# Patient Record
Sex: MALE | Race: WHITE | Employment: FULL TIME | URBAN - METROPOLITAN AREA
[De-identification: names, ages, dates, MRNs, and addresses within clinical notes are randomized per-mention and may not be internally consistent; named-entity substitution may affect disease eponyms.]

---

## 2022-05-31 ENCOUNTER — HOSPITAL ENCOUNTER (EMERGENCY)
Age: 47
Discharge: HOME | End: 2022-05-31
Attending: EMERGENCY MEDICINE
Payer: COMMERCIAL

## 2022-05-31 ENCOUNTER — APPOINTMENT (OUTPATIENT)
Dept: CT IMAGING | Age: 47
End: 2022-05-31
Payer: COMMERCIAL

## 2022-05-31 VITALS
HEART RATE: 95 BPM | RESPIRATION RATE: 18 BRPM | TEMPERATURE: 99.1 F | HEIGHT: 69 IN | DIASTOLIC BLOOD PRESSURE: 85 MMHG | WEIGHT: 185 LBS | BODY MASS INDEX: 27.4 KG/M2 | OXYGEN SATURATION: 100 % | SYSTOLIC BLOOD PRESSURE: 122 MMHG

## 2022-05-31 DIAGNOSIS — K57.32 DIVERTICULITIS OF COLON: Primary | ICD-10-CM

## 2022-05-31 LAB
ALBUMIN SERPL-MCNC: 4.1 G/DL (ref 3.5–5)
ALBUMIN/GLOB SERPL: 1.3 {RATIO}
ALP SERPL-CCNC: 72 U/L (ref 45–117)
ALT SERPL-CCNC: 20 U/L (ref 13–61)
ANION GAP SERPL CALC-SCNC: 13 MMOL/L (ref 7–16)
APPEARANCE UR: CLEAR
AST SERPL-CCNC: 21 U/L (ref 15–37)
BASOPHILS # BLD: 0 K/UL (ref 0–0.2)
BASOPHILS NFR BLD: 0 % (ref 0–2)
BILIRUB SERPL-MCNC: 0.4 MG/DL (ref 0.2–1.1)
BILIRUB UR QL: NEGATIVE
BUN SERPL-MCNC: 10 MG/DL (ref 7–18)
CALCIUM SERPL-MCNC: 9.6 MG/DL (ref 8.3–10.4)
CHLORIDE SERPL-SCNC: 99 MMOL/L (ref 98–107)
CO2 SERPL-SCNC: 27 MMOL/L (ref 21–32)
COLOR UR: YELLOW
CREAT SERPL-MCNC: 0.91 MG/DL (ref 0.8–1.5)
DIFFERENTIAL METHOD BLD: ABNORMAL
EOSINOPHIL # BLD: 0.2 K/UL (ref 0–0.8)
EOSINOPHIL NFR BLD: 3 % (ref 0.5–7.8)
ERYTHROCYTE [DISTWIDTH] IN BLOOD BY AUTOMATED COUNT: 12.2 % (ref 11.9–14.6)
GLOBULIN SER CALC-MCNC: 3.1 G/DL (ref 2.3–3.5)
GLUCOSE SERPL-MCNC: 133 MG/DL (ref 65–100)
GLUCOSE UR STRIP.AUTO-MCNC: NEGATIVE MG/DL
HCT VFR BLD AUTO: 46.2 % (ref 41.1–50.3)
HGB BLD-MCNC: 16.4 G/DL (ref 13.6–17.2)
HGB UR QL STRIP: NEGATIVE
IMM GRANULOCYTES # BLD AUTO: 0 K/UL (ref 0–0.5)
IMM GRANULOCYTES NFR BLD AUTO: 0 % (ref 0–5)
KETONES UR QL STRIP.AUTO: NEGATIVE MG/DL
LEUKOCYTE ESTERASE UR QL STRIP.AUTO: NEGATIVE
LIPASE SERPL-CCNC: 230 U/L (ref 13–60)
LYMPHOCYTES # BLD: 1.5 K/UL (ref 0.5–4.6)
LYMPHOCYTES NFR BLD: 23 % (ref 13–44)
MCH RBC QN AUTO: 32.4 PG (ref 26.1–32.9)
MCHC RBC AUTO-ENTMCNC: 35.5 G/DL (ref 31.4–35)
MCV RBC AUTO: 91.3 FL (ref 79.6–97.8)
MONOCYTES # BLD: 0.6 K/UL (ref 0.1–1.3)
MONOCYTES NFR BLD: 9 % (ref 4–12)
NEUTS SEG # BLD: 4.3 K/UL (ref 1.7–8.2)
NEUTS SEG NFR BLD: 65 % (ref 43–78)
NITRITE UR QL STRIP.AUTO: NEGATIVE
NRBC # BLD: 0 K/UL (ref 0–0.2)
PH UR STRIP: 6 [PH] (ref 5–9)
PLATELET # BLD AUTO: 307 K/UL (ref 150–450)
PMV BLD AUTO: 9.1 FL (ref 9.4–12.3)
POTASSIUM SERPL-SCNC: 4.4 MMOL/L (ref 3.5–5.1)
PROT SERPL-MCNC: 7.2 G/DL (ref 6.4–8.2)
PROT UR STRIP-MCNC: NEGATIVE MG/DL
RBC # BLD AUTO: 5.06 M/UL (ref 4.23–5.6)
SODIUM SERPL-SCNC: 139 MMOL/L (ref 136–145)
SP GR UR REFRACTOMETRY: 1.02 (ref 1–1.02)
UROBILINOGEN UR QL STRIP.AUTO: 0.2 EU/DL (ref 0.2–1)
WBC # BLD AUTO: 6.7 K/UL (ref 4.3–11.1)

## 2022-05-31 PROCEDURE — 6360000004 HC RX CONTRAST MEDICATION: Performed by: PHYSICIAN ASSISTANT

## 2022-05-31 PROCEDURE — 99285 EMERGENCY DEPT VISIT HI MDM: CPT

## 2022-05-31 PROCEDURE — 74177 CT ABD & PELVIS W/CONTRAST: CPT

## 2022-05-31 PROCEDURE — 85025 COMPLETE CBC W/AUTO DIFF WBC: CPT

## 2022-05-31 PROCEDURE — 81003 URINALYSIS AUTO W/O SCOPE: CPT

## 2022-05-31 PROCEDURE — 80053 COMPREHEN METABOLIC PANEL: CPT

## 2022-05-31 PROCEDURE — 83690 ASSAY OF LIPASE: CPT

## 2022-05-31 PROCEDURE — 2580000003 HC RX 258: Performed by: PHYSICIAN ASSISTANT

## 2022-05-31 RX ORDER — SODIUM CHLORIDE 0.9 % (FLUSH) 0.9 %
5-40 SYRINGE (ML) INJECTION 2 TIMES DAILY
Status: DISCONTINUED | OUTPATIENT
Start: 2022-05-31 | End: 2022-05-31 | Stop reason: HOSPADM

## 2022-05-31 RX ORDER — BUPROPION HYDROCHLORIDE 300 MG/1
300 TABLET ORAL EVERY MORNING
COMMUNITY

## 2022-05-31 RX ORDER — 0.9 % SODIUM CHLORIDE 0.9 %
100 INTRAVENOUS SOLUTION INTRAVENOUS ONCE
Status: DISCONTINUED | OUTPATIENT
Start: 2022-05-31 | End: 2022-05-31 | Stop reason: HOSPADM

## 2022-05-31 RX ORDER — OLMESARTAN MEDOXOMIL 5 MG/1
5 TABLET ORAL DAILY
Status: ON HOLD | COMMUNITY
End: 2022-09-19

## 2022-05-31 RX ORDER — SODIUM CHLORIDE 0.9 % (FLUSH) 0.9 %
3 SYRINGE (ML) INJECTION EVERY 8 HOURS
Status: DISCONTINUED | OUTPATIENT
Start: 2022-05-31 | End: 2022-05-31 | Stop reason: HOSPADM

## 2022-05-31 RX ORDER — METRONIDAZOLE 500 MG/1
500 TABLET ORAL 3 TIMES DAILY
Qty: 21 TABLET | Refills: 0 | Status: SHIPPED | OUTPATIENT
Start: 2022-05-31 | End: 2022-06-07

## 2022-05-31 RX ORDER — CIPROFLOXACIN 500 MG/1
500 TABLET, FILM COATED ORAL 2 TIMES DAILY
Qty: 14 TABLET | Refills: 0 | Status: SHIPPED | OUTPATIENT
Start: 2022-05-31 | End: 2022-06-07

## 2022-05-31 RX ORDER — ONDANSETRON 4 MG/1
4 TABLET, FILM COATED ORAL EVERY 8 HOURS PRN
Qty: 10 TABLET | Refills: 0 | Status: SHIPPED | OUTPATIENT
Start: 2022-05-31 | End: 2022-06-03

## 2022-05-31 RX ORDER — 0.9 % SODIUM CHLORIDE 0.9 %
1000 INTRAVENOUS SOLUTION INTRAVENOUS
Status: COMPLETED | OUTPATIENT
Start: 2022-05-31 | End: 2022-05-31

## 2022-05-31 RX ADMIN — SODIUM CHLORIDE 1000 ML: 9 INJECTION, SOLUTION INTRAVENOUS at 13:04

## 2022-05-31 RX ADMIN — IOPAMIDOL 100 ML: 755 INJECTION, SOLUTION INTRAVENOUS at 13:51

## 2022-05-31 ASSESSMENT — ENCOUNTER SYMPTOMS
RHINORRHEA: 0
ABDOMINAL DISTENTION: 0
CONSTIPATION: 0
BACK PAIN: 1
HEMATEMESIS: 0
VOMITING: 1
SHORTNESS OF BREATH: 0
ABDOMINAL PAIN: 1
DIARRHEA: 1
SORE THROAT: 0
NAUSEA: 1
BLOOD IN STOOL: 0

## 2022-05-31 ASSESSMENT — PAIN SCALES - GENERAL: PAINLEVEL_OUTOF10: 7

## 2022-05-31 ASSESSMENT — PAIN - FUNCTIONAL ASSESSMENT: PAIN_FUNCTIONAL_ASSESSMENT: 0-10

## 2022-05-31 ASSESSMENT — PAIN DESCRIPTION - ORIENTATION: ORIENTATION: LEFT

## 2022-05-31 ASSESSMENT — PAIN DESCRIPTION - LOCATION: LOCATION: ABDOMEN

## 2022-05-31 ASSESSMENT — PAIN DESCRIPTION - PAIN TYPE: TYPE: ACUTE PAIN

## 2022-05-31 ASSESSMENT — PAIN DESCRIPTION - DESCRIPTORS: DESCRIPTORS: BURNING

## 2022-05-31 NOTE — ED TRIAGE NOTES
Pt ambulatory from lobby to triage. Pt presents to the ED with c/o left sided abd pain, nausea, vomiting and diarrhea. Pt states he has had the diarrhea for about two weeks, but the abd pain started about three days ago. Denies any fever.

## 2022-05-31 NOTE — ED NOTES
I have reviewed discharge instructions with the patient. The patient verbalized understanding. Patient left ED via Discharge Method: ambulatory to Home with self. Opportunity for questions and clarification provided. Patient given 3 scripts. To continue your aftercare when you leave the hospital, you may receive an automated call from our care team to check in on how you are doing. This is a free service and part of our promise to provide the best care and service to meet your aftercare needs.  If you have questions, or wish to unsubscribe from this service please call [phone]. Thank you for Choosing our [facility] Emergency Department.           Tommye Homans, RN  05/31/22 5860

## 2022-05-31 NOTE — ED PROVIDER NOTES
[facility] Department Provider Note                     PCP:                None Provider               Age: 48 y.o. Sex: male           ICD-10-CM    1. Diverticulitis of colon  K57.32        DISCHARGED     MDM  Number of Diagnoses or Management Options  Diverticulitis of colon  Diagnosis management comments: Mr. Dwain Givens is a 20-year-old male who presented to the facility today with left-sided abdominal pain for the past 3 days. On exam patient is well-appearing in no acute distress. Afebrile with normal vital signs. Labs grossly unremarkable. CT scan of the abdomen obtained due to the left lower quadrant tenderness to palpation on exam.  Findings demonstrate uncomplicated sigmoid diverticulitis. Plan will be to discharge the patient with antibiotics and Zofran for nausea as needed. Continue eating and drinking as tolerated, but focus on fluids. Monitor for new or worsening symptoms such as fevers, worsening pain, uncontrolled nausea vomiting, bloody stools, or other such symptoms and return if they occur. Patient is understanding of the findings here today as well as the plan of care that was reviewed with him and is agreeable to proceed. Patient ambulatory upon discharge in stable condition. Voice dictation software was used during the making of this note. This software is not perfect and grammatical and other typographical errors may be present. This note has been proofread, but may still contain errors.   Cullen Ruiz PA-C; 5/31/2022 @2:43 PM   ===================================================================         Amount and/or Complexity of Data Reviewed  Clinical lab tests: reviewed and ordered  Tests in the radiology section of CPT®: ordered and reviewed  Tests in the medicine section of CPT®: reviewed and ordered  Review and summarize past medical records: yes  Independent visualization of images, tracings, or specimens: yes    Risk of Complications, Morbidity, and/or Mortality  Presenting problems: moderate  Diagnostic procedures: moderate  Management options: moderate  General comments: CT ABDOMEN PELVIS W IV CONTRAST Additional Contrast? None   Final Result    Uncomplicated diverticulitis of the sigmoid colon. The patient was observed in the ED.     Results Reviewed:      Recent Results (from the past 24 hour(s))  -CBC with Diff:   Collection Time: 05/31/22 12:39 PM       Result                      Value             Ref Range           WBC                         6.7               4.3 - 11.1 K*       RBC                         5.06              4.23 - 5.60 *       Hemoglobin                  16.4              13.6 - 17.2 *       Hematocrit                  46.2              41.1 - 50.3 %       MCV                         91.3              79.6 - 97.8 *       MCH                         32.4              26.1 - 32.9 *       MCHC                        35.5 (H)          31.4 - 35.0 *       RDW                         12.2              11.9 - 14.6 %       Platelets                   307               150 - 450 K/*       MPV                         9.1 (L)           9.4 - 12.3 FL       nRBC                        0.00              0.0 - 0.2 K/*       Differential Type           AUTOMATED                             Seg Neutrophils             65                43 - 78 %           Lymphocytes                 23                13 - 44 %           Monocytes                   9                 4.0 - 12.0 %        Eosinophils %               3                 0.5 - 7.8 %         Basophils                   0                 0.0 - 2.0 %         Immature Granulocytes       0                 0.0 - 5.0 %         Segs Absolute               4.3               1.7 - 8.2 K/*       Absolute Lymph #            1.5               0.5 - 4.6 K/*       Absolute Mono #             0.6               0.1 - 1.3 K/*       Absolute Eos #              0.2               0.0 - 0.8 K/*       Basophils Absolute          0.0               0.0 - 0.2 K/*       Absolute Immature Gran*     0.0               0.0 - 0.5 K/*  -CMP:   Collection Time: 05/31/22 12:39 PM       Result                      Value             Ref Range           Sodium                      139               136 - 145 mm*       Potassium                   4.4               3.5 - 5.1 mm*       Chloride                    99                98 - 107 mmo*       CO2                         27                21 - 32 mmol*       Anion Gap                   13                7.0 - 16.0 m*       Glucose                     133 (H)           65 - 100 mg/*       BUN                         10                7.0 - 18.0 M*       CREATININE                  0.91              0.8 - 1.5 MG*       GFR         >114              >60 ml/min/1*       GFR Non- Americ*     >60               >60 ml/min/1*       Calcium                     9.6               8.3 - 10.4 M*       Total Bilirubin             0.4               0.2 - 1.1 MG*       ALT                         20                13.0 - 61.0 *       AST                         21                15 - 37 U/L         Alk Phosphatase             72                45.0 - 117.0*       Total Protein               7.2               6.4 - 8.2 g/*       Albumin                     4.1               3.5 - 5.0 g/*       Globulin                    3.1               2.3 - 3.5 g/*       Albumin/Globulin Ratio      1.3                              -Lipase:   Collection Time: 05/31/22 12:39 PM       Result                      Value             Ref Range           Lipase                      230 (H)           13 - 60 U/L    -Urinalysis w rflx microscopic:   Collection Time: 05/31/22 12:40 PM       Result                      Value             Ref Range           Color, UA                   YELLOW                                Appearance                  CLEAR                                 Specific [place], UA        1.020             1.001 - 1.02*       pH, Urine                   6.0               5.0 - 9.0           Protein, UA                 Negative          NEG mg/dL           Glucose, UA                 Negative          mg/dL               Ketones, Urine              Negative          NEG mg/dL           Bilirubin Urine             Negative          NEG                 Blood, Urine                Negative          NEG                 Urobilinogen, Urine         0.2               0.2 - 1.0 EU*       Nitrite, Urine              Negative          NEG                 Leukocyte Esterase, Ur*     Negative          NEG              I discussed the results of all labs, procedures, radiographs, and treatments with the patient and available family. Treatment plan is agreed upon and the patient is ready for discharge. All voiced understanding of the discharge plan and medication instructions or changes as appropriate. Questions about treatment in the ED were answered. All were encouraged to return should symptoms worsen or new problems develop. h    Patient Progress  Patient progress: stable      Orders Placed This Encounter   Procedures    CT ABDOMEN PELVIS W IV CONTRAST Additional Contrast? None    CBC with Diff    CMP    Lipase    Urinalysis w rflx microscopic    Diet NPO    Saline lock IV        Gayle Spicer is a 48 y.o. male who presents to the Emergency Department with chief complaint of    Chief Complaint   Patient presents with    Abdominal Pain      Patient is a 64-year-old male who presents to facility today with complaint of left lower quadrant abdominal pain, nausea, vomiting, and diarrhea. Patient reports he has been dealing with diarrhea and nausea for the past 2 weeks and states the pain is new the last 3 days. He reports the pain radiates from his left lower quadrant up the left side of his abdomen and does wrap around to the left side of his middle to low back.   He reports he does have a medical history of hypertension and prediabetes for which he takes a single antihypertensive medication as well as metformin. No recent changes in dosing. He states he has had a colonoscopy about 20 years ago but has not had one recently. He denies any fevers, chills, chest pain, shortness of breath, flank pain, dysuria, urgency, frequency, scrotal swelling, penile discharge, or any other symptoms. The history is provided by the patient. Abdominal Pain  Pain location:  LLQ  Pain quality: pressure and sharp    Pain radiates to:  Back  Pain severity:  Moderate  Onset quality:  Gradual  Duration:  3 days  Timing:  Constant  Progression:  Worsening  Chronicity:  New  Relieved by:  Nothing  Worsened by:  Nothing  Ineffective treatments:  None tried  Associated symptoms: diarrhea, nausea and vomiting    Associated symptoms: no anorexia, no chest pain, no chills, no constipation, no dysuria, no fever, no hematemesis, no hematuria, no melena, no shortness of breath and no sore throat    Diarrhea:     Quality:  Watery    Severity:  Moderate    Duration:  2 weeks    Timing:  Constant    Progression:  Unchanged  Nausea:     Severity:  Mild    Onset quality:  Gradual    Duration:  3 days    Timing:  Intermittent    Progression:  Unchanged      Review of Systems   Constitutional: Negative for chills and fever. HENT: Negative for congestion, rhinorrhea and sore throat. Respiratory: Negative for shortness of breath. Cardiovascular: Negative for chest pain. Gastrointestinal: Positive for abdominal pain, diarrhea, nausea and vomiting. Negative for abdominal distention, anorexia, blood in stool, constipation, hematemesis and melena. Genitourinary: Negative for dysuria, flank pain, hematuria and urgency. Musculoskeletal: Positive for back pain. Negative for gait problem. Skin: Negative for rash. Neurological: Negative for dizziness, syncope and headaches.    Psychiatric/Behavioral: Negative for is no hernia in the umbilical area or ventral area. Skin:     General: Skin is warm and dry. Capillary Refill: Capillary refill takes less than 2 seconds. Findings: No rash. Neurological:      General: No focal deficit present. Mental Status: He is alert and oriented to person, place, and time. Psychiatric:         Mood and Affect: Mood normal.         Behavior: Behavior normal.              Procedures    [unfilled]     CT ABDOMEN PELVIS W IV CONTRAST Additional Contrast? None   Final Result   Uncomplicated diverticulitis of the sigmoid colon. Voice dictation software was used during the making of this note. This software is not perfect and grammatical and other typographical errors may be present. This note has not been completely proofread for errors.        Taryn Pink PA-C  05/31/22 0553

## 2022-09-19 ENCOUNTER — APPOINTMENT (OUTPATIENT)
Dept: GENERAL RADIOLOGY | Age: 47
DRG: 062 | End: 2022-09-19
Payer: COMMERCIAL

## 2022-09-19 ENCOUNTER — APPOINTMENT (OUTPATIENT)
Dept: CT IMAGING | Age: 47
DRG: 062 | End: 2022-09-19
Payer: COMMERCIAL

## 2022-09-19 ENCOUNTER — HOSPITAL ENCOUNTER (EMERGENCY)
Age: 47
Discharge: ANOTHER HOSPITAL | DRG: 062 | End: 2022-09-19
Attending: EMERGENCY MEDICINE
Payer: COMMERCIAL

## 2022-09-19 ENCOUNTER — HOSPITAL ENCOUNTER (INPATIENT)
Age: 47
LOS: 3 days | Discharge: HOME | DRG: 062 | End: 2022-09-22
Attending: INTERNAL MEDICINE | Admitting: INTERNAL MEDICINE
Payer: COMMERCIAL

## 2022-09-19 VITALS
RESPIRATION RATE: 13 BRPM | DIASTOLIC BLOOD PRESSURE: 78 MMHG | WEIGHT: 182 LBS | TEMPERATURE: 98.1 F | HEIGHT: 70 IN | BODY MASS INDEX: 26.05 KG/M2 | OXYGEN SATURATION: 95 % | SYSTOLIC BLOOD PRESSURE: 101 MMHG | HEART RATE: 75 BPM

## 2022-09-19 DIAGNOSIS — I63.9 CEREBROVASCULAR ACCIDENT (CVA), UNSPECIFIED MECHANISM (HCC): Primary | ICD-10-CM

## 2022-09-19 DIAGNOSIS — R29.90 STROKE-LIKE SYMPTOMS: ICD-10-CM

## 2022-09-19 DIAGNOSIS — R55 SYNCOPE, UNSPECIFIED SYNCOPE TYPE: ICD-10-CM

## 2022-09-19 DIAGNOSIS — F10.930 ALCOHOL WITHDRAWAL SYNDROME WITHOUT COMPLICATION (HCC): ICD-10-CM

## 2022-09-19 LAB
ALBUMIN SERPL-MCNC: 4.3 G/DL (ref 3.5–5)
ALBUMIN/GLOB SERPL: 1.7
ALP SERPL-CCNC: 73 U/L (ref 45–117)
ALT SERPL-CCNC: 28 U/L (ref 13–61)
AMPHET UR QL SCN: POSITIVE
ANION GAP SERPL CALC-SCNC: 12 MMOL/L (ref 7–16)
AST SERPL-CCNC: 27 U/L (ref 15–37)
BENZODIAZ UR QL: NEGATIVE
BILIRUB SERPL-MCNC: 0.5 MG/DL (ref 0.2–1.1)
BUN SERPL-MCNC: 19 MG/DL (ref 7–18)
CALCIUM SERPL-MCNC: 9.8 MG/DL (ref 8.3–10.4)
CANNABINOIDS UR QL SCN: NEGATIVE
CHLORIDE SERPL-SCNC: 101 MMOL/L (ref 98–107)
CHOLEST SERPL-MCNC: 172 MG/DL
CO2 SERPL-SCNC: 28 MMOL/L (ref 21–32)
COCAINE UR QL SCN: NEGATIVE
CREAT SERPL-MCNC: 1.15 MG/DL (ref 0.8–1.5)
ERYTHROCYTE [DISTWIDTH] IN BLOOD BY AUTOMATED COUNT: 12 % (ref 11.9–14.6)
EST. AVERAGE GLUCOSE BLD GHB EST-MCNC: 117 MG/DL
GLOBULIN SER CALC-MCNC: 2.6 G/DL (ref 2.3–3.5)
GLUCOSE BLD STRIP.AUTO-MCNC: 120 MG/DL (ref 65–100)
GLUCOSE BLD STRIP.AUTO-MCNC: 139 MG/DL (ref 65–100)
GLUCOSE BLD-MCNC: 120 MG/DL
GLUCOSE SERPL-MCNC: 144 MG/DL (ref 65–100)
HBA1C MFR BLD: 5.7 % (ref 4.8–5.6)
HCT VFR BLD AUTO: 44.4 % (ref 41.1–50.3)
HDLC SERPL-MCNC: 36 MG/DL (ref 40–60)
HDLC SERPL: 4.8
HGB BLD-MCNC: 16.1 G/DL (ref 13.6–17.2)
INR PPP: 1
LDLC SERPL CALC-MCNC: 89.6 MG/DL
LIPASE SERPL-CCNC: 56 U/L (ref 13–60)
MAGNESIUM SERPL-MCNC: 1.7 MG/DL (ref 1.2–2.6)
MCH RBC QN AUTO: 33.1 PG (ref 26.1–32.9)
MCHC RBC AUTO-ENTMCNC: 36.3 G/DL (ref 31.4–35)
MCV RBC AUTO: 91.2 FL (ref 79.6–97.8)
NRBC # BLD: 0 K/UL (ref 0–0.2)
OPIATES UR QL: NEGATIVE
PLATELET # BLD AUTO: 275 K/UL (ref 150–450)
PMV BLD AUTO: 9.1 FL (ref 9.4–12.3)
POTASSIUM SERPL-SCNC: 4 MMOL/L (ref 3.5–5.1)
PROT SERPL-MCNC: 6.9 G/DL (ref 6.4–8.2)
PROTHROMBIN TIME: 13.4 SEC (ref 12.6–14.5)
RBC # BLD AUTO: 4.87 M/UL (ref 4.23–5.6)
SERVICE CMNT-IMP: ABNORMAL
SERVICE CMNT-IMP: ABNORMAL
SODIUM SERPL-SCNC: 141 MMOL/L (ref 138–145)
TRIGL SERPL-MCNC: 232 MG/DL (ref 35–150)
TROPONIN I SERPL HS-MCNC: 4.5 PG/ML (ref 0–14)
TROPONIN T SERPL HS-MCNC: 7.2 NG/L (ref 0–22)
TROPONIN T SERPL HS-MCNC: 8.2 NG/L (ref 0–22)
TSH W FREE THYROID IF ABNORMAL: 1.64 UIU/ML (ref 0.36–3.74)
VLDLC SERPL CALC-MCNC: 46.4 MG/DL (ref 6–23)
WBC # BLD AUTO: 6.9 K/UL (ref 4.3–11.1)

## 2022-09-19 PROCEDURE — 80053 COMPREHEN METABOLIC PANEL: CPT

## 2022-09-19 PROCEDURE — 93005 ELECTROCARDIOGRAM TRACING: CPT

## 2022-09-19 PROCEDURE — 84443 ASSAY THYROID STIM HORMONE: CPT

## 2022-09-19 PROCEDURE — 70498 CT ANGIOGRAPHY NECK: CPT

## 2022-09-19 PROCEDURE — 71045 X-RAY EXAM CHEST 1 VIEW: CPT

## 2022-09-19 PROCEDURE — 6370000000 HC RX 637 (ALT 250 FOR IP): Performed by: INTERNAL MEDICINE

## 2022-09-19 PROCEDURE — 3E03317 INTRODUCTION OF OTHER THROMBOLYTIC INTO PERIPHERAL VEIN, PERCUTANEOUS APPROACH: ICD-10-PCS | Performed by: HOSPITALIST

## 2022-09-19 PROCEDURE — 6360000002 HC RX W HCPCS: Performed by: EMERGENCY MEDICINE

## 2022-09-19 PROCEDURE — 85610 PROTHROMBIN TIME: CPT

## 2022-09-19 PROCEDURE — 83735 ASSAY OF MAGNESIUM: CPT

## 2022-09-19 PROCEDURE — 83036 HEMOGLOBIN GLYCOSYLATED A1C: CPT

## 2022-09-19 PROCEDURE — 85027 COMPLETE CBC AUTOMATED: CPT

## 2022-09-19 PROCEDURE — 80061 LIPID PANEL: CPT

## 2022-09-19 PROCEDURE — 2580000003 HC RX 258: Performed by: EMERGENCY MEDICINE

## 2022-09-19 PROCEDURE — 4A03X5D MEASUREMENT OF ARTERIAL FLOW, INTRACRANIAL, EXTERNAL APPROACH: ICD-10-PCS | Performed by: INTERNAL MEDICINE

## 2022-09-19 PROCEDURE — 84484 ASSAY OF TROPONIN QUANT: CPT

## 2022-09-19 PROCEDURE — 80307 DRUG TEST PRSMV CHEM ANLYZR: CPT

## 2022-09-19 PROCEDURE — 6360000004 HC RX CONTRAST MEDICATION: Performed by: EMERGENCY MEDICINE

## 2022-09-19 PROCEDURE — 83690 ASSAY OF LIPASE: CPT

## 2022-09-19 PROCEDURE — 99222 1ST HOSP IP/OBS MODERATE 55: CPT | Performed by: NURSE PRACTITIONER

## 2022-09-19 PROCEDURE — 70450 CT HEAD/BRAIN W/O DYE: CPT

## 2022-09-19 PROCEDURE — 99285 EMERGENCY DEPT VISIT HI MDM: CPT

## 2022-09-19 PROCEDURE — 82962 GLUCOSE BLOOD TEST: CPT

## 2022-09-19 PROCEDURE — 2100000000 HC CCU R&B

## 2022-09-19 PROCEDURE — 96374 THER/PROPH/DIAG INJ IV PUSH: CPT

## 2022-09-19 RX ORDER — 0.9 % SODIUM CHLORIDE 0.9 %
1000 INTRAVENOUS SOLUTION INTRAVENOUS ONCE
Status: COMPLETED | OUTPATIENT
Start: 2022-09-19 | End: 2022-09-19

## 2022-09-19 RX ORDER — ACETAMINOPHEN 650 MG/1
650 SUPPOSITORY RECTAL EVERY 4 HOURS PRN
Status: DISCONTINUED | OUTPATIENT
Start: 2022-09-19 | End: 2022-09-22 | Stop reason: HOSPADM

## 2022-09-19 RX ORDER — LABETALOL HYDROCHLORIDE 5 MG/ML
10 INJECTION, SOLUTION INTRAVENOUS EVERY 10 MIN PRN
Status: DISCONTINUED | OUTPATIENT
Start: 2022-09-19 | End: 2022-09-22 | Stop reason: HOSPADM

## 2022-09-19 RX ORDER — ACETAMINOPHEN 325 MG/1
650 TABLET ORAL EVERY 4 HOURS PRN
Status: DISCONTINUED | OUTPATIENT
Start: 2022-09-19 | End: 2022-09-22 | Stop reason: HOSPADM

## 2022-09-19 RX ORDER — 0.9 % SODIUM CHLORIDE 0.9 %
100 INTRAVENOUS SOLUTION INTRAVENOUS ONCE
Status: DISCONTINUED | OUTPATIENT
Start: 2022-09-19 | End: 2022-09-19 | Stop reason: HOSPADM

## 2022-09-19 RX ORDER — TRAZODONE HYDROCHLORIDE 50 MG/1
100 TABLET ORAL NIGHTLY
COMMUNITY
Start: 2022-09-18

## 2022-09-19 RX ORDER — TRAZODONE HYDROCHLORIDE 50 MG/1
100 TABLET ORAL NIGHTLY
Status: DISCONTINUED | OUTPATIENT
Start: 2022-09-19 | End: 2022-09-22 | Stop reason: HOSPADM

## 2022-09-19 RX ORDER — ONDANSETRON 2 MG/ML
4 INJECTION INTRAMUSCULAR; INTRAVENOUS EVERY 6 HOURS PRN
Status: DISCONTINUED | OUTPATIENT
Start: 2022-09-19 | End: 2022-09-22 | Stop reason: HOSPADM

## 2022-09-19 RX ORDER — LOPERAMIDE HYDROCHLORIDE 2 MG/1
4 CAPSULE ORAL
Status: COMPLETED | OUTPATIENT
Start: 2022-09-19 | End: 2022-09-19

## 2022-09-19 RX ORDER — ONDANSETRON 4 MG/1
4 TABLET, ORALLY DISINTEGRATING ORAL EVERY 8 HOURS PRN
Status: DISCONTINUED | OUTPATIENT
Start: 2022-09-19 | End: 2022-09-22 | Stop reason: HOSPADM

## 2022-09-19 RX ORDER — BUPROPION HYDROCHLORIDE 150 MG/1
150 TABLET, EXTENDED RELEASE ORAL 2 TIMES DAILY
Status: DISCONTINUED | OUTPATIENT
Start: 2022-09-19 | End: 2022-09-22 | Stop reason: HOSPADM

## 2022-09-19 RX ORDER — SODIUM CHLORIDE 0.9 % (FLUSH) 0.9 %
5-40 SYRINGE (ML) INJECTION 2 TIMES DAILY
Status: DISCONTINUED | OUTPATIENT
Start: 2022-09-19 | End: 2022-09-19 | Stop reason: HOSPADM

## 2022-09-19 RX ORDER — SODIUM CHLORIDE 0.9 % (FLUSH) 0.9 %
10 SYRINGE (ML) INJECTION ONCE
Status: COMPLETED | OUTPATIENT
Start: 2022-09-19 | End: 2022-09-19

## 2022-09-19 RX ORDER — BISACODYL 10 MG
10 SUPPOSITORY, RECTAL RECTAL DAILY PRN
Status: DISCONTINUED | OUTPATIENT
Start: 2022-09-19 | End: 2022-09-22 | Stop reason: HOSPADM

## 2022-09-19 RX ORDER — TRAMADOL HYDROCHLORIDE 50 MG/1
50 TABLET ORAL EVERY 6 HOURS PRN
Status: DISCONTINUED | OUTPATIENT
Start: 2022-09-19 | End: 2022-09-22 | Stop reason: HOSPADM

## 2022-09-19 RX ORDER — LOPERAMIDE HYDROCHLORIDE 2 MG/1
2 CAPSULE ORAL 4 TIMES DAILY PRN
Status: DISCONTINUED | OUTPATIENT
Start: 2022-09-19 | End: 2022-09-22 | Stop reason: HOSPADM

## 2022-09-19 RX ORDER — ASPIRIN 300 MG/1
300 SUPPOSITORY RECTAL DAILY
Status: DISCONTINUED | OUTPATIENT
Start: 2022-09-20 | End: 2022-09-22 | Stop reason: HOSPADM

## 2022-09-19 RX ORDER — POLYETHYLENE GLYCOL 3350 17 G/17G
17 POWDER, FOR SOLUTION ORAL DAILY PRN
Status: DISCONTINUED | OUTPATIENT
Start: 2022-09-19 | End: 2022-09-22 | Stop reason: HOSPADM

## 2022-09-19 RX ORDER — ASPIRIN 81 MG/1
81 TABLET ORAL DAILY
Status: DISCONTINUED | OUTPATIENT
Start: 2022-09-20 | End: 2022-09-22 | Stop reason: HOSPADM

## 2022-09-19 RX ORDER — NICOTINE 21 MG/24HR
1 PATCH, TRANSDERMAL 24 HOURS TRANSDERMAL DAILY PRN
Status: DISCONTINUED | OUTPATIENT
Start: 2022-09-19 | End: 2022-09-22 | Stop reason: HOSPADM

## 2022-09-19 RX ORDER — OLMESARTAN MEDOXOMIL AND HYDROCHLOROTHIAZIDE 20/12.5 20; 12.5 MG/1; MG/1
1 TABLET ORAL DAILY
COMMUNITY
Start: 2021-06-30 | End: 2022-10-24 | Stop reason: ALTCHOICE

## 2022-09-19 RX ORDER — BUPROPION HYDROCHLORIDE 300 MG/1
300 TABLET ORAL EVERY MORNING
Status: DISCONTINUED | OUTPATIENT
Start: 2022-09-20 | End: 2022-09-19 | Stop reason: CLARIF

## 2022-09-19 RX ORDER — ATORVASTATIN CALCIUM 80 MG/1
80 TABLET, FILM COATED ORAL NIGHTLY
Status: DISCONTINUED | OUTPATIENT
Start: 2022-09-19 | End: 2022-09-22 | Stop reason: HOSPADM

## 2022-09-19 RX ADMIN — LOPERAMIDE HYDROCHLORIDE 4 MG: 2 CAPSULE ORAL at 17:48

## 2022-09-19 RX ADMIN — SODIUM CHLORIDE, PRESERVATIVE FREE 10 ML: 5 INJECTION INTRAVENOUS at 10:59

## 2022-09-19 RX ADMIN — Medication 21 MG: at 10:58

## 2022-09-19 RX ADMIN — ATORVASTATIN CALCIUM 80 MG: 80 TABLET, FILM COATED ORAL at 21:50

## 2022-09-19 RX ADMIN — ACETAMINOPHEN 650 MG: 325 TABLET, FILM COATED ORAL at 15:28

## 2022-09-19 RX ADMIN — IOPAMIDOL 60 ML: 755 INJECTION, SOLUTION INTRAVENOUS at 11:11

## 2022-09-19 RX ADMIN — SODIUM CHLORIDE 1000 ML: 9 INJECTION, SOLUTION INTRAVENOUS at 10:29

## 2022-09-19 RX ADMIN — SODIUM CHLORIDE, PRESERVATIVE FREE 10 ML: 5 INJECTION INTRAVENOUS at 10:57

## 2022-09-19 RX ADMIN — ACETAMINOPHEN 650 MG: 325 TABLET, FILM COATED ORAL at 21:42

## 2022-09-19 ASSESSMENT — ENCOUNTER SYMPTOMS
ABDOMINAL PAIN: 0
VOMITING: 0
SHORTNESS OF BREATH: 0
BACK PAIN: 0
RHINORRHEA: 0
NAUSEA: 0
DIARRHEA: 0
PHOTOPHOBIA: 0
HEMATOCHEZIA: 0
COUGH: 0

## 2022-09-19 ASSESSMENT — PAIN DESCRIPTION - LOCATION
LOCATION: CHEST
LOCATION: HEAD
LOCATION: HEAD
LOCATION: CHEST

## 2022-09-19 ASSESSMENT — PAIN SCALES - GENERAL
PAINLEVEL_OUTOF10: 0
PAINLEVEL_OUTOF10: 0
PAINLEVEL_OUTOF10: 6
PAINLEVEL_OUTOF10: 0
PAINLEVEL_OUTOF10: 3
PAINLEVEL_OUTOF10: 6
PAINLEVEL_OUTOF10: 0
PAINLEVEL_OUTOF10: 9

## 2022-09-19 ASSESSMENT — PAIN DESCRIPTION - DESCRIPTORS
DESCRIPTORS: ACHING
DESCRIPTORS: ACHING;SHARP
DESCRIPTORS: TIGHTNESS;HEAVINESS;PRESSURE
DESCRIPTORS: HEAVINESS;PRESSURE

## 2022-09-19 ASSESSMENT — PAIN DESCRIPTION - PAIN TYPE
TYPE: ACUTE PAIN
TYPE: ACUTE PAIN

## 2022-09-19 ASSESSMENT — PAIN DESCRIPTION - ORIENTATION
ORIENTATION: ANTERIOR
ORIENTATION: POSTERIOR;ANTERIOR
ORIENTATION: LEFT

## 2022-09-19 ASSESSMENT — PAIN DESCRIPTION - FREQUENCY
FREQUENCY: CONTINUOUS
FREQUENCY: CONTINUOUS

## 2022-09-19 ASSESSMENT — PAIN - FUNCTIONAL ASSESSMENT: PAIN_FUNCTIONAL_ASSESSMENT: 0-10

## 2022-09-19 ASSESSMENT — PAIN DESCRIPTION - ONSET: ONSET: GRADUAL

## 2022-09-19 NOTE — ED NOTES
Patient is speaking clearly but states that even though the words sound correct, he is seeing them as jumbled or reversed (as in dyslexia). He is \"reading aloud\" the words correctly but has to focus to make sure he speaks what he knows what are the correct words vs what he is seeing. I had him read the phrases backwards (read the words in reverse order) and he states that he is still seeing the words as jumbled but is speaking them correctly .       Leesa Zhu RN  09/19/22 [id number]

## 2022-09-19 NOTE — ED NOTES
For the dysarthria test patient is able read back the phrases correctly but reports the the words seemed jumbled in his head. New onset.      Junaid Moise RN  09/19/22 1122

## 2022-09-19 NOTE — ED TRIAGE NOTES
Leodan arrives ambulatory to the ED. Reports a syncopal episode on Saturday. Also has been having  since then that radiates to left jaw, neck, arm, with numbness and tingling. C/o SOB.  Denies N/V.

## 2022-09-19 NOTE — FLOWSHEET NOTE
Bedside and verbal shift change report received from  Vicki Roy [place] (offgoing nurse). Report included the following information SBAR, Kardex, Intake/Output, MAR, Recent Results, Med Rec Status, Cardiac Rhythm NSR, Alarm Parameters , and Dual Neuro Assessment.      Dual skin assessment completed at bedside: n/a (list pertinent skin assessment findings)    Dual verification of gtts completed (name of gtts verified): n/a         09/19/22 1900   NIHSS Stroke Scale   Interval Hand-off/Transfer   Level of Consciousness (1a) 0   LOC Questions (1b) 0   LOC Commands (1c) 0   Best Gaze (2) 0   Visual (3) 0   Facial Palsy (4) (!) 1   Motor Arm, Left (5a) 1   Motor Arm, Right (5b) 0   Motor Leg, Left (6a) 1   Motor Leg, Right (6b) 0   Limb Ataxia (7) 0   Sensory (8) (!) 1   Best Language (9) 0   Dysarthria (10) 0   Extinction and Inattention (11) 0   Total 4   NIHSS Stroke Scale Assessed Yes

## 2022-09-19 NOTE — ED PROVIDER NOTES
Emergency Department Provider Note                   PCP:                None Provider               Age: 48 y.o. Sex: male       ICD-10-CM    1. Cerebrovascular accident (CVA), unspecified mechanism ([facility] [address]  I63.9       2. Stroke-like symptoms  R29.90       3. Syncope, unspecified syncope type  R55           DISPOSITION Decision To Transfer 09/19/2022 11:48:00 AM        MDM  Number of Diagnoses or Management Options  Cerebrovascular accident (CVA), unspecified mechanism ([facility] [address]): new, needed workup  Stroke-like symptoms: new, needed workup  Syncope, unspecified syncope type: new, needed workup  Diagnosis management comments: Code S called. There was initial delay in calling Code S as his chief complaint was centered on chest pain. Patient later discussed left-sided weakness. NIHSS 2.  CT head unremarkable. [facility] Neuro states patient is candidate for tenecteplase. Neurologist thoroughly discussed with patient risks and benefits of treatment. I also discussed with patient risks/benefits associated with treatment. Patient in agreement to receiving. Patient was given tenecteplase. Nursing closely monitoring. Blood pressure stable. CTA pending at this time. Will discuss with hospitalist.  Have contacted radiologist and IR as CTA head neck still pending at this time. Have contacted hospitalist service via [employer] to arrange for transport and admission to [facility] facility  Later upon reviewing chart, CTA was read by Dr. Adrian Berry. 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 The posterior cerebral arteries are patent. Discussed with Dr. Adrian Berry who recommends that we discussed with neuro interventionalists at [facility] to see if this is something that they would actually intervene on. Images have been pushed over to [facility] so that they can be viewed. Discussed case with Dr. Madonna Weaver at [facility].   She as well as neuro interventionalists looked over CTA and noted no occlusion or thrombus. States that there is no intervention that they would perform at this time. States the patient can remain at [facility] and does not need to be transferred to [facility]. Updated Dr. Ollie Gilmore on plan. Updated patient on plan. Patient states that there has been some improvement of symptoms. Amount and/or Complexity of Data Reviewed  Clinical lab tests: ordered and reviewed  Tests in the radiology section of CPT®: ordered and reviewed  Tests in the medicine section of CPT®: ordered and reviewed  Review and summarize past medical records: yes  Discuss the patient with other providers: yes  Independent visualization of images, tracings, or specimens: yes    Risk of Complications, Morbidity, and/or Mortality  Presenting problems: high  Diagnostic procedures: high  Management options: high    Critical Care  Total time providing critical care: 30-74 minutes    Patient Progress  Patient progress: improved       ED Course as of 09/19/22 1356   Mon Sep 19, 2022   1031 Portable Chest X-ray FINDINGS:      Support Lines and Tubes: None     Cardiac Silhouette: Within normal limits in size. Lungs: Low lung volumes with bibasilar atelectasis. Pleura: No pleural effusion. No pneumothorax. Osseous Structures: Unremarkable. Upper Abdomen: Unremarkable. IMPRESSION:  Low lung volumes with bibasilar atelectasis. [DF]   1031 CT head FINDINGS:     There is no evidence of acute intracranial hemorrhage or extra-axial  collections. There is no CT evidence of acute infarction. The ventricles are within normal limits. There is no midline shift. The basilar  cisterns are patent. There is no cerebellar tonsillar ectopia or herniation. The imaged portions of the paranasal sinuses and mastoid air cells are well  aerated and clear. IMPRESSION:  No evidence of acute intracranial abnormality.     [DF]   1211 Amphetamine, Urine(!): Positive [DF]   9995 Was informed by nurse that patient with headache after receiving tenecteplase. Will update neurology to see if they would like patient to have a repeat CT head. Normal neuro exam.  Patient actually reports improvement of left-sided symptoms. [DF]   6601 CTA head and neck CT ANGIOGRAM OF THE NECK:     The arch and proximal great vessels are patent. The right and left carotid bulbs  are patent. Mild atherosclerotic changes are noted on the right however no  significant stenosis. Proximal vertebral arteries are patent. The lung apices are clear. Thyroid gland is unremarkable. CT angiogram of [place]:     The petrous, cavernous, and supraclinoid internal carotid arteries are patent. The anterior and middle cerebral arteries are patent. The distal vertebral arteries are patent however the origin of the basilar  artery is occluded however the vessel reconstitutes flow. The posterior cerebral  arteries are patent. The dural venous sinuses are patent. IMPRESSION:  1. 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 The posterior cerebral arteries are patent. [DF]   1243 Was never informed by interventional radiologist on read of CTA head neck. Was never contacted via telephone.  Attempted to  call Dr. Patti Mcclain at 12:43 pm.  [DF]      ED Course User Index  [DF] Mark Larkin MD        Orders Placed This Encounter   Procedures    Critical Care    XR CHEST PORTABLE    CT HEAD WO CONTRAST    CTA HEAD NECK W CONTRAST    CBC    Comprehensive Metabolic Panel    Troponin T    Magnesium    Lipase    Drug Screen Psych, Urine    Protime-INR    Diet NPO    Cardiac Monitor    Pulse Oximetry    Activate Code Stroke    Continuous Pulse Oximetry    Neuro checks    NIH STROKE SCALE ASSESSMENT    Nursing swallow assessment    Weigh patient    Notify physician prior to thrombolytic agent    NIH Stroke Scale (NIHSS) during and post thrombolytic agent    Implement thrombolytic agent hemorrhage/bleeding precautions    Avoid antiplatelet and antithrombotic medications for 24 hours post administration of thrombolytic agent    No Anticoagulants for 24 hours after thrombolytic agent    Implement suspected intracerebral hemorrhage (ICH) guidelines    Notify physician during and post thrombolytic agent    Notify physician during and post thrombolytic agent    POCT Glucose    POCT Glucose    EKG 12 Lead    Saline lock IV    Initiate minimum 2 IV lines for thrombolytic agent infusion    Fall precautions        Medications   0.9 % sodium chloride bolus (has no administration in time range)   sodium chloride flush 0.9 % injection 5-40 mL (has no administration in time range)   0.9 % sodium chloride bolus (0 mLs IntraVENous Stopped 9/19/22 1144)   sodium chloride flush 0.9 % injection 10 mL (10 mLs IntraVENous Given 9/19/22 1057)     Followed by   tenecteplase (TNKASE) injection 21 mg (21 mg IntraVENous Given 9/19/22 1058)     Followed by   sodium chloride flush 0.9 % injection 10 mL (10 mLs IntraVENous Given 9/19/22 1059)   iopamidol (ISOVUE-370) 76 % injection 60 mL (60 mLs IntraVENous Given 9/19/22 1111)       New Prescriptions    No medications on file        Anup Wilson is a 48 y.o. male who presents to the Emergency Department with chief complaint of  chest pain, left sided weakness, syncope. Chief Complaint   Patient presents with    Chest Pain     since Saturday     Loss of Consciousness     On Saturday      48year-old male with history of depression, anxiety, hypertension, diabetes mellitus type 2 presents with complaint of left-sided chest pain and a single episode while attending [employer] this past Saturday. States that he had 2 beers during the game. States he began to feel left-sided chest pain during half time.   States that at the end of the game he had a witnessed syncopal episode by significant other and had to be carried out the same.  Patient was instructed to go to a local hospital but never did. Patient states he was concerned he was dehydrated and drink plenty of fluids on yesterday. Patient states that he woke up this morning feeling completely normal and that around 7:45 AM when he was walking into work he noted left-sided weakness/numbness symptoms. Patient states that he informed triage nurse of left-sided weakness. The history is provided by the patient and the spouse. No  was used. Extremity Weakness  Severity:  Moderate  Onset quality:  Sudden  Duration:  2 hours  Timing:  Constant  Progression:  Unchanged  Chronicity:  New  Relieved by:  Nothing  Worsened by:  Nothing  Ineffective treatments:  None tried  Associated symptoms: chest pain, extremity numbness and stroke symptoms    Associated symptoms: no abdominal pain, no anorexia, no aphasia, no arthralgias, no cough, no diarrhea, no dizziness, no drooling, no dysphagia, no dysuria, no falls, no fever, no frequency, no hematochezia, no lethargy, no loss of consciousness, no melena, no myalgias, no nausea, no seizures, no sensory-motor deficit, no shortness of breath, no urgency and no vomiting        Review of Systems   Constitutional:  Positive for fatigue. Negative for chills, diaphoresis and fever. HENT:  Negative for congestion, drooling and rhinorrhea. Eyes:  Negative for photophobia and visual disturbance. Respiratory:  Negative for cough and shortness of breath. Cardiovascular:  Positive for chest pain. Negative for palpitations and leg swelling. Gastrointestinal:  Negative for abdominal pain, anorexia, diarrhea, dysphagia, hematochezia, melena, nausea and vomiting. Genitourinary:  Negative for dysuria, flank pain, frequency and urgency. Musculoskeletal:  Negative for arthralgias, back pain, falls and myalgias. Skin:  Negative for rash and wound. Neurological:  Positive for syncope, weakness and numbness.  Negative for dizziness, seizures, loss of consciousness, facial asymmetry and speech difficulty. Hematological:  Does not bruise/bleed easily. Psychiatric/Behavioral:  Negative for confusion. Past Medical History:   Diagnosis Date    Borderline diabetes     Depression     Hypertension         History reviewed. No pertinent surgical history. Reports history of colonoscopy around 20 years ago. History reviewed. No pertinent family history. Patient reports father with history of stroke. Social History     Socioeconomic History    Marital status:      Spouse name: None    Number of children: None    Years of education: None    Highest education level: None   Tobacco Use    Smoking status: Never    Smokeless tobacco: Never   Substance and Sexual Activity    Alcohol use: Yes     Alcohol/week: 12.0 standard drinks     Types: 12 Cans of beer per week    Drug use: Never         Penicillins     Previous Medications    BUPROPION (WELLBUTRIN XL) 300 MG EXTENDED RELEASE TABLET    Take 300 mg by mouth every morning    METFORMIN (GLUCOPHAGE) 500 MG TABLET    Take 500 mg by mouth daily (with breakfast)    OLMESARTAN (BENICAR) 5 MG TABLET    Take 5 mg by mouth daily    TRAZODONE (DESYREL) 25 MG TABS    Take 25 mg by mouth nightly        Vitals signs and nursing note reviewed. Patient Vitals for the past 4 hrs:   Temp Pulse Resp BP SpO2   09/19/22 1145 -- 73 12 123/86 --   09/19/22 1130 -- 75 13 118/75 --   09/19/22 1115 -- 75 10 109/79 91 %   09/19/22 1100 -- 79 15 129/76 --   09/19/22 1058 -- -- -- 115/75 --   09/19/22 1045 -- 80 15 124/79 --   09/19/22 1030 -- 74 14 117/77 --   09/19/22 0935 -- -- -- 111/71 --   09/19/22 0934 98.1 °F (36.7 °C) 77 22 -- 97 %          Physical Exam  Vitals and nursing note reviewed. Constitutional:       Appearance: Normal appearance. HENT:      Head: Normocephalic. Comments: Atraumatic.      Right Ear: Tympanic membrane and ear canal normal.      Left Ear: Tympanic membrane and ear canal normal.      Nose: Nose normal.      Mouth/Throat:      Mouth: Mucous membranes are moist.      Pharynx: No oropharyngeal exudate or posterior oropharyngeal erythema. Eyes:      Extraocular Movements: Extraocular movements intact. Pupils: Pupils are equal, round, and reactive to light. Comments: No nystagmus. Cardiovascular:      Rate and Rhythm: Normal rate. Pulses: Normal pulses. Heart sounds: Normal heart sounds. Comments: Pulses 2+ and equal throughout. Pulmonary:      Effort: Pulmonary effort is normal.      Breath sounds: Normal breath sounds. Abdominal:      General: Bowel sounds are normal. There is no distension. Palpations: Abdomen is soft. Tenderness: There is no abdominal tenderness. There is no guarding or rebound. Comments: Soft, nontender, nondistended. No rebound or guarding. Musculoskeletal:         General: No deformity. Normal range of motion. Cervical back: Normal range of motion. No rigidity. Skin:     General: Skin is warm. Findings: No rash. Neurological:      General: No focal deficit present. Mental Status: He is alert and oriented to person, place, and time. Cranial Nerves: No cranial nerve deficit. Sensory: Sensory deficit present. Motor: Weakness present. Comments: Drift noted to left upper extremity. LUE strength 4 out of 5. Subjective decreased sensation to left arm and left leg. No facial droop. No dysarthria. No aphasia.    Psychiatric:         Mood and Affect: Mood normal.         Behavior: Behavior normal.        EKG 12 Lead    Date/Time: 9/19/2022 10:02 AM  Performed by: Hanane Garcia MD  Authorized by: Hanane Garcia MD     ECG reviewed by ED Physician in the absence of a cardiologist: yes    Rate:     ECG rate:  77    ECG rate assessment: normal    Rhythm:     Rhythm: sinus rhythm    Ectopy:     Ectopy: none    QRS:     QRS axis:  Normal    QRS intervals:  Normal    QRS conduction: normal    ST segments:     ST segments:  Normal  T waves:     T waves: normal    Critical Care    Date/Time: 9/19/2022 11:49 AM  Performed by: Mariann Maya MD  Authorized by: Mariann Maya MD     Critical care provider statement:     Critical care time (minutes):  40    Critical care time was exclusive of:  Separately billable procedures and treating other patients    Critical care was necessary to treat or prevent imminent or life-threatening deterioration of the following conditions:  CNS failure or compromise and dehydration    Critical care was time spent personally by me on the following activities:  Blood draw for specimens, development of treatment plan with patient or surrogate, discussions with consultants, discussions with primary provider, examination of patient, evaluation of patient's response to treatment, obtaining history from patient or surrogate, ordering and performing treatments and interventions, ordering and review of laboratory studies, ordering and review of radiographic studies, pulse oximetry and re-evaluation of patient's condition    I assumed direction of critical care for this patient from another provider in my specialty: yes      Care discussed with: admitting provider    Comments:      Patient with strokelike symptoms. Patient candidate for receiving tenecteplase. [facility] neurology recommends administering tenecteplase. Risk/benefits associated with administration discussed thoroughly. Patient alert and oriented x4. Patient states that he would like to receive tenecteplase at this time. Results for orders placed or performed during the hospital encounter of 09/19/22   XR CHEST PORTABLE    Narrative    EXAMINATION: CHEST RADIOGRAPH 9/19/2022 9:40 AM    ACCESSION NUMBER: [id number]    INDICATION: Chest Pain    COMPARISON: None available    TECHNIQUE: A single view of the chest was obtained.      FINDINGS:     Support Lines and Tubes: None    Cardiac Silhouette: Within normal limits in size. Lungs: Low lung volumes with bibasilar atelectasis. Pleura: No pleural effusion. No pneumothorax. Osseous Structures: Unremarkable. Upper Abdomen: Unremarkable. Impression    Low lung volumes with bibasilar atelectasis. CT HEAD WO CONTRAST    Narrative    EXAMINATION: CT HEAD WO CONTRAST 9/19/2022 10:05 AM    ACCESSION NUMBER: [id number]    COMPARISON: None available    INDICATION: Stroke Symptoms left-sided weakness and numbness     TECHNIQUE: Multiple-row detector helical CT examination of the head without  intravenous contrast.     Radiation dose reduction techniques were used for this study. Our CT scanners  use one or all of the following: Automated exposure control, adjustment of the  mA and/or kV according to patient size, iterative reconstruction. FINDINGS:    There is no evidence of acute intracranial hemorrhage or extra-axial  collections. There is no CT evidence of acute infarction. The ventricles are within normal limits. There is no midline shift. The basilar  cisterns are patent. There is no cerebellar tonsillar ectopia or herniation. The imaged portions of the paranasal sinuses and mastoid air cells are well  aerated and clear. Impression    No evidence of acute intracranial abnormality. CTA HEAD NECK W CONTRAST    Narrative    History: Syncopal episode on Saturday chest pain with radiation arm numbness and  tingling    Comments: CT ANGIOGRAM OF THE NECK AND CT ANGIOGRAM OF THE [place] was  obtained following the administration of IV contrast. IV contrast was  administered to evaluate the arterial vasculature. Reformatted images in the  coronal and sagittal planes as well as 3-D imaging was obtained and reviewed on  a dedicated PACS and [address]. Radiation reduction dose techniques  were used for the study.  Our CT scanner use one or all of the following-  Automated exposure control, adjustment of the mA and/or KV according the patient  size, iterative reconstruction. All measurements are based upon NASCET criteria  if appropriate. This study was analyzed by the [address] [place].Algorithm    Contrast: 60 cc of Isovue 370    Findings:    CT ANGIOGRAM OF THE NECK:    The arch and proximal great vessels are patent. The right and left carotid bulbs  are patent. Mild atherosclerotic changes are noted on the right however no  significant stenosis. Proximal vertebral arteries are patent. The lung apices are clear. Thyroid gland is unremarkable. CT angiogram of [place]:    The petrous, cavernous, and supraclinoid internal carotid arteries are patent. The anterior and middle cerebral arteries are patent. The distal vertebral arteries are patent however the origin of the basilar  artery is occluded however the vessel reconstitutes flow. The posterior cerebral  arteries are patent. The dural venous sinuses are patent. Impression    1. 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 The posterior cerebral arteries are patent.        CBC   Result Value Ref Range    WBC 6.9 4.3 - 11.1 K/uL    RBC 4.87 4.23 - 5.60 M/uL    Hemoglobin 16.1 13.6 - 17.2 g/dL    Hematocrit 44.4 41.1 - 50.3 %    MCV 91.2 79.6 - 97.8 FL    MCH 33.1 (H) 26.1 - 32.9 PG    MCHC 36.3 (H) 31.4 - 35.0 g/dL    RDW 12.0 11.9 - 14.6 %    Platelets 193 181 - 209 K/uL    MPV 9.1 (L) 9.4 - 12.3 FL    nRBC 0.00 0.0 - 0.2 K/uL   Comprehensive Metabolic Panel   Result Value Ref Range    Sodium 141 138 - 145 mmol/L    Potassium 4.0 3.5 - 5.1 mmol/L    Chloride 101 98 - 107 mmol/L    CO2 28 21 - 32 mmol/L    Anion Gap 12 7.0 - 16.0 mmol/L    Glucose 144 (H) 65 - 100 mg/dL    BUN 19 (H) 7.0 - 18.0 MG/DL    Creatinine 1.15 0.8 - 1.5 MG/DL    GFR African American >87 >60 ml/min/1.73m2    GFR Non- >60 >60 ml/min/1.73m2 Calcium 9.8 8.3 - 10.4 MG/DL    Total Bilirubin 0.5 0.2 - 1.1 MG/DL    ALT 28 13.0 - 61.0 U/L    AST 27 15 - 37 U/L    Alk Phosphatase 73 45.0 - 117.0 U/L    Total Protein 6.9 6.4 - 8.2 g/dL    Albumin 4.3 3.5 - 5.0 g/dL    Globulin 2.6 2.3 - 3.5 g/dL    Albumin/Globulin Ratio 1.7     Troponin T   Result Value Ref Range    Troponin T 8.2 0 - 22 ng/L   Magnesium   Result Value Ref Range    Magnesium 1.7 1.2 - 2.6 mg/dL   Lipase   Result Value Ref Range    Lipase 56 13 - 60 U/L   Drug Screen Psych, Urine   Result Value Ref Range    Benzodiazepines, Urine Negative NEG      Opiates, Urine Negative NEG      Amphetamine, Urine Positive (A) NEG      Cocaine, Urine Negative NEG      THC, TH-Cannabinol, Urine Negative NEG     Protime-INR   Result Value Ref Range    Protime 13.4 12.6 - 14.5 sec    INR 1.0     POCT Glucose   Result Value Ref Range    Glucose 120 mg/dL   POCT Glucose   Result Value Ref Range    POC Glucose 120 (H) 65 - 100 mg/dL    Performed by: Robert         CTA HEAD NECK W CONTRAST   Final Result   1. 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 The posterior cerebral arteries are patent. CT HEAD WO CONTRAST   Final Result      No evidence of acute intracranial abnormality. XR CHEST PORTABLE   Final Result      Low lung volumes with bibasilar atelectasis. NIH Stroke Scale  Interval: Reassessment  Level of Consciousness (1a): Alert  LOC Questions (1b): Answers both correctly  LOC Commands (1c): Performs both tasks correctly  Best Gaze (2): Normal  Visual (3): No visual loss  Facial Palsy (4): (!) Minor paralysis  Motor Arm, Left (5a): Drift, but does not hit bed  Motor Arm, Right (5b): No drift  Motor Leg, Left (6a): Drift, but does not hit bed  Motor Leg, Right (6b):  No drift  Limb Ataxia (7): Absent  Sensory (8): (!) Mild to Moderate  Best Language (9): No aphasia  Dysarthria (10): Normal  Extinction and Inattention (11): No abnormality  Total: 4              Voice dictation software was used during the making of this note. This software is not perfect and grammatical and other typographical errors may be present. This note has not been completely proofread for errors.      Danielle Decker., MD  09/19/22 1220       Mark Shaikh., MD  09/19/22 1250       Mark Shaikh., MD  09/19/22 3382

## 2022-09-19 NOTE — H&P
Hospitalist History and Physical   Admit Date:  2022  2:24 PM   Name:  Nelda Fontana   Age:  48 y.o. Sex:  male  :  1975   MRN:  [id number]   Room:  [address] [place] [address]    Presenting Complaint: No chief complaint on file. Reason(s) for Admission: Acute cerebrovascular accident (CVA) due to ischemia [facility]) [I63.9]     History of Present Illness:   Nelda Fontana is a 48 y.o. male with medical history of prediabetes, hypertension, depression who presented with chest pain and left-sided weakness. Patient reports having his first episode of left-sided, pressure-like chest pain on Saturday while attending [facility] versus [employer] football game. Reports getting very hot and subsequently passed out for 15 minutes. He experienced another episode of chest pain while at work. Reports shortness of breath with walking short distance this morning as well. Reports left-sided numbness, tingling. Symptoms started around 7:45AM.   Saw his in house PCP at work who sent him to ED. CODE S was called in the ED with NIHSS of 2. Telenuro recommended tPA and he received it at 1058. 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  discussed Dr. Faye Neil at [facility]. She as well as neuro interventionalists looked over CTA and noted no occlusion or thrombus. States that there is no intervention that they would perform at this time. States the patient can remain at [facility] and does not need to be transferred to [facility]. Laboratory work-up is unremarkable. Urine toxicology is positive for amphetamines. Chest x-ray with low lung volumes with bibasilar atelectasis. EKG with normal sinus rhythm without ST or T wave abnormalities. Review of Systems:  10 systems reviewed and negative except as noted in HPI. Assessment & Plan:   Acute Ischemic CVA s/p tPA  Presented with left sided weakness with NIHSS of 2 on admission.    Teleneuro evaluated and recommended tPA @ 1058. CT Head without acute abnormalities. 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  discussed Dr. Kishor Elena at [facility]. She as well as neuro interventionalists looked over CTA and noted no occlusion or thrombus. States that there is no intervention that they would perform at this time. States the patient can remain at [facility] and does not need to be transferred to [facility]. - Monitor for bleeding: headache/dizziness, hematuria, bloody stools, bruising, bleeding gums, epistaxis, bleeding from his knee. - BP checks q15 minutes x 2 hours, then q30 minutes x 6 hours, then q1h x 16 hours  - permissive HTN w/ goal BP < 180/105 [ if SBP > 180-230 or -120, give labetalol 10 mg IV bolus or nicardipine 5 mg/h gtt, titrate up to max of 15 mg/h. If still refractory, consider IV nitroprusside ]  - Glycemic control blood glucose <180  - NO anticoagulants or antiplatelets for 24 hours  - Avoid new insertion of central lines, A-lines, NG tubes,unless medically necessary  - Repeat CT brain 24 hours after first CT brain  - Repeat CT head w/o contrast if any neurological deterioration  - f/u MRI brain  - f/u ECHO w/ bubble study  - f/u TSH, A1c, lipid panel  - ASA 81mg tomorrow if CT brain tomorrow is negative  - Atorvastatin 80 mg PO daily  - NPO, speech and swallow evaluation  - Admission to ICU    Syncope and collapse   Had 1 episodes of syncope at [facility] vs [facility] this sat where he passed out for 15mins  - check EEG  - working up for CVA as above    Chest pain  EKG normal with no troponin. Utox positive for amphetamines. Had pressure like chest pain that grows across his chest with SOB today with minimal exertion. - consider Cardiology consult if continues to have cp.  - check troponin. Pre-diabetes : on metformin at home    Depression: continue wellbutrin when able to take PO    Diet: ADULT DIET;  Regular; 4 carb choices (60 gm/meal)  VTE ppx: SCDs  Code status: Full Code    Critical care time: 35 minutes    Patient is critically ill. Without intervention, there is a high probability of acute organ impairment or life-threatening deterioration in the patient's condition from: CVA    Critical care interventions: tPA, need close monitoring in ICU as pt is at risk for major bleeding event which can be life threatening. More than 50% of the time documented was spent in face-to-face contact with the patient and in the care of the patient on the floor/unit where the patient is located. Due to a high probability of clinically significant, life-threatening deterioration that could result in multiorgan failure,  this critically ill or injured patient required my highest level preparedness to intervene emergent. This critical care time included time spent at bedside performing patient interview and physical exam, time spent researching patient prior to interaction with patient, time spent discussing findings and treatment plan with patient and/or family, time spent discussing patient with consultants and colleagues, time spent reviewing pertinent laboratory and radiographic evaluations, time spent re-evaluating patient, or time spent discussing patient with nursing staff. The time listed is exclusive of any procedures which may have been performed. Hospital Problems:  Principal Problem:    Acute cerebrovascular accident (CVA) due to ischemia [facility])  Active Problems:    Hypertension    Borderline diabetes    Depression  Resolved Problems:    * No resolved hospital problems. *       Past History:     Past Medical History:   Diagnosis Date    Borderline diabetes     Depression     Hypertension        No past surgical history on file. Social History     Tobacco Use    Smoking status: Never    Smokeless tobacco: Never   Substance Use Topics    Alcohol use:  Yes     Alcohol/week: 12.0 standard drinks     Types: 12 Cans of beer per week Social History     Substance and Sexual Activity   Drug Use Never       Family History   Problem Relation Age of Onset    Diabetes Mother     Seizures Father           There is no immunization history on file for this patient. Allergies   Allergen Reactions    Penicillins Hives     Prior to Admit Medications:  Current Outpatient Medications   Medication Instructions    buPROPion (WELLBUTRIN XL) 300 mg, Oral, EVERY MORNING    metFORMIN (GLUCOPHAGE) 500 mg, Oral, DAILY WITH BREAKFAST    olmesartan-hydroCHLOROthiazide (BENICAR HCT) 20-12.5 MG per tablet 1 tablet, Oral, DAILY    traZODone (DESYREL) 100 mg, Oral, Nightly         Objective:   Patient Vitals for the past 24 hrs:    Temp Pulse Resp BP SpO2   09/19/22 1345 -- 75 13 101/78 95 %   09/19/22 1330 -- 76 16 107/86 99 %   09/19/22 1315 -- 74 15 106/78 --   09/19/22 1300 -- 77 14 113/79 97 %   09/19/22 1245 -- 74 14 108/81 100 %   09/19/22 1230 -- 78 14 108/72 94 %   09/19/22 1215 -- 82 11 116/83 --   09/19/22 1200 -- 73 15 105/67 --   09/19/22 1145 -- 73 12 123/86 --   09/19/22 1130 -- 75 13 118/75 --   09/19/22 1115 -- 75 10 109/79 91 %   09/19/22 1100 -- 79 15 129/76 --   09/19/22 1058 -- -- -- 115/75 --   09/19/22 1045 -- 80 15 124/79 --   09/19/22 1030 -- 74 14 117/77 --   09/19/22 0935 -- -- -- 111/71 --   09/19/22 0934 98.1 °F (36.7 °C) 77 22 -- 97 %         Oxygen Therapy  SpO2: 95 %  Pulse via Oximetry: 77 beats per minute  O2 Device: None (Room air)     Estimated body mass index is 26.11 kg/m² as calculated from the following:    Height as of this encounter: 5' 10\" (1.778 m). Weight as of this encounter: 182 lb (82.6 kg). Intake/Output Summary (Last 24 hours) at 9/19/2022 1356  Last data filed at 9/19/2022 1157      Gross per 24 hour   Intake 10 ml   Output 720 ml   Net -710 ml       Physical Exam:    Blood pressure 112/74, pulse 69, resp. rate 13, SpO2 99 %. General:    Well nourished.     Head:  Normocephalic, atraumatic  Eyes:  Sclerae appear normal.  Pupils equally round. ENT:  Nares appear normal, no drainage. Moist oral mucosa  Neck:  No restricted ROM. Trachea midline   CV:   RRR. No m/r/g. No jugular venous distension. Lungs:   CTAB. No wheezing, rhonchi, or rales. Symmetric expansion. Abdomen: Bowel sounds present. Soft, nontender, nondistended. Extremities: No cyanosis or clubbing. No edema  Skin:     No rashes and normal coloration. Warm and dry. Neuro:  A&Ox3. Left sided weakness (LUE/LLE 4/5)  Psych:  Normal mood and affect.       I have personally reviewed labs and tests showing:  Recent Labs:  Recent Results (from the past 24 hour(s))   CBC    Collection Time: 09/19/22  9:44 AM   Result Value Ref Range    WBC 6.9 4.3 - 11.1 K/uL    RBC 4.87 4.23 - 5.60 M/uL    Hemoglobin 16.1 13.6 - 17.2 g/dL    Hematocrit 44.4 41.1 - 50.3 %    MCV 91.2 79.6 - 97.8 FL    MCH 33.1 (H) 26.1 - 32.9 PG    MCHC 36.3 (H) 31.4 - 35.0 g/dL    RDW 12.0 11.9 - 14.6 %    Platelets 963 420 - 709 K/uL    MPV 9.1 (L) 9.4 - 12.3 FL    nRBC 0.00 0.0 - 0.2 K/uL   Comprehensive Metabolic Panel    Collection Time: 09/19/22  9:44 AM   Result Value Ref Range    Sodium 141 138 - 145 mmol/L    Potassium 4.0 3.5 - 5.1 mmol/L    Chloride 101 98 - 107 mmol/L    CO2 28 21 - 32 mmol/L    Anion Gap 12 7.0 - 16.0 mmol/L    Glucose 144 (H) 65 - 100 mg/dL    BUN 19 (H) 7.0 - 18.0 MG/DL    Creatinine 1.15 0.8 - 1.5 MG/DL    GFR African American >87 >60 ml/min/1.73m2    GFR Non- >60 >60 ml/min/1.73m2    Calcium 9.8 8.3 - 10.4 MG/DL    Total Bilirubin 0.5 0.2 - 1.1 MG/DL    ALT 28 13.0 - 61.0 U/L    AST 27 15 - 37 U/L    Alk Phosphatase 73 45.0 - 117.0 U/L    Total Protein 6.9 6.4 - 8.2 g/dL    Albumin 4.3 3.5 - 5.0 g/dL    Globulin 2.6 2.3 - 3.5 g/dL    Albumin/Globulin Ratio 1.7     Troponin T    Collection Time: 09/19/22  9:44 AM   Result Value Ref Range    Troponin T 8.2 0 - 22 ng/L   Magnesium    Collection Time: 09/19/22  9:44 AM   Result Value Ref Range    Magnesium 1.7 1.2 - 2.6 mg/dL   Lipase    Collection Time: 09/19/22  9:44 AM   Result Value Ref Range    Lipase 56 13 - 60 U/L   Protime-INR    Collection Time: 09/19/22  9:44 AM   Result Value Ref Range    Protime 13.4 12.6 - 14.5 sec    INR 1.0     POCT Glucose    Collection Time: 09/19/22 10:12 AM   Result Value Ref Range    Glucose 120 mg/dL   POCT Glucose    Collection Time: 09/19/22 10:12 AM   Result Value Ref Range    POC Glucose 120 (H) 65 - 100 mg/dL    Performed by: Robert    Drug Screen Psych, Urine    Collection Time: 09/19/22 10:13 AM   Result Value Ref Range    Benzodiazepines, Urine Negative NEG      Opiates, Urine Negative NEG      Amphetamine, Urine Positive (A) NEG      Cocaine, Urine Negative NEG      THC, TH-Cannabinol, Urine Negative NEG     Troponin T    Collection Time: 09/19/22 11:54 AM   Result Value Ref Range    Troponin T 7.2 0 - 22 ng/L       I have personally reviewed imaging studies showing:  CT HEAD WO CONTRAST    Result Date: 9/19/2022  EXAMINATION: CT HEAD WO CONTRAST 9/19/2022 10:05 AM ACCESSION NUMBER: [id number] COMPARISON: None available INDICATION: Stroke Symptoms left-sided weakness and numbness TECHNIQUE: Multiple-row detector helical CT examination of the head without intravenous contrast. Radiation dose reduction techniques were used for this study. Our CT scanners use one or all of the following: Automated exposure control, adjustment of the mA and/or kV according to patient size, iterative reconstruction. FINDINGS: There is no evidence of acute intracranial hemorrhage or extra-axial collections. There is no CT evidence of acute infarction. The ventricles are within normal limits. There is no midline shift. The basilar cisterns are patent. There is no cerebellar tonsillar ectopia or herniation. The imaged portions of the paranasal sinuses and mastoid air cells are well aerated and clear. No evidence of acute intracranial abnormality.      XR CHEST PORTABLE    Result Date: 9/19/2022  EXAMINATION: CHEST RADIOGRAPH 9/19/2022 9:40 AM ACCESSION NUMBER: [id number] INDICATION: Chest Pain COMPARISON: None available TECHNIQUE: A single view of the chest was obtained. FINDINGS: Support Lines and Tubes: None Cardiac Silhouette: Within normal limits in size. Lungs: Low lung volumes with bibasilar atelectasis. Pleura: No pleural effusion. No pneumothorax. Osseous Structures: Unremarkable. Upper Abdomen: Unremarkable. Low lung volumes with bibasilar atelectasis. CTA HEAD NECK W CONTRAST    Result Date: 9/19/2022  History: Syncopal episode on Saturday chest pain with radiation arm numbness and tingling Comments: CT ANGIOGRAM OF THE NECK AND CT ANGIOGRAM OF THE [place] was obtained following the administration of IV contrast. IV contrast was administered to evaluate the arterial vasculature. Reformatted images in the coronal and sagittal planes as well as 3-D imaging was obtained and reviewed on a dedicated PACS and [address]. Radiation reduction dose techniques were used for the study. Our CT scanner use one or all of the following- Automated exposure control, adjustment of the mA and/or KV according the patient size, iterative reconstruction. All measurements are based upon NASCET criteria if appropriate. This study was analyzed by the [facility] [address].Algorithm Contrast: 60 cc of Isovue 370 Findings: CT ANGIOGRAM OF THE NECK: The arch and proximal great vessels are patent. The right and left carotid bulbs are patent. Mild atherosclerotic changes are noted on the right however no significant stenosis. Proximal vertebral arteries are patent. The lung apices are clear. Thyroid gland is unremarkable. CT angiogram of [place]: The petrous, cavernous, and supraclinoid internal carotid arteries are patent. The anterior and middle cerebral arteries are patent.  The distal vertebral arteries are patent however the origin of the basilar artery is occluded however the vessel reconstitutes flow. The posterior cerebral arteries are patent. The dural venous sinuses are patent. 1. 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 The posterior cerebral arteries are patent. Echocardiogram:  No results found for this or any previous visit. Orders Placed This Encounter   Medications    nicotine (NICODERM CQ) 14 MG/24HR 1 patch    OR Linked Order Group     acetaminophen (TYLENOL) tablet 650 mg     acetaminophen (TYLENOL) suppository 650 mg    OR Linked Order Group     ondansetron (ZOFRAN-ODT) disintegrating tablet 4 mg     ondansetron (ZOFRAN) injection 4 mg    polyethylene glycol (GLYCOLAX) packet 17 g    bisacodyl (DULCOLAX) suppository 10 mg    OR Linked Order Group     aspirin EC tablet 81 mg     aspirin suppository 300 mg    atorvastatin (LIPITOR) tablet 80 mg    labetalol (NORMODYNE;TRANDATE) injection 10 mg    traMADol (ULTRAM) tablet 50 mg    DISCONTD: buPROPion (WELLBUTRIN XL) extended release tablet 300 mg    traZODone (DESYREL) tablet 100 mg    buPROPion (WELLBUTRIN SR) extended release tablet 150 mg       Signed:  Bharath Michelle MD    Part of this note may have been written by using a voice dictation software. The note has been proof read but may still contain some grammatical/other typographical errors.

## 2022-09-19 NOTE — PROGRESS NOTES
Pt arrived from [facility] ER. Alert and oriented x4; able to move all extremities. NIH 4 at this time; right-sided facial droop, left-sided weakness/decreased sensation. Dual skin assessment completed with Daniel Torres RN. Integumentary WNL; intact tattoos and piercings. Will continue to monitor.

## 2022-09-19 NOTE — CONSULTS
Consult    Patient: Denise Clayton MRN: [id number]     YOB: 1975  Age: 48 y.o. Sex: male      Subjective:      Denise Clayton is a 48 y.o. male who is being seen for left sided numbness. The patient has history of depression, HTN, borderline diabetes. Endorses alcohol use of 3-4 drinks per day. He reports an episode yesterday of syncope due to becoming overheated. This morning the patient developed chest pain, dyspnea, and left sided numbness. He presented to [facility] and was evaluated by teleneurology. NIHSS was 4 and tenecteplase was given. Patient reports that left arm and leg numbness are improved, but face is unchanged. Past Medical History:   Diagnosis Date    Borderline diabetes     Depression     Hypertension      No past surgical history on file. Family History   Problem Relation Age of Onset    Diabetes Mother     Seizures Father      Social History     Tobacco Use    Smoking status: Never    Smokeless tobacco: Never   Substance Use Topics    Alcohol use:  Yes     Alcohol/week: 12.0 standard drinks     Types: 12 Cans of beer per week      Current Facility-Administered Medications   Medication Dose Route Frequency Provider Last Rate Last Admin    nicotine (NICODERM CQ) 14 MG/24HR 1 patch  1 patch TransDERmal Daily PRN Zuhair Cleary MD        acetaminophen (TYLENOL) tablet 650 mg  650 mg Oral Q4H PRN Zuhair Cleary MD   650 mg at 09/19/22 1528    Or    acetaminophen (TYLENOL) suppository 650 mg  650 mg Rectal Q4H PRN Zuhair Cleary MD        ondansetron (ZOFRAN-ODT) disintegrating tablet 4 mg  4 mg Oral Q8H PRN Zuhair Cleary MD        Or    ondansetron (ZOFRAN) injection 4 mg  4 mg IntraVENous Q6H PRN Zuhair Cleary MD        polyethylene glycol (GLYCOLAX) packet 17 g  17 g Oral Daily PRN Zuhair Cleary MD        bisacodyl (DULCOLAX) suppository 10 mg  10 mg Rectal Daily PRN Zuhair Cleary MD        [START ON 9/20/2022] aspirin EC tablet 81 mg  81 mg Oral Daily Zuhair Cleary MD        Or    Jasmin Abel ON 9/20/2022] aspirin suppository 300 mg  300 mg Rectal Daily Elisabet Wright MD        atorvastatin (LIPITOR) tablet 80 mg  80 mg Oral Nightly Elisabet Wright MD        labetalol (NORMODYNE;TRANDATE) injection 10 mg  10 mg IntraVENous Q10 Min PRN Elisabet Wright MD        traMADol (ULTRAM) tablet 50 mg  50 mg Oral Q6H PRN Elisabet Wright MD        traZODone (DESYREL) tablet 100 mg  100 mg Oral Nightly Elisabet Wright MD        buPROPion [facility]) extended release tablet 150 mg  150 mg Oral BID Elisabet Wright MD            Allergies   Allergen Reactions    Penicillins Hives       Review of Systems:  CONSTITUTIONAL:  Denies weight loss, fever, chills, weakness or fatigue. HEENT:  Eyes:  Denies visual loss, blurred vision, double vision or yellow sclerae. Ears, Nose, Throat:  Denies hearing loss, sneezing, congestion, runny nose or sore throat. SKIN:  Denies rash or itching. CARDIOVASCULAR:  Endorses chest pain, denies chest pressure or chest discomfort. No palpitations or edema. RESPIRATORY:  Endorses shortness of breath, Denies cough or sputum. GASTROINTESTINAL:  Denies anorexia, nausea, vomiting or diarrhea. No abdominal pain or blood. GENITOURINARY:  Denies burning with urination. NEUROLOGICAL:  Endorses syncopal episode yesterday. Denies headache, dizziness, paralysis, ataxia, numbness or tingling in the extremities. Denies change in bowel or bladder control. MUSCULOSKELETAL:  Denies muscle, back pain, joint pain or stiffness. LYMPHATICS:  Denies enlarged nodes. Objective:     Vitals:    09/19/22 1430 09/19/22 1445 09/19/22 1500   BP: 116/72 114/76 112/74   Pulse: 72 76 69   Resp: 21 17 13   TempSrc: Axillary     SpO2: (!) 82% 100% 99%        Physical Exam:  General - Well developed, well nourished, in no apparent distress. Pleasant and conversant. HEENT - Normocephalic, atraumatic. Conjunctiva, tympanic membranes, and oropharynx are clear.    Neck - Supple without masses, no bruits   Cardiovascular - Regular rate and rhythm. Normal S1, S2 without murmurs, rubs, or gallops. Lungs - Clear to auscultation. Abdomen - Soft, nontender with normal bowel sounds. Extremities - Peripheral pulses intact. No edema and no rashes. Neurological examination - Comprehension, attention, memory and reasoning are intact. Language and speech are normal.   On cranial nerve examination, (II, III, IV, VI) pupils are equal, round, and reactive to light. Visual acuity is adequate. Visual fields are full to finger confrontation. Extraocular motility is normal. (V, VII) Face is symmetric and sensation is impaired to light touch on the left. (VIII) Hearing is intact. (IX, X) Palate and uvula elevate symmetrically. Voice is normal. (XI) Shoulder shrug is strong and equal bilaterally. (XII)Tongue is midline. Motor examination - There is normal muscle tone and bulk. Power is full throughout, with exception of very mild pronation on the left. Muscle stretch reflexes are 1+ throughout. Plantar response is flexor bilaterally. Sensation is intact to light touch in LUE, LLE. Cerebellar examination is normal.   NIHSS 2  NIHSS Score:   1a-Level of Consciousness 0  1b-What is Month/Age 0  1c-Open/Close Eyes&Hand 0  2 -Best Gaze 0  3 -Visual Fields 0  4 -Facial Palsy 0  5a-Motor-Left Arm 1  5b-Motor-Right Arm 0  6a-Motor-Left Leg 0  6b-Motor-Right Leg 0  7 -Limb Ataxia 0  8 -Sensory 1  9 -Best Language 0  10-Dysarthria 0  11-Extinction/Inattention 0    No results found for: CHOL, CHOLPOCT, CHOLX, CHLST, CHOLV, HDL, HDLPOC, HDLC, LDL, LDLC, VLDLC, VLDL, TGLX, TRIGL     No results found for: HBA1C, [id number]     CT Results (most recent):  CT reviewed. Results do not populate into note    Most recent MRI   No results found for this or any previous visit. Most recent MRA   No results found for this or any previous visit. Most recent CTA  CTA reviewed. Results do not populate into note.         Most recent Echo  No results found for this or any previous visit. Most recent lipid panels  No results found for: CHOL, CHOLPOCT, CHOLX, CHLST, CHOLV, HDL, HDLPOC, HDLC, LDL, LDLC, VLDLC, VLDL, TGLX, TRIGL    Most recent Hgb A1C  No results found for: HBA1C, [id number]      Assessment:     48year old male with stroke symptoms (left sided numbness/weakness) s/p tenecteplase. CTA showed a basilar artery occlusion. Neurointerventionalists at [facility] were contacted by ED physician. 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 Plan:       Admit to the ICU with neurological assessments and vitals  Every 15 minutes for 2 hours  Every 30 minutes for 6 hours  Every 60 minutes for 16 hours    Maintain blood pressure <180/105 mmHg and give IV labetalol 10 mg for blood pressure over said number or nicardipine 5 - 15 mg/h for persistent elevation. Avoid hydralazine    Hold all antiplatelet medications and anticoagulants (including chemical DVT prophylaxis) for 24 hours post IV tPA. Routine CT scan or MRI 24 hours post IV tPA administration or STAT CT for worsening symptoms     Nursing or physician to perform a bedside dysphagia screen with subsequent speech therapy consult to prevent aspiration pneumonia. Do not insert a Naik catheter or nasogastric tube within 24 hours of IV tPA infusion to prevent bleeding. Avoid prolonged use of Naik catheters to prevent infection. No intramuscular injections or routine blood draws for 24 hours. Continuous pulse oximetry monitoring. Order oxygen by nasal cannula or mask to maintain 02 saturation > 94%. Tylenol 650 mg PO/SD every 4 hours PRN for temperature > 38 and consider other forms of cooling for temperature > 39. Fever is associated with poor outcome and infection should be ruled out.       Tight glycemic control as both hyperglycemia and hypoglycemia contribute to worse outcome     Initiate a high potency statin prior to discharge     Physical therapy and occupational therapy evaluation. Depression screen and smoking cessation counseling if applicable     Diabetes education if applicable       Physiatry consult and case management for rehab placement if indicated      I spent 50 minutes today with the patient, which included chart review, documentation, and greater than 50% of time was spent in direct face-to-face contact, obtaining history, exam, coordinating care, and counseling the patient on medical condition.      Signed By: PEDRO Camargo     September 19, 2022

## 2022-09-20 ENCOUNTER — APPOINTMENT (OUTPATIENT)
Dept: CT IMAGING | Age: 47
DRG: 062 | End: 2022-09-20
Attending: INTERNAL MEDICINE
Payer: COMMERCIAL

## 2022-09-20 ENCOUNTER — APPOINTMENT (OUTPATIENT)
Dept: NON INVASIVE DIAGNOSTICS | Age: 47
DRG: 062 | End: 2022-09-20
Attending: INTERNAL MEDICINE
Payer: COMMERCIAL

## 2022-09-20 ENCOUNTER — APPOINTMENT (OUTPATIENT)
Dept: MRI IMAGING | Age: 47
DRG: 062 | End: 2022-09-20
Attending: INTERNAL MEDICINE
Payer: COMMERCIAL

## 2022-09-20 LAB
ANION GAP SERPL CALC-SCNC: 5 MMOL/L (ref 4–13)
BUN SERPL-MCNC: 14 MG/DL (ref 6–23)
CALCIUM SERPL-MCNC: 9 MG/DL (ref 8.3–10.4)
CHLORIDE SERPL-SCNC: 104 MMOL/L (ref 101–110)
CO2 SERPL-SCNC: 30 MMOL/L (ref 21–32)
CREAT SERPL-MCNC: 1.1 MG/DL (ref 0.8–1.5)
ECHO AO ASC DIAM: 2.4 CM
ECHO AO ASCENDING AORTA INDEX: 1.19 CM/M2
ECHO AO ROOT DIAM: 2.8 CM
ECHO AO ROOT INDEX: 1.39 CM/M2
ECHO AV AREA PEAK VELOCITY: 2.7 CM2
ECHO AV AREA VTI: 2.7 CM2
ECHO AV AREA/BSA PEAK VELOCITY: 1.3 CM2/M2
ECHO AV AREA/BSA VTI: 1.3 CM2/M2
ECHO AV MEAN GRADIENT: 4 MMHG
ECHO AV MEAN VELOCITY: 0.9 M/S
ECHO AV PEAK GRADIENT: 7 MMHG
ECHO AV PEAK VELOCITY: 1.3 M/S
ECHO AV VELOCITY RATIO: 0.85
ECHO AV VTI: 24.6 CM
ECHO BSA: 2.02 M2
ECHO LA AREA 2C: 14.7 CM2
ECHO LA AREA 4C: 16.4 CM2
ECHO LA DIAMETER INDEX: 2.14 CM/M2
ECHO LA DIAMETER: 4.3 CM
ECHO LA MAJOR AXIS: 5.8 CM
ECHO LA MINOR AXIS: 5.4 CM
ECHO LA TO AORTIC ROOT RATIO: 1.54
ECHO LA VOL BP: 36 ML (ref 18–58)
ECHO LA VOL/BSA BIPLANE: 18 ML/M2 (ref 16–34)
ECHO LV E' LATERAL VELOCITY: 12 CM/S
ECHO LV E' SEPTAL VELOCITY: 7 CM/S
ECHO LV EDV A2C: 57 ML
ECHO LV EDV A4C: 76 ML
ECHO LV EDV INDEX A4C: 38 ML/M2
ECHO LV EDV NDEX A2C: 28 ML/M2
ECHO LV EJECTION FRACTION A2C: 51 %
ECHO LV EJECTION FRACTION A4C: 59 %
ECHO LV EJECTION FRACTION BIPLANE: 56 % (ref 55–100)
ECHO LV ESV A2C: 28 ML
ECHO LV ESV A4C: 31 ML
ECHO LV ESV INDEX A2C: 14 ML/M2
ECHO LV ESV INDEX A4C: 15 ML/M2
ECHO LV FRACTIONAL SHORTENING: 37 % (ref 28–44)
ECHO LV INTERNAL DIMENSION DIASTOLE INDEX: 2.29 CM/M2
ECHO LV INTERNAL DIMENSION DIASTOLIC: 4.6 CM (ref 4.2–5.9)
ECHO LV INTERNAL DIMENSION SYSTOLIC INDEX: 1.44 CM/M2
ECHO LV INTERNAL DIMENSION SYSTOLIC: 2.9 CM
ECHO LV IVSD: 1.1 CM (ref 0.6–1)
ECHO LV MASS 2D: 158.8 G (ref 88–224)
ECHO LV MASS INDEX 2D: 79 G/M2 (ref 49–115)
ECHO LV POSTERIOR WALL DIASTOLIC: 0.9 CM (ref 0.6–1)
ECHO LV RELATIVE WALL THICKNESS RATIO: 0.39
ECHO LVOT AREA: 3.1 CM2
ECHO LVOT AV VTI INDEX: 0.87
ECHO LVOT DIAM: 2 CM
ECHO LVOT MEAN GRADIENT: 3 MMHG
ECHO LVOT PEAK GRADIENT: 5 MMHG
ECHO LVOT PEAK VELOCITY: 1.1 M/S
ECHO LVOT STROKE VOLUME INDEX: 33.4 ML/M2
ECHO LVOT SV: 67.2 ML
ECHO LVOT VTI: 21.4 CM
ECHO MV A VELOCITY: 0.8 M/S
ECHO MV E DECELERATION TIME (DT): 230 MS
ECHO MV E VELOCITY: 0.81 M/S
ECHO MV E/A RATIO: 1.01
ECHO MV E/E' LATERAL: 6.75
ECHO MV E/E' RATIO (AVERAGED): 9.16
ECHO MV E/E' SEPTAL: 11.57
ECHO PV ACCELERATION TIME (AT): 145 MS
ECHO PV MAX VELOCITY: 1.2 M/S
ECHO PV PEAK GRADIENT: 6 MMHG
ECHO RV BASAL DIMENSION: 2.7 CM
ECHO RV INTERNAL DIMENSION: 3.4 CM
ECHO RV TAPSE: 1.7 CM (ref 1.7–?)
ECHO TV REGURGITANT MAX VELOCITY: 2.28 M/S
ECHO TV REGURGITANT PEAK GRADIENT: 21 MMHG
ERYTHROCYTE [DISTWIDTH] IN BLOOD BY AUTOMATED COUNT: 12.4 % (ref 11.9–14.6)
GLUCOSE BLD STRIP.AUTO-MCNC: 122 MG/DL (ref 65–100)
GLUCOSE SERPL-MCNC: 113 MG/DL (ref 65–100)
HCT VFR BLD AUTO: 44.9 % (ref 41.1–50.3)
HGB BLD-MCNC: 15.7 G/DL (ref 13.6–17.2)
LV EF: 58 %
LVEF MODALITY: ABNORMAL
MAGNESIUM SERPL-MCNC: 1.9 MG/DL (ref 1.8–2.4)
MCH RBC QN AUTO: 32.3 PG (ref 26.1–32.9)
MCHC RBC AUTO-ENTMCNC: 35 G/DL (ref 31.4–35)
MCV RBC AUTO: 92.4 FL (ref 79.6–97.8)
NRBC # BLD: 0 K/UL (ref 0–0.2)
PLATELET # BLD AUTO: 253 K/UL (ref 150–450)
PMV BLD AUTO: 9.5 FL (ref 9.4–12.3)
POTASSIUM SERPL-SCNC: 3.5 MMOL/L (ref 3.5–5.1)
RBC # BLD AUTO: 4.86 M/UL (ref 4.23–5.6)
SERVICE CMNT-IMP: ABNORMAL
SODIUM SERPL-SCNC: 139 MMOL/L (ref 138–145)
WBC # BLD AUTO: 8.5 K/UL (ref 4.3–11.1)

## 2022-09-20 PROCEDURE — 6370000000 HC RX 637 (ALT 250 FOR IP): Performed by: INTERNAL MEDICINE

## 2022-09-20 PROCEDURE — 83735 ASSAY OF MAGNESIUM: CPT

## 2022-09-20 PROCEDURE — 92610 EVALUATE SWALLOWING FUNCTION: CPT

## 2022-09-20 PROCEDURE — 99232 SBSQ HOSP IP/OBS MODERATE 35: CPT | Performed by: NURSE PRACTITIONER

## 2022-09-20 PROCEDURE — 70551 MRI BRAIN STEM W/O DYE: CPT

## 2022-09-20 PROCEDURE — 95816 EEG AWAKE AND DROWSY: CPT | Performed by: PSYCHIATRY & NEUROLOGY

## 2022-09-20 PROCEDURE — 97165 OT EVAL LOW COMPLEX 30 MIN: CPT

## 2022-09-20 PROCEDURE — 95816 EEG AWAKE AND DROWSY: CPT

## 2022-09-20 PROCEDURE — 80048 BASIC METABOLIC PNL TOTAL CA: CPT

## 2022-09-20 PROCEDURE — 93306 TTE W/DOPPLER COMPLETE: CPT

## 2022-09-20 PROCEDURE — 70450 CT HEAD/BRAIN W/O DYE: CPT

## 2022-09-20 PROCEDURE — 85027 COMPLETE CBC AUTOMATED: CPT

## 2022-09-20 PROCEDURE — 6360000002 HC RX W HCPCS: Performed by: FAMILY MEDICINE

## 2022-09-20 PROCEDURE — 97161 PT EVAL LOW COMPLEX 20 MIN: CPT

## 2022-09-20 PROCEDURE — 97112 NEUROMUSCULAR REEDUCATION: CPT

## 2022-09-20 PROCEDURE — 2100000000 HC CCU R&B

## 2022-09-20 PROCEDURE — 94761 N-INVAS EAR/PLS OXIMETRY MLT: CPT

## 2022-09-20 PROCEDURE — 99221 1ST HOSP IP/OBS SF/LOW 40: CPT | Performed by: PHYSICAL MEDICINE & REHABILITATION

## 2022-09-20 PROCEDURE — 93306 TTE W/DOPPLER COMPLETE: CPT | Performed by: INTERNAL MEDICINE

## 2022-09-20 PROCEDURE — 97530 THERAPEUTIC ACTIVITIES: CPT

## 2022-09-20 PROCEDURE — 82962 GLUCOSE BLOOD TEST: CPT

## 2022-09-20 RX ORDER — LORAZEPAM 1 MG/1
3 TABLET ORAL
Status: DISCONTINUED | OUTPATIENT
Start: 2022-09-20 | End: 2022-09-22 | Stop reason: HOSPADM

## 2022-09-20 RX ORDER — LORAZEPAM 1 MG/1
2 TABLET ORAL
Status: DISCONTINUED | OUTPATIENT
Start: 2022-09-20 | End: 2022-09-22 | Stop reason: HOSPADM

## 2022-09-20 RX ORDER — LORAZEPAM 2 MG/ML
4 INJECTION INTRAMUSCULAR
Status: DISCONTINUED | OUTPATIENT
Start: 2022-09-20 | End: 2022-09-22 | Stop reason: HOSPADM

## 2022-09-20 RX ORDER — LANOLIN ALCOHOL/MO/W.PET/CERES
100 CREAM (GRAM) TOPICAL DAILY
Status: DISCONTINUED | OUTPATIENT
Start: 2022-09-21 | End: 2022-09-22 | Stop reason: HOSPADM

## 2022-09-20 RX ORDER — LORAZEPAM 2 MG/ML
3 INJECTION INTRAMUSCULAR
Status: DISCONTINUED | OUTPATIENT
Start: 2022-09-20 | End: 2022-09-22 | Stop reason: HOSPADM

## 2022-09-20 RX ORDER — LORAZEPAM 2 MG/ML
1 INJECTION INTRAMUSCULAR ONCE
Status: COMPLETED | OUTPATIENT
Start: 2022-09-20 | End: 2022-09-20

## 2022-09-20 RX ORDER — LORAZEPAM 1 MG/1
4 TABLET ORAL
Status: DISCONTINUED | OUTPATIENT
Start: 2022-09-20 | End: 2022-09-22 | Stop reason: HOSPADM

## 2022-09-20 RX ORDER — LORAZEPAM 2 MG/ML
1 INJECTION INTRAMUSCULAR
Status: DISCONTINUED | OUTPATIENT
Start: 2022-09-20 | End: 2022-09-22 | Stop reason: HOSPADM

## 2022-09-20 RX ORDER — SODIUM CHLORIDE 9 MG/ML
INJECTION, SOLUTION INTRAVENOUS PRN
Status: DISCONTINUED | OUTPATIENT
Start: 2022-09-20 | End: 2022-09-22 | Stop reason: HOSPADM

## 2022-09-20 RX ORDER — SODIUM CHLORIDE 0.9 % (FLUSH) 0.9 %
5-40 SYRINGE (ML) INJECTION PRN
Status: DISCONTINUED | OUTPATIENT
Start: 2022-09-20 | End: 2022-09-22 | Stop reason: HOSPADM

## 2022-09-20 RX ORDER — LORAZEPAM 1 MG/1
1 TABLET ORAL
Status: DISCONTINUED | OUTPATIENT
Start: 2022-09-20 | End: 2022-09-22 | Stop reason: HOSPADM

## 2022-09-20 RX ORDER — LORAZEPAM 2 MG/ML
2 INJECTION INTRAMUSCULAR
Status: DISCONTINUED | OUTPATIENT
Start: 2022-09-20 | End: 2022-09-22 | Stop reason: HOSPADM

## 2022-09-20 RX ADMIN — LOPERAMIDE HYDROCHLORIDE 2 MG: 2 CAPSULE ORAL at 10:08

## 2022-09-20 RX ADMIN — BUPROPION HYDROCHLORIDE 150 MG: 150 TABLET, EXTENDED RELEASE ORAL at 08:45

## 2022-09-20 RX ADMIN — ASPIRIN 81 MG: 81 TABLET ORAL at 15:09

## 2022-09-20 RX ADMIN — TRAZODONE HYDROCHLORIDE 100 MG: 50 TABLET ORAL at 21:28

## 2022-09-20 RX ADMIN — BUPROPION HYDROCHLORIDE 150 MG: 150 TABLET, EXTENDED RELEASE ORAL at 19:25

## 2022-09-20 RX ADMIN — LORAZEPAM 1 MG: 2 INJECTION INTRAMUSCULAR; INTRAVENOUS at 19:49

## 2022-09-20 RX ADMIN — ATORVASTATIN CALCIUM 80 MG: 80 TABLET, FILM COATED ORAL at 19:25

## 2022-09-20 ASSESSMENT — LIFESTYLE VARIABLES
HOW MANY STANDARD DRINKS CONTAINING ALCOHOL DO YOU HAVE ON A TYPICAL DAY: 5 OR 6
HOW OFTEN DO YOU HAVE A DRINK CONTAINING ALCOHOL: 4 OR MORE TIMES A WEEK

## 2022-09-20 ASSESSMENT — PAIN SCALES - GENERAL
PAINLEVEL_OUTOF10: 0

## 2022-09-20 NOTE — CONSULTS
Physical Medicine & Rehabilitation Consult    Subjective:     Date of Consultation:  September 20, 2022  Referring Provider: Dr Amparo Goldberg is a 48 y.o. male who is being evaluated at the request of the Hospitalist service for consideration for inpatient rehabilitation following admission for stroke like symptoms. HPI: The patient is a right-hand dominant, previously functionally independent 44yo WM with a PMH of HTN, depression, and borderline diabetes who presented to the ED on 9/19 c/o a syncopal episode on Saturday as he was at the game and became overheated. He states that he was unconscious for 15min. No sz like activity. No hx of migraines. When he went to work on 9/19 he reports getting SOB followed by left sided chest pain. CODE S was called in the ED with NIHSS of 4 Telenuro recommended tPA and he received it at 1058. 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  discussed Dr. Nguyễn Haider at [facility]. She as well as neuro interventionalists looked over CTA and noted no occlusion or thrombus. States that there is no intervention that they would perform at this time. NIHSS currently 2. He says he has numbness on the left side of his face as well. MRI pending . 2D ECHO wnl. No intracardiac shunt. EF 55-60%  URINE + for amphetamines. Pt endorses having 3-4 alcohol beverages a day    Principal Problem:    Cerebrovascular accident (CVA) ([address]  Active Problems:    Hypertension    Borderline diabetes    Depression  Resolved Problems:    * No resolved hospital problems. *    Past Medical History:   Diagnosis Date    Borderline diabetes     Depression     Hypertension       No past surgical history on file.    Family History   Problem Relation Age of Onset    Diabetes Mother     Seizures Father       Social History     Tobacco Use    Smoking status: Never    Smokeless tobacco: Never   Substance Use Topics    Alcohol use: Yes     Alcohol/week: 12.0 standard drinks     Types: 12 Cans of beer per week     Prior to Admission medications    Medication Sig Start Date End Date Taking? Authorizing Provider   olmesartan-hydroCHLOROthiazide (BENICAR HCT) 20-12.5 MG per tablet Take 1 tablet by mouth daily 21  Yes Historical Provider, MD   traZODone (DESYREL) 50 MG tablet Take 100 mg by mouth at bedtime 22  Yes Historical Provider, MD   metFORMIN (GLUCOPHAGE) 500 MG tablet Take 500 mg by mouth daily (with breakfast)    Historical Provider, MD   buPROPion (WELLBUTRIN XL) 300 MG extended release tablet Take 300 mg by mouth every morning    Historical Provider, MD     Allergies   Allergen Reactions    Cashews [Macadamia Nut Oil] Anaphylaxis    Coconut (Cocos Nucifera) Allergy Skin Test Itching    Penicillins Hives        Review of Systems:   CONSTITUTIONAL:  Denies weight loss, fever, chills, weakness or fatigue. HEENT:  Eyes:  Denies visual loss, blurred vision, double vision or yellow sclerae. Ears, Nose, Throat:  Denies hearing loss, sneezing, congestion, runny nose or sore throat. SKIN:  Denies rash or itching. CARDIOVASCULAR:  Endorses chest pain, denies chest pressure or chest discomfort. No palpitations or edema. RESPIRATORY:  Endorses shortness of breath, Denies cough or sputum. GASTROINTESTINAL:  Denies anorexia, nausea, vomiting or diarrhea. No abdominal pain or blood. GENITOURINARY:  Denies burning with urination. NEUROLOGICAL:  Endorses syncopal episode yesterday. Denies headache, dizziness, paralysis, ataxia, numbness or tingling in the extremities. Denies change in bowel or bladder control. MUSCULOSKELETAL:  Denies muscle, back pain, joint pain or stiffness. LYMPHATICS:  Denies enlarged nodes. Objective:     Vitals:  Blood pressure 107/69, pulse 88, temperature 98 °F (36.7 °C), temperature source Oral, resp. rate 16, height 5' 10\" (1.778 m), weight 182 lb (82.6 kg), SpO2 98 %.   Temp (24hrs), Av.1 °F (36.7 °C), Min:97.9 °F (36.6 °C), Max:98.5 °F (36.9 °C)      Physical Exam:  General - Well developed, well nourished, in no apparent distress. Pleasant and conversant. HEENT - Normocephalic, atraumatic. Conjunctiva, tympanic membranes, and oropharynx are clear. Neck - Supple without masses, no bruits   Cardiovascular - Regular rate and rhythm. Normal S1, S2 without murmurs, rubs, or gallops. Lungs - Clear to auscultation. Abdomen - Soft, nontender with normal bowel sounds. Extremities - Peripheral pulses intact. No edema and no rashes. Multiple tattoos   NEURO; speech clear. Face symm. Visual acuity intact, PERRLA  Nl muscle bulk and tone. Trace pronation LUE. No dysmetria or ataxia. Sensation is intact left hemibody  Muscle stretch reflexes 1+ symm  Pt with some give away weakness L shoulder flexion and biceps but nl strength.      Labs/Studies:  Recent Results (from the past 72 hour(s))   CBC    Collection Time: 09/19/22  9:44 AM   Result Value Ref Range    WBC 6.9 4.3 - 11.1 K/uL    RBC 4.87 4.23 - 5.60 M/uL    Hemoglobin 16.1 13.6 - 17.2 g/dL    Hematocrit 44.4 41.1 - 50.3 %    MCV 91.2 79.6 - 97.8 FL    MCH 33.1 (H) 26.1 - 32.9 PG    MCHC 36.3 (H) 31.4 - 35.0 g/dL    RDW 12.0 11.9 - 14.6 %    Platelets 851 519 - 445 K/uL    MPV 9.1 (L) 9.4 - 12.3 FL    nRBC 0.00 0.0 - 0.2 K/uL   Comprehensive Metabolic Panel    Collection Time: 09/19/22  9:44 AM   Result Value Ref Range    Sodium 141 138 - 145 mmol/L    Potassium 4.0 3.5 - 5.1 mmol/L    Chloride 101 98 - 107 mmol/L    CO2 28 21 - 32 mmol/L    Anion Gap 12 7.0 - 16.0 mmol/L    Glucose 144 (H) 65 - 100 mg/dL    BUN 19 (H) 7.0 - 18.0 MG/DL    Creatinine 1.15 0.8 - 1.5 MG/DL    GFR African American >87 >60 ml/min/1.73m2    GFR Non- >60 >60 ml/min/1.73m2    Calcium 9.8 8.3 - 10.4 MG/DL    Total Bilirubin 0.5 0.2 - 1.1 MG/DL    ALT 28 13.0 - 61.0 U/L    AST 27 15 - 37 U/L    Alk Phosphatase 73 45.0 - 117.0 U/L    Total Protein 6.9 6.4 - 8.2 g/dL    Albumin 4.3 3.5 - 5.0 g/dL    Globulin 2.6 2.3 - 3.5 g/dL    Albumin/Globulin Ratio 1.7     Troponin T    Collection Time: 09/19/22  9:44 AM   Result Value Ref Range    Troponin T 8.2 0 - 22 ng/L   Magnesium    Collection Time: 09/19/22  9:44 AM   Result Value Ref Range    Magnesium 1.7 1.2 - 2.6 mg/dL   Lipase    Collection Time: 09/19/22  9:44 AM   Result Value Ref Range    Lipase 56 13 - 60 U/L   Protime-INR    Collection Time: 09/19/22  9:44 AM   Result Value Ref Range    Protime 13.4 12.6 - 14.5 sec    INR 1.0     POCT Glucose    Collection Time: 09/19/22 10:12 AM   Result Value Ref Range    Glucose 120 mg/dL   POCT Glucose    Collection Time: 09/19/22 10:12 AM   Result Value Ref Range    POC Glucose 120 (H) 65 - 100 mg/dL    Performed by: Robert    Drug Screen Psych, Urine    Collection Time: 09/19/22 10:13 AM   Result Value Ref Range    Benzodiazepines, Urine Negative NEG      Opiates, Urine Negative NEG      Amphetamine, Urine Positive (A) NEG      Cocaine, Urine Negative NEG      THC, TH-Cannabinol, Urine Negative NEG     Troponin T    Collection Time: 09/19/22 11:54 AM   Result Value Ref Range    Troponin T 7.2 0 - 22 ng/L   Hemoglobin A1c    Collection Time: 09/19/22  6:25 PM   Result Value Ref Range    Hemoglobin A1C 5.7 (H) 4.8 - 5.6 %    eAG 117 mg/dL   Troponin    Collection Time: 09/19/22  6:25 PM   Result Value Ref Range    Troponin, High Sensitivity 4.5 0 - 14 pg/mL   Lipid Panel    Collection Time: 09/19/22  6:25 PM   Result Value Ref Range    Cholesterol, Total 172 <200 MG/DL    Triglycerides 232 (H) 35 - 150 MG/DL    HDL 36 (L) 40 - 60 MG/DL    LDL Calculated 89.6 <100 MG/DL    VLDL Cholesterol Calculated 46.4 (H) 6.0 - 23.0 MG/DL    Chol/HDL Ratio 4.8     TSH with Reflex    Collection Time: 09/19/22  6:25 PM   Result Value Ref Range    TSH w Free Thyroid if Abnormal 1.64 0.358 - 3.740 UIU/ML   POCT Glucose    Collection Time: 09/19/22  9:40 PM   Result Value Ref Range    POC Glucose 139 (H) 65 - 100 mg/dL    Performed by: Arely    Basic Metabolic Panel w/ Reflex to MG    Collection Time: 09/20/22  4:12 AM   Result Value Ref Range    Sodium 139 138 - 145 mmol/L    Potassium 3.5 3.5 - 5.1 mmol/L    Chloride 104 101 - 110 mmol/L    CO2 30 21 - 32 mmol/L    Anion Gap 5 4 - 13 mmol/L    Glucose 113 (H) 65 - 100 mg/dL    BUN 14 6 - 23 MG/DL    Creatinine 1.10 0.8 - 1.5 MG/DL    GFR African American >60 >60 ml/min/1.73m2    GFR Non- >60 >60 ml/min/1.73m2    Calcium 9.0 8.3 - 10.4 MG/DL   CBC    Collection Time: 09/20/22  4:12 AM   Result Value Ref Range    WBC 8.5 4.3 - 11.1 K/uL    RBC 4.86 4.23 - 5.6 M/uL    Hemoglobin 15.7 13.6 - 17.2 g/dL    Hematocrit 44.9 41.1 - 50.3 %    MCV 92.4 79.6 - 97.8 FL    MCH 32.3 26.1 - 32.9 PG    MCHC 35.0 31.4 - 35.0 g/dL    RDW 12.4 11.9 - 14.6 %    Platelets 457 779 - 286 K/uL    MPV 9.5 9.4 - 12.3 FL    nRBC 0.00 0.0 - 0.2 K/uL   Magnesium    Collection Time: 09/20/22  4:12 AM   Result Value Ref Range    Magnesium 1.9 1.8 - 2.4 mg/dL   POCT Glucose    Collection Time: 09/20/22  6:03 AM   Result Value Ref Range    POC Glucose 122 (H) 65 - 100 mg/dL    Performed by: Arely    Transthoracic echocardiogram (TTE) complete with contrast, bubble, strain, and 3D PRN    Collection Time: 09/20/22  9:52 AM   Result Value Ref Range    LV EDV A2C 57 mL    LV EDV A4C 76 mL    LV ESV A2C 28 mL    LV ESV A4C 31 mL    IVSd 1.1 (A) 0.6 - 1.0 cm    LVIDd 4.6 4.2 - 5.9 cm    LVIDs 2.9 cm    LVOT Diameter 2.0 cm    LVOT Mean Gradient 3 mmHg    LVOT VTI 21.4 cm    LVOT Peak Velocity 1.1 m/s    LVOT Peak Gradient 5 mmHg    LVPWd 0.9 0.6 - 1.0 cm    LV E' Lateral Velocity 12 cm/s    LV E' Septal Velocity 7 cm/s    LV Ejection Fraction A2C 51 %    LV Ejection Fraction A4C 59 %    EF BP 56 55 - 100 %    LVOT Area 3.1 cm2    LVOT SV 67.2 ml    LA Minor Axis 5.4 cm    LA Major Axis 5.8 cm    LA Area 2C 14.7 cm2    LA Area 4C 16.4 cm2    LA Volume BP 36 18 - 58 mL    LA Diameter 4.3 cm    AV Mean Velocity 0.9 m/s    AV Mean Gradient 4 mmHg    AV VTI 24.6 cm    AV Peak Velocity 1.3 m/s    AV Peak Gradient 7 mmHg    AV Area by VTI 2.7 cm2    AV Area by Peak Velocity 2.7 cm2    Aortic Root 2.8 cm    Ascending Aorta 2.4 cm    MV E Wave Deceleration Time 230.0 ms    MV A Velocity 0.80 m/s    MV E Velocity 0.81 m/s    PV .0 ms    PV Max Velocity 1.2 m/s    PV Peak Gradient 6 mmHg    RVIDd 3.4 cm    RV Basal Dimension 2.7 cm    TAPSE 1.7 1.7 cm    TR Max Velocity 2.28 m/s    TR Peak Gradient 21 mmHg    Body Surface Area 2.02 m2    Fractional Shortening 2D 37 28 - 44 %    LV ESV Index A4C 15 mL/m2    LV EDV Index A4C 38 mL/m2    LV ESV Index A2C 14 mL/m2    LV EDV Index A2C 28 mL/m2    LVIDd Index 2.29 cm/m2    LVIDs Index 1.44 cm/m2    LV RWT Ratio 0.39     LV Mass 2D 158.8 88 - 224 g    LV Mass 2D Index 79.0 49 - 115 g/m2    MV E/A 1.01     E/E' Ratio (Averaged) 9.16     E/E' Lateral 6.75     E/E' Septal 11.57     LA Volume Index BP 18 16 - 34 ml/m2    LVOT Stroke Volume Index 33.4 mL/m2    LA Size Index 2.14 cm/m2    LA/AO Root Ratio 1.54     Ao Root Index 1.39 cm/m2    Ascending Aorta Index 1.19 cm/m2    AV Velocity Ratio 0.85     LVOT:AV VTI Index 0.87     MEGAN/BSA VTI 1.3 cm2/m2    MEGAN/BSA Peak Velocity 1.3 cm2/m2         Impression / Assessment:     Principal Problem:    Cerebrovascular accident (CVA) ([address]  Active Problems:    Hypertension    Borderline diabetes    Depression  Resolved Problems:    * No resolved hospital problems. *      Plan / Recommendations / Medical Decision Making:     Recommendations:   Continue acute rehab program  Coordination of rehab / medical care  Counseling of PM&R care issues management  Monitoring and management of medical conditions per plan of care / orders  -repeat CT remains neg/ no  hemorrhage  -cont hi intensity statin, Aspirin to be added 24hrs post TPA.   -permissive HTN first 24hrs  PT/OT documentation pending  MRI pending  -substance use; stress abstinence      Discussion with Family / Caregiver / Staff    Reviewed Therapies / Gume Sams / Parisa Xavier / Records    Thank you very much for this referral. We appreciate the opportunity to participate in this patient's care. We will continue to follow.

## 2022-09-20 NOTE — PROGRESS NOTES
OCCUPATIONAL THERAPY Initial Assessment, Daily Note, and AM       OT Visit Days: 1   Acknowledge Orders  Time  OT Charge Capture  Rehab Caseload Tracker      Leslie Farmer is a 48 y.o. male   PRIMARY DIAGNOSIS: Cerebrovascular accident (CVA) ([facility] [address]  Acute cerebrovascular accident (CVA) due to ischemia ([facility] [address] [I63.9]       Reason for Referral: Generalized Muscle Weakness (M62.81)  Inpatient: Payor: Gali Cordoba / Plan: [employer] [place] / Product Type: *No Product type* /     ASSESSMENT:     REHAB RECOMMENDATIONS:   Recommendation to date pending progress:  Setting:  No further skilled therapy after discharge from hospital    Equipment:    To Be Determined     ASSESSMENT:  Mr. Cady Carter presents with deficits in overall strength, activity tolerance, ADL performance, and functional mobility. Presents in ICU for CVA workup after onset of L sided weakness and numbness as well as L sided facial numbness. RUE intact; mild deficits in LUE strength (3/5), sensation, and coordination however all WFL. SBA for functional bed mobility/supine <> sit transfer to EOB; intact EOB unsupported sitting balance. SBA for functional mobility in room. Mild activity tolerance deficits noted during mobility otherwise doing well. Returned to room to sitting upright in chair. At this time, Leslie Farmer is functioning below baseline for ADL performance and functional mobility. Patient would benefit from skilled OT services to address goals and plan of care.       [address] [facility] [address] [address] AM-PAC 6 Clicks Daily Activity Inpatient Short Form:    AM-PAC Daily Activity Inpatient   How much help for putting on and taking off regular lower body clothing?: A Little  How much help for Bathing?: A Little  How much help for Toileting?: A Little  How much help for putting on and taking off regular upper body clothing?: None  How much help for taking care of personal grooming?: None  How much help for eating meals?: None  AM-PAC Inpatient Daily Activity Raw Score: 21  AM-PAC Inpatient ADL T-Scale Score : 44.27  ADL Inpatient CMS 0-100% Score: 32.79  ADL Inpatient CMS G-Code Modifier : CJ           SUBJECTIVE:     Mr. Darryle Mailman states, \"I'm a [employer] fan. \"     Social/Functional    Lives with wife in [place] home with 4 steps to enter. Independent at baseline for Adls and functional mobility. Still works and drives.    OBJECTIVE:     Terrilyn Banker / Sarah Hidden / AIRWAY: IV    RESTRICTIONS/PRECAUTIONS:       PAIN: VITALS / O2:   Pre Treatment:       Numeric 0-10  0/10    Post Treatment:   0/10       Vitals        WFL    Oxygen     WFL        GROSS EVALUATION: INTACT IMPAIRED   (See Comments)   UE AROM [] []   UE PROM [] []   Strength []  Mild L sided weakness     Posture / Balance []  Generally decreased; fair + dynamic standing balance - deficits mostly related to numbness   Sensation [x]     Coordination [x]       Tone [x]       Edema [x]    Activity Tolerance []    Generally decreased    Hand Dominance R [] L []      COGNITION/  PERCEPTION: INTACT IMPAIRED   (See Comments)   Orientation [x]     Vision [x]     Hearing [x]     Cognition  [x]     Perception [x]       MOBILITY: I Mod I S SBA CGA Min Mod Max Total  NT x2 Comments:   Bed Mobility    Rolling [] [] [] [x] [] [] [] [] [] [] []    Supine to Sit [] [] [] [x] [] [] [] [] [] [] []    Scooting [] [] [] [x] [] [] [] [] [] [] []    Sit to Supine [] [] [] [] [] [] [] [] [] [] []    Transfers    Sit to Stand [] [] [] [x] [] [] [] [] [] [] []    Bed to Chair [] [] [] [x] [] [] [] [] [] [] []    Stand to Sit [] [] [] [x] [] [] [] [] [] [] []    Tub/Shower [] [] [] [] [] [] [] [] [] [] []     Toilet [] [] [] [] [] [] [] [] [] [] []      [] [] [] [] [] [] [] [] [] [] []    I=Independent, Mod I=Modified Independent, S=Supervision/Setup, SBA=Standby Assistance, CGA=Contact Guard Assistance, Min=Minimal Assistance, Mod=Moderate Assistance, Max=Maximal Assistance, Total=Total Assistance, NT=Not Tested    ACTIVITIES OF DAILY LIVING: I Mod I S SBA CGA Min Mod Max Total NT Comments   BASIC ADLs:              Upper Body Bathing  [] [] [] [] [] [] [] [] [] [x]    Lower Body Bathing [] [] [] [] [] [] [] [] [] [x]    Toileting [] [] [] [] [] [] [] [] [] [x]    Upper Body Dressing [] [] [] [] [] [] [] [] [] [x]    Lower Body Dressing [] [] [x] [] [] [] [] [] [] []    Feeding [] [] [] [] [] [] [] [] [] [x]    Grooming [] [] [] [] [] [] [] [] [] [x]    Personal Device Care [] [] [] [] [] [] [] [] [] [x]    Functional Mobility [] [] [] [x] [] [] [] [] [] []    I=Independent, Mod I=Modified Independent, S=Supervision/Setup, SBA=Standby Assistance, CGA=Contact Guard Assistance, Min=Minimal Assistance, Mod=Moderate Assistance, Max=Maximal Assistance, Total=Total Assistance, NT=Not Tested    PLAN:     FREQUENCY/DURATION   OT Plan of Care: 3 times/week for duration of hospital stay or until stated goals are met, whichever comes first.    ACUTE OCCUPATIONAL THERAPY GOALS:   (Developed with and agreed upon by patient and/or caregiver.)  1. Patient will complete lower body bathing and dressing with MOD I and adaptive equipment as needed. 2. Patient will complete toileting with MOD I.   3. Patient will tolerate 30 minutes of OT treatment with 1-2 rest breaks to increase activity tolerance for ADLs. 4. Patient will complete functional transfers with MOD I and adaptive equipment as needed. 5. Patient will complete functional mobility for household distances with MOD I and adaptive equipment as needed. 6. Patient will complete self-grooming while standing edge of sink with MOD I and adaptive equipment as needed.     Timeframe: 7 visits         PROBLEM LIST:   (Skilled intervention is medically necessary to address:)  Decreased ADL/Functional Activities  Decreased Activity Tolerance  Decreased AROM/PROM  Decreased Balance  Decreased Coordination  Decreased Gait Ability  Decreased Strength   INTERVENTIONS PLANNED:  (Benefits and precautions of occupational therapy have been discussed with the patient.)  Self Care Training  Therapeutic Activity  Therapeutic Exercise/HEP  Neuromuscular Re-education  Manual Therapy  Education         TREATMENT:     EVALUATION: LOW COMPLEXITY: (Untimed Charge)    TREATMENT:   Neuromuscular Re-education (10 Minutes): Neuromuscular Re-education included Balance Training, Coordination training, Postural training, Sitting balance training, and Standing balance training to improve Balance, Coordination, and Postural Control. TREATMENT GRID:  N/A    AFTER TREATMENT PRECAUTIONS: Call light within reach, Chair, Needs within reach, RN notified, and Visitors at bedside    INTERDISCIPLINARY COLLABORATION:  RN/ PCT, PT/ PTA, and OT/ PLATA    EDUCATION:  Education Given To: Patient; Family  Education Provided: Role of Therapy;Plan of Care  Barriers to Learning: None  Education Outcome: Verbalized understanding;Demonstrated understanding      TOTAL TREATMENT DURATION AND TIME:  Time In: [address]  Time Out: [place]  Minutes: SUSSY Quintero

## 2022-09-20 NOTE — PROGRESS NOTES
Hospitalist Progress Note   Admit Date:  2022  2:24 PM   Name:  Shazia Cam   Age:  48 y.o. Sex:  male  :  1975   MRN:  [id number]   Room:  [address] [place] [address]    Presenting Complaint: No chief complaint on file. Reason(s) for Admission: Acute cerebrovascular accident (CVA) due to ischemia [facility]) [I63.9]     Hospital Course:   Shazia Cam is a 48 y.o. male with medical history of prediabetes, hypertension, depression who presented with chest pain and left-sided weakness. Patient reports having his first episode of left-sided, pressure-like chest pain on Saturday while attending [facility] versus [employer] football game. Reports getting very hot and subsequently passed out for 15 minutes. He experienced another episode of chest pain while at work. Reports shortness of breath with walking short distance this morning as well. Reports left-sided numbness, tingling. Symptoms started around 7:45AM.   Saw his in house PCP at work who sent him to ED. CODE S was called in the ED with NIHSS of 2. Telenuro recommended tPA and he received it at 1058. 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  discussed Dr. Mayela Singh at [facility]. She as well as neuro interventionalists looked over CTA and noted no occlusion or thrombus. States that there is no intervention that they would perform at this time. States the patient can remain at [facility] and does not need to be transferred to [facility]. Laboratory work-up is unremarkable. Urine toxicology is positive for amphetamines. Chest x-ray with low lung volumes with bibasilar atelectasis. EKG with normal sinus rhythm without ST or T wave abnormalities. Subjective & 24hr Events (22): Says his left sided numbness and weakness better      Assessment & Plan:   Acute Ischemic CVA s/p tPA  Presented with left sided weakness with NIHSS of 2 on admission.    Teleneuro evaluated and recommended tPA @ 1058. CT Head without acute abnormalities. 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  discussed Dr. Germain Treadwell at [facility]. She as well as neuro interventionalists looked over CTA and noted no occlusion or thrombus. States that there is no intervention that they would perform at this time. States the patient can remain at [facility] and does not need to be transferred to [facility]. 9/20- MRI pending  Improving numbness and weakness left side  EEG Negative for cva     Syncope and collapse   Had 1 episodes of syncope at [employer] vs [employer] this sat where he passed out for 15mins  - check EEG  - working up for CVA as above     Chest pain  EKG normal with no troponin. Utox positive for amphetamines. Had pressure like chest pain that grows across his chest with SOB today with minimal exertion. - consider Cardiology consult if continues to have cp.  - check troponin. 9/20 -resolved, normal trop, normal echo- no wall motion abnormality     Pre-diabetes : on metformin at home     Depression: continue wellbutrin when able to take PO     Diet: ADULT DIET; Regular; 4 carb choices (60 gm/meal)  VTE ppx: SCDs  Code status: Full Code    Hospital Problems:  Principal Problem:    Acute cerebrovascular accident (CVA) due to ischemia [facility])  Active Problems:    Hypertension    Borderline diabetes    Depression  Resolved Problems:    * No resolved hospital problems.  *      Objective:   Patient Vitals for the past 24 hrs:   Temp Pulse Resp BP SpO2   09/20/22 0754 -- -- -- 125/65 --   09/20/22 0730 -- 76 15 -- 95 %   09/20/22 0715 -- 80 12 115/73 96 %   09/20/22 0700 97.9 °F (36.6 °C) 75 14 -- 95 %   09/20/22 0600 -- 85 18 111/72 99 %   09/20/22 0500 -- 72 11 107/66 97 %   09/20/22 0400 -- 72 13 124/76 98 %   09/20/22 0300 98.5 °F (36.9 °C) 72 16 120/78 98 %   09/20/22 0200 -- 71 14 109/63 98 %   09/20/22 0100 -- 69 22 111/68 98 %   09/20/22 0000 -- 63 14 (!) 106/57 95 %   09/19/22 2300 98 °F (36.7 °C) 72 17 107/65 94 %   09/19/22 2200 -- 92 22 119/71 98 %   09/19/22 2100 -- 75 14 117/71 99 %   09/19/22 2000 -- 76 19 117/84 (!) 87 %   09/19/22 1930 -- 73 12 123/72 95 %   09/19/22 1915 -- 76 16 124/79 100 %   09/19/22 1900 98 °F (36.7 °C) 74 14 111/73 99 %   09/19/22 1845 -- 73 13 111/67 --   09/19/22 1830 -- 73 12 113/73 99 %   09/19/22 1815 -- 72 14 116/75 98 %   09/19/22 1800 -- 74 19 110/72 93 %   09/19/22 1745 -- 78 16 119/69 100 %   09/19/22 1730 -- 80 14 120/72 99 %   09/19/22 1715 -- 98 (!) 37 (!) 147/86 100 %   09/19/22 1700 -- 93 17 (!) 156/80 91 %   09/19/22 1645 -- 94 18 105/67 93 %   09/19/22 1630 -- 69 13 127/80 99 %   09/19/22 1615 -- 66 11 129/79 100 %   09/19/22 1600 -- 72 14 119/74 99 %   09/19/22 1545 -- 71 -- 124/75 100 %   09/19/22 1530 -- 81 (!) 63 137/87 100 %   09/19/22 1515 -- 73 15 122/77 100 %   09/19/22 1500 98.2 °F (36.8 °C) 69 13 112/74 99 %   09/19/22 1445 -- 76 17 114/76 100 %   09/19/22 1430 -- 72 21 116/72 (!) 82 %       Oxygen Therapy  SpO2: 95 %  Pulse Oximetry Type: Continuous  Pulse via Oximetry: 84 beats per minute  Pulse Oximeter Device Mode: Continuous  Pulse Oximeter Device Location: Right, Finger  O2 Device: None (Room air)    Estimated body mass index is 26.11 kg/m² as calculated from the following:    Height as of this encounter: 5' 10\" (1.778 m). Weight as of this encounter: 182 lb (82.6 kg). Intake/Output Summary (Last 24 hours) at 9/20/2022 0906  Last data filed at 9/19/2022 2233  Gross per 24 hour   Intake 120 ml   Output 150 ml   Net -30 ml         Physical Exam:     Blood pressure 125/65, pulse 76, temperature 97.9 °F (36.6 °C), temperature source Oral, resp. rate 15, height 5' 10\" (1.778 m), weight 182 lb (82.6 kg), SpO2 95 %. General:    Well nourished. Head:  Normocephalic, atraumatic  Eyes:  Sclerae appear normal.  Pupils equally round. ENT:  Nares appear normal, no drainage.   Moist oral mucosa  Neck:  No restricted ROM. Trachea midline   CV:   RRR. No m/r/g. No jugular venous distension. Lungs:   CTAB. No wheezing, rhonchi, or rales. Symmetric expansion. Abdomen: Bowel sounds present. Soft, nontender, nondistended. Extremities: No cyanosis or clubbing. No edema  Skin:     No rashes and normal coloration. Warm and dry. Neuro: Though power 5/5 all extremity, mild weakness left side. Normal sensation to light touch  Psych:  Normal mood and affect.       I have personally reviewed labs and tests showing:  Recent Labs:  Recent Results (from the past 48 hour(s))   CBC    Collection Time: 09/19/22  9:44 AM   Result Value Ref Range    WBC 6.9 4.3 - 11.1 K/uL    RBC 4.87 4.23 - 5.60 M/uL    Hemoglobin 16.1 13.6 - 17.2 g/dL    Hematocrit 44.4 41.1 - 50.3 %    MCV 91.2 79.6 - 97.8 FL    MCH 33.1 (H) 26.1 - 32.9 PG    MCHC 36.3 (H) 31.4 - 35.0 g/dL    RDW 12.0 11.9 - 14.6 %    Platelets 731 776 - 964 K/uL    MPV 9.1 (L) 9.4 - 12.3 FL    nRBC 0.00 0.0 - 0.2 K/uL   Comprehensive Metabolic Panel    Collection Time: 09/19/22  9:44 AM   Result Value Ref Range    Sodium 141 138 - 145 mmol/L    Potassium 4.0 3.5 - 5.1 mmol/L    Chloride 101 98 - 107 mmol/L    CO2 28 21 - 32 mmol/L    Anion Gap 12 7.0 - 16.0 mmol/L    Glucose 144 (H) 65 - 100 mg/dL    BUN 19 (H) 7.0 - 18.0 MG/DL    Creatinine 1.15 0.8 - 1.5 MG/DL    GFR African American >87 >60 ml/min/1.73m2    GFR Non- >60 >60 ml/min/1.73m2    Calcium 9.8 8.3 - 10.4 MG/DL    Total Bilirubin 0.5 0.2 - 1.1 MG/DL    ALT 28 13.0 - 61.0 U/L    AST 27 15 - 37 U/L    Alk Phosphatase 73 45.0 - 117.0 U/L    Total Protein 6.9 6.4 - 8.2 g/dL    Albumin 4.3 3.5 - 5.0 g/dL    Globulin 2.6 2.3 - 3.5 g/dL    Albumin/Globulin Ratio 1.7     Troponin T    Collection Time: 09/19/22  9:44 AM   Result Value Ref Range    Troponin T 8.2 0 - 22 ng/L   Magnesium    Collection Time: 09/19/22  9:44 AM   Result Value Ref Range    Magnesium 1.7 1.2 - 2.6 mg/dL   Lipase Collection Time: 09/19/22  9:44 AM   Result Value Ref Range    Lipase 56 13 - 60 U/L   Protime-INR    Collection Time: 09/19/22  9:44 AM   Result Value Ref Range    Protime 13.4 12.6 - 14.5 sec    INR 1.0     POCT Glucose    Collection Time: 09/19/22 10:12 AM   Result Value Ref Range    Glucose 120 mg/dL   POCT Glucose    Collection Time: 09/19/22 10:12 AM   Result Value Ref Range    POC Glucose 120 (H) 65 - 100 mg/dL    Performed by: Robert    Drug Screen Psych, Urine    Collection Time: 09/19/22 10:13 AM   Result Value Ref Range    Benzodiazepines, Urine Negative NEG      Opiates, Urine Negative NEG      Amphetamine, Urine Positive (A) NEG      Cocaine, Urine Negative NEG      THC, TH-Cannabinol, Urine Negative NEG     Troponin T    Collection Time: 09/19/22 11:54 AM   Result Value Ref Range    Troponin T 7.2 0 - 22 ng/L   Hemoglobin A1c    Collection Time: 09/19/22  6:25 PM   Result Value Ref Range    Hemoglobin A1C 5.7 (H) 4.8 - 5.6 %    eAG 117 mg/dL   Troponin    Collection Time: 09/19/22  6:25 PM   Result Value Ref Range    Troponin, High Sensitivity 4.5 0 - 14 pg/mL   Lipid Panel    Collection Time: 09/19/22  6:25 PM   Result Value Ref Range    Cholesterol, Total 172 <200 MG/DL    Triglycerides 232 (H) 35 - 150 MG/DL    HDL 36 (L) 40 - 60 MG/DL    LDL Calculated 89.6 <100 MG/DL    VLDL Cholesterol Calculated 46.4 (H) 6.0 - 23.0 MG/DL    Chol/HDL Ratio 4.8     TSH with Reflex    Collection Time: 09/19/22  6:25 PM   Result Value Ref Range    TSH w Free Thyroid if Abnormal 1.64 0.358 - 3.740 UIU/ML   POCT Glucose    Collection Time: 09/19/22  9:40 PM   Result Value Ref Range    POC Glucose 139 (H) 65 - 100 mg/dL    Performed by: Arely    Basic Metabolic Panel w/ Reflex to MG    Collection Time: 09/20/22  4:12 AM   Result Value Ref Range    Sodium 139 138 - 145 mmol/L    Potassium 3.5 3.5 - 5.1 mmol/L    Chloride 104 101 - 110 mmol/L    CO2 30 21 - 32 mmol/L    Anion Gap 5 4 - 13 mmol/L Glucose 113 (H) 65 - 100 mg/dL    BUN 14 6 - 23 MG/DL    Creatinine 1.10 0.8 - 1.5 MG/DL    GFR African American >60 >60 ml/min/1.73m2    GFR Non- >60 >60 ml/min/1.73m2    Calcium 9.0 8.3 - 10.4 MG/DL   CBC    Collection Time: 09/20/22  4:12 AM   Result Value Ref Range    WBC 8.5 4.3 - 11.1 K/uL    RBC 4.86 4.23 - 5.6 M/uL    Hemoglobin 15.7 13.6 - 17.2 g/dL    Hematocrit 44.9 41.1 - 50.3 %    MCV 92.4 79.6 - 97.8 FL    MCH 32.3 26.1 - 32.9 PG    MCHC 35.0 31.4 - 35.0 g/dL    RDW 12.4 11.9 - 14.6 %    Platelets 843 849 - 426 K/uL    MPV 9.5 9.4 - 12.3 FL    nRBC 0.00 0.0 - 0.2 K/uL   Magnesium    Collection Time: 09/20/22  4:12 AM   Result Value Ref Range    Magnesium 1.9 1.8 - 2.4 mg/dL   POCT Glucose    Collection Time: 09/20/22  6:03 AM   Result Value Ref Range    POC Glucose 122 (H) 65 - 100 mg/dL    Performed by: Arely OZUNA have personally reviewed imaging studies showing: Other Studies:  CT HEAD WO CONTRAST   Final Result   No acute process.       CT HEAD WO CONTRAST    (Results Pending)   MRI BRAIN WO CONTRAST    (Results Pending)       Current Meds:  Current Facility-Administered Medications   Medication Dose Route Frequency    nicotine (NICODERM CQ) 14 MG/24HR 1 patch  1 patch TransDERmal Daily PRN    acetaminophen (TYLENOL) tablet 650 mg  650 mg Oral Q4H PRN    Or    acetaminophen (TYLENOL) suppository 650 mg  650 mg Rectal Q4H PRN    ondansetron (ZOFRAN-ODT) disintegrating tablet 4 mg  4 mg Oral Q8H PRN    Or    ondansetron (ZOFRAN) injection 4 mg  4 mg IntraVENous Q6H PRN    polyethylene glycol (GLYCOLAX) packet 17 g  17 g Oral Daily PRN    bisacodyl (DULCOLAX) suppository 10 mg  10 mg Rectal Daily PRN    aspirin EC tablet 81 mg  81 mg Oral Daily    Or    aspirin suppository 300 mg  300 mg Rectal Daily    atorvastatin (LIPITOR) tablet 80 mg  80 mg Oral Nightly    labetalol (NORMODYNE;TRANDATE) injection 10 mg  10 mg IntraVENous Q10 Min PRN    traMADol (ULTRAM) tablet 50 mg 50 mg Oral Q6H PRN    traZODone (DESYREL) tablet 100 mg  100 mg Oral Nightly    buPROPion (WELLBUTRIN SR) extended release tablet 150 mg  150 mg Oral BID    loperamide (IMODIUM) capsule 2 mg  2 mg Oral 4x Daily PRN       Signed:  Alexandra Heller MD

## 2022-09-20 NOTE — ACP (ADVANCE CARE PLANNING)
Advance Care Planning     General Advance Care Planning (ACP) Conversation    Date of Conversation: 9/20/2022    Healthcare Decision Maker:    Primary Decision Maker: Gretchen Rodriges - Spouse - [phone]  Click here to complete Healthcare Decision Makers including selection of the Healthcare Decision Maker Relationship (ie \"Primary\"). Today we documented Decision Maker(s) consistent with Legal Next of Kin hierarchy. No LW/HCPOA on file currently. Spouse legal NOK. Full code per MD orders.      Length of Voluntary ACP Conversation in minutes:  <16 minutes (Non-Billable)    Ortiz Bansal RN

## 2022-09-20 NOTE — INTERDISCIPLINARY ROUNDS
Multi-D Rounds/Checklist (leapfrog):  Lines: can any be removed?: None       DVT Prophylaxis: Contraindicated  Vent: N/A  Nutrition Ordered/appropriate: Ordered  Bowel Movement last 2 days: No: PTA  Can antibiotics or other drugs be stopped? Is Nozin performed: Yes/End Date set  Consults needed: None  A: Is pain control adequate? (has PRNs? Stop drip?) Yes  B: Sedation break and SBT? N/A  C: Is sedation choice appropriate? N/A  D: Delirium/CAM-ICU? No  E: Mobility goals/appropriateness? Yes  F: Family update and plan? spouse is primary contact and is being updated daily by primary attending and nursing staff.     Paolo Coronado, APRN - CNP

## 2022-09-20 NOTE — CARE COORDINATION
Chart reviewed s/p admission to CCU for CVA. Alert and oriented. Confirms demographics and screen below. Independent of ADL's. Drives self. No DME's, HH, or rehab current. Works full time. PT/OT/ST following. Aware CM to follow for d/c needs/POC. ASSESSMENT NOTE    Attending Physician: Felix Wong MD  Admit Problem: Acute cerebrovascular accident (CVA) due to ischemia [facility]) [I63.9]  Date/Time of Admission: 9/19/2022  2:24 PM  Problem List:  Patient Active Problem List   Diagnosis    Cerebrovascular accident (CVA) ([address]    Hypertension    Borderline diabetes    Depression       Service Assessment  Patient Orientation (P) Alert and Oriented   Cognition (P) Alert   History Provided By [facility]) Patient   Primary Caregiver (P) Self   Accompanied By/Relationship     Support Systems (P) Spouse/Significant Other   Patient's Healthcare Decision Maker is: (P) Legal Next of Kin   PCP Verified by CM (P) Yes   Last Visit to PCP     Prior Functional Level (P) Independent in ADLs/IADLs   Current Functional Level     Can patient return to prior living arrangement (P) Yes   Ability to make needs known: (P) Good   Family able to assist with home care needs:     Would you like for me to discuss the discharge plan with any other family members/significant others, and if so, who?      Financial Resources (P) Other (Comment) (BCBS)   Community Resources     CM/SW Referral       Social/Functional History  Lives With (P) Spouse   Type of Home     Home Layout (P) One level   Home Access (P) Stairs to enter without rails   Entrance Stairs - Number of Steps (P) 4   Entrance Stairs - Rails     Bathroom Shower/Tub (P) Tub/Shower unit   Bathroom Toilet     Bathroom Equipment     Bathroom Accessibility     Home Equipment (P)  (glucemeter)   Receives Help From     ADL Assistance (P) [address]     Feeding     Toileting     [address] [facility]     Other (Comment)     Homemaking Responsibilities     Meal Prep Responsibility     Laundry Responsibility     Cleaning Responsibility     Bill Paying/Finance [address] [facility]     Other (Comment)     Ambulation Assistance (P) Independent   Transfer Assistance     Active  (P) Yes   Patient's  Info     Mode of Transportation     Education     Occupation (P) Full time employment   Type of Occupation       Discharge Planning   [facility] of [address] [facility] (P) Spouse/Significant Other   Current Services Prior To Admission None   Potential Assistance Needed N/A   DME     DME     DME Ordered? No   Potential Assistance Purchasing Medications     Meds-to-Beds: Does the patient want to have any new prescriptions delivered to bedside prior to discharge? Type of Home Care Services None   Patient expects to be discharged to: House   Follow Up Appointment: Best Day/Time     One/Two Story Residence:     # of Interior Steps     Height of Each Step (in)     [employer] Available     History of Falls? Condition of Participation: Discharge Planning  The plan for Transition of Care is related to the following treatment goals: The Patient and/or Patient Representative was provided with a Choice of Provider? Name of the Patient Representative who was provided with the Choice of Provider and agrees with the Discharge Plan? The Patient and/or Patient Representative Agree with the Discharge Plan? Freedom of Choice list was provided with basic dialogue that supports the individualized plan of care/goals, treatment preferences, and shares the quality data associated with the providers?  (P) Yes           Edgard Miranda RN 09/20/22 2:23 PM

## 2022-09-20 NOTE — DISCHARGE INSTRUCTIONS

## 2022-09-20 NOTE — PROGRESS NOTES
Pt complaining of \"feeling funny\" and \"feeling confused\". Answering questions appropriatly  NIH unchanged at 4. States that sensation to left arm, leg and face are a bit worse than earlier and feels a little weaker. Blood sugar- 122. Dr Yang Offer aware of pt complaints. Taken for STAT CT head. Pt tolerated well.

## 2022-09-20 NOTE — PROGRESS NOTES
PHYSICAL THERAPY Initial Assessment and AM  (Link to Caseload Tracking:    Acknowledge Orders  Time In/Out  PT Charge Capture  Rehab Caseload Tracker    Rashaun Mason is a 48 y.o. male   PRIMARY DIAGNOSIS: Cerebrovascular accident (CVA) ([facility] [address]  Acute cerebrovascular accident (CVA) due to ischemia ([facility] [address] [I63.9]       Reason for Referral: Generalized Muscle Weakness (M62.81)  Inpatient: Payor: Meg Daft / Plan: [employer] [place] / Product Type: *No Product type* /     ASSESSMENT:     REHAB RECOMMENDATIONS:   Recommendation to date pending progress:  Setting:  Outpatient Therapy    Equipment:    To Be Determined     ASSESSMENT:  Mr. Mel Gonzales presents to PT with WFL AROM and decreased strength in L LE. Pt was able to come to sitting on EOB with supervision and good sitting balance. He also performed STS transfers today with SBA demonstrating good-fair standing balance. Pt ambulated in santos today with CGA-SBA and decreased gait speed. He also demonstrated increased trunk sway and fatigued quickly. Mr. Mel Gonzales could benefit from skilled PT as he is currently functioning below his baseline.       [facility] [address] [address] AM-PAC 6 Clicks Basic Mobility Inpatient Short Form  -PAC Mobility Inpatient   How much difficulty turning over in bed?: None  How much difficulty sitting down on / standing up from a chair with arms?: None  How much difficulty moving from lying on back to sitting on side of bed?: None  How much help from another person moving to and from a bed to a chair?: None  How much help from another person needed to walk in hospital room?: A Little  How much help from another person for climbing 3-5 steps with a railing?: A Little  -[facility] Inpatient Mobility Raw Score : 22  AM-PAC Inpatient T-Scale Score : 53.28  Mobility Inpatient CMS 0-100% Score: 20.91  Mobility Inpatient CMS G-Code Modifier : CJ    SUBJECTIVE:   Mr. Mel Gonzales states, \"I'm a [place] fan\"     Social/Functional Lives With: Spouse  Home Layout: One level  Home Access: Stairs to enter without rails  Entrance Stairs - Number of Steps: 4  Bathroom Shower/Tub: Tub/Shower unit  Home Equipment:  (glucemeter)  ADL Assistance: Independent  Ambulation Assistance: Independent  Active : Yes  Occupation: Full time employment  Lives with spouse in one story home with 4 steps to enter; independent with ambulation PTA  OBJECTIVE:     PAIN: VITALS / O2: PRECAUTION / Janine Kansky / DRAINS:   Pre Treatment:   Pain Assessment: None - Denies Pain      Post Treatment: 0 Vitals        Oxygen      Continuous Pulse Oximetry and Telemetry     RESTRICTIONS/PRECAUTIONS:                    GROSS EVALUATION: Intact Impaired (Comments):   AROM [x]     PROM []    Strength []  L LE grossly 4-/5   Balance []  Good-fair standing   Posture [] Rounded Shoulders   Sensation []  Decreased L LE   Coordination []   Decreased L LE   Tone []     Edema []    Activity Tolerance []  Fatigued with ambulation    []      COGNITION/  PERCEPTION: Intact Impaired (Comments):   Orientation [x]     Vision [x]     Hearing [x]     Cognition  [x]       MOBILITY: I Mod I S SBA CGA Min Mod Max Total  NT x2 Comments:   Bed Mobility    Rolling [] [] [x] [] [] [] [] [] [] [] []    Supine to Sit [] [] [x] [] [] [] [] [] [] [] []    Scooting [] [] [x] [] [] [] [] [] [] [] []    Sit to Supine [] [] [] [] [] [] [] [] [] [] []    Transfers    Sit to Stand [] [] [] [x] [] [] [] [] [] [] []    Bed to Chair [] [] [] [x] [x] [] [] [] [] [] []    Stand to Sit [] [] [] [x] [x] [] [] [] [] [] []     [] [] [] [] [] [] [] [] [] [] []    I=Independent, Mod I=Modified Independent, S=Supervision, SBA=Standby Assistance, CGA=Contact Guard Assistance,   Min=Minimal Assistance, Mod=Moderate Assistance, Max=Maximal Assistance, Total=Total Assistance, NT=Not Tested    GAIT: I Mod I S SBA CGA Min Mod Max Total  NT x2 Comments:   Level of Assistance [] [] [] [x] [x] [] [] [] [] [] []    Distance 80 feet    DME None    Gait Quality Decreased ty  and Trunk sway increased    Weightbearing Status      Stairs      I=Independent, Mod I=Modified Independent, S=Supervision, SBA=Standby Assistance, CGA=Contact Guard Assistance,   Min=Minimal Assistance, Mod=Moderate Assistance, Max=Maximal Assistance, Total=Total Assistance, NT=Not Tested    PLAN:   ACUTE PHYSICAL THERAPY GOALS:   (Developed with and agreed upon by patient and/or caregiver. )  LTG:  (1.)Mr. Flores will move from supine to sit and sit to supine , scoot up and down, and roll side to side in bed with INDEPENDENCE within 7 treatment day(s). (2.)Mr. Flores will transfer from bed to chair and chair to bed with INDEPENDENCE using the least restrictive device within 7 treatment day(s). (3.)Mr. Flores will ambulate with MODIFIED INDEPENDENCE for 500 feet with the least restrictive device within 7 treatment day(s). (4.)Mr. Flores will participate in therapeutic activity/exercises x 25 minutes for increased activity tolerance within 7 treatment days. (5.)Mr. Flores will ascend and descend 4 stairs using R hand rail(s) with SUPERVISION to improve functional mobility and safety within 7 treatment day(s).     ________________________________________________________________________________________________        FREQUENCY AND DURATION:   for duration of hospital stay or until stated goals are met, whichever comes first.    THERAPY PROGNOSIS: Excellent    PROBLEM LIST:   (Skilled intervention is medically necessary to address:)  Decreased Activity Tolerance  Decreased Balance  Decreased Gait Ability  Decreased Strength INTERVENTIONS PLANNED:   (Benefits and precautions of physical therapy have been discussed with the patient.)  Therapeutic Activity  Therapeutic Exercise/HEP  Neuromuscular Re-education  Gait Training  Education       TREATMENT:   EVALUATION: LOW COMPLEXITY: (Untimed Charge)    TREATMENT:   Co-Treatment PT/OT necessary due to patient's decreased overall endurance/tolerance levels, as well as need for high level skilled assistance to complete functional transfers/mobility and functional tasks  Therapeutic Activity (15 Minutes): Therapeutic activity included Rolling, Supine to Sit, Scooting, Transfer Training, Ambulation on level ground, Sitting balance , and Standing balance to improve functional Activity tolerance, Balance, Mobility, and Strength. TREATMENT GRID:  N/A    AFTER TREATMENT PRECAUTIONS: Bed/Chair Locked, Call light within reach, Chair, and Visitors at bedside    INTERDISCIPLINARY COLLABORATION:  RN/ PCT, PT/ PTA, and OT/ PLATA    EDUCATION: Education Given To: Patient  Education Provided: Role of Therapy    TIME IN/OUT:  Time In: 1019  [facility]  Minutes: Perfecto Diego.  Pam Hu

## 2022-09-20 NOTE — PROCEDURES
EEG REPORT       Patient: Carissa Gonzalez Age: 48 y.o. MRN: [id number]    Date: 9/19/2022  Referring Provider: No ref. provider found    History: This routine 30 minute scalp EEG was recorded with video- monitoring for a 48 y.o.. male  who presented with encephalopathy. This EEG was performed to evaluate for focal and epileptiform abnormalities.      Manjit Flores   Current Facility-Administered Medications   Medication Dose Route Frequency Provider Last Rate Last Admin    LORazepam (ATIVAN) injection 1 mg  1 mg IntraVENous Once Mark Aden MD        nicotine (NICODERM CQ) 14 MG/24HR 1 patch  1 patch TransDERmal Daily PRN Myra Hale MD        acetaminophen (TYLENOL) tablet 650 mg  650 mg Oral Q4H PRN Myra Hale MD   650 mg at 09/19/22 2142    Or    acetaminophen (TYLENOL) suppository 650 mg  650 mg Rectal Q4H PRN Myra Hale MD        ondansetron (ZOFRAN-ODT) disintegrating tablet 4 mg  4 mg Oral Q8H PRN Myra Hale MD        Or    ondansetron (ZOFRAN) injection 4 mg  4 mg IntraVENous Q6H PRN Myra Hale MD        polyethylene glycol (GLYCOLAX) packet 17 g  17 g Oral Daily PRN Myra Hale MD        bisacodyl (DULCOLAX) suppository 10 mg  10 mg Rectal Daily PRN Myra Hale MD        aspirin EC tablet 81 mg  81 mg Oral Daily Myra Hale MD        Or    aspirin suppository 300 mg  300 mg Rectal Daily Myra Hale MD        atorvastatin (LIPITOR) tablet 80 mg  80 mg Oral Nightly Myra Hale MD   80 mg at 09/19/22 2150    labetalol (NORMODYNE;TRANDATE) injection 10 mg  10 mg IntraVENous Q10 Min PRN Myra Hale MD        traMADol (ULTRAM) tablet 50 mg  50 mg Oral Q6H PRN Myra Hale MD        traZODone (DESYREL) tablet 100 mg  100 mg Oral Nightly Myra Hale MD        buPROPion [facility]) extended release tablet 150 mg  150 mg Oral BID Myra Hale MD   150 mg at 09/20/22 0845    loperamide (IMODIUM) capsule 2 mg  2 mg Oral 4x Daily PRN Myra Hale MD   2 mg at 09/20/22 1008        Technical Description: This is a 21 channel digital EEG recording with time-locked video. Electrodes were placed in accordance with the 10-20 International System of Electrode Placement. Single lead EKG monitoring as well as temporal electrodes were included. The patient was not sleep deprived. This recording was obtained during wakefulness. EEG Description: The dominant background activity during maximal recorded wakefulness consisted of bioccipitally dominant 9-10 Hz, 25-35 uV symmetric, regular activity that was reactive to eye opening. During drowsiness, the background rhythm waxed and waned and there were periods of slowing. Photic stimulation - not done  Hyperventilation - was not preformed. No abnormalities were activated by photic stimulation     The EKG channel demonstrated a normal sinus rhythm. Interpretation  This EEG was normal in wakefulness and sleep. Clinical correlation  This EEG was normal. No focal or epileptiform abnormalities were seen.     Phoenix Welch MD  Diplomate, American Board of Psychiatry and Neurology

## 2022-09-20 NOTE — PROGRESS NOTES
Neurology Daily Progress Note     Assessment:     48year old male with stroke symptoms (left sided numbness/weakness) s/p tenecteplase. CTA showed a basilar artery occlusion. Neurointerventionalists at [facility] were contacted by ED physician. 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 Plan:       Repeat CT negative for bleed. Aspirin can be started 24 hours post tPA  Initiate high intensity statin prior to d/c   Neurochecks q1 hour until 24 hours post tPA, then q4 hrs  MRI of brain  Labs: A1c, FLP, TSH, Cardiac enzymes, BMP, CBC  Telemetry and echocardiogram with bubble study  PT/OT/ST  DVT prophylaxis   BP management - 180/105 for first 24 hours, then normotensive   Smoking cessation if applicable   Diabetes education if applicable   Depression Screening prior to discharge     Subjective: Interval history:    Patient doing well. Working with therapy. Reports left sided weakness/numbness is still present, but better than yesterday. History:    Shayne Grimes is a 48 y.o. male who is being seen for stroke symptoms. Past Medical History:   Diagnosis Date    Borderline diabetes     Depression     Hypertension      No past surgical history on file. Family History   Problem Relation Age of Onset    Diabetes Mother     Seizures Father      Social History     Tobacco Use    Smoking status: Never    Smokeless tobacco: Never   Substance Use Topics    Alcohol use:  Yes     Alcohol/week: 12.0 standard drinks     Types: 12 Cans of beer per week      Current Facility-Administered Medications   Medication Dose Route Frequency Provider Last Rate Last Admin    LORazepam (ATIVAN) injection 1 mg  1 mg IntraVENous Once Severo MD Alex        nicotine (NICODERM CQ) 14 MG/24HR 1 patch  1 patch TransDERmal Daily PRN Aileen Higginbotham MD        acetaminophen (TYLENOL) tablet 650 mg  650 mg Oral Q4H PRN Aileen Higginbotham MD   650 mg at 09/19/22 2142    Or    acetaminophen (TYLENOL) suppository 650 mg 650 mg Rectal Q4H PRN Ben Dunbar MD        ondansetron (ZOFRAN-ODT) disintegrating tablet 4 mg  4 mg Oral Q8H PRN Ben Dunbar MD        Or    ondansetron (ZOFRAN) injection 4 mg  4 mg IntraVENous Q6H PRN Ben Dunbar MD        polyethylene glycol (GLYCOLAX) packet 17 g  17 g Oral Daily PRN Ben Dunbar MD        bisacodyl (DULCOLAX) suppository 10 mg  10 mg Rectal Daily PRN Ben Dunbar MD        aspirin EC tablet 81 mg  81 mg Oral Daily Ben Dunbar MD        Or    aspirin suppository 300 mg  300 mg Rectal Daily Ben Dunbar MD        atorvastatin (LIPITOR) tablet 80 mg  80 mg Oral Nightly Ben Dunbar MD   80 mg at 09/19/22 2150    labetalol (NORMODYNE;TRANDATE) injection 10 mg  10 mg IntraVENous Q10 Min PRN Ben Dunbar MD        traMADol (ULTRAM) tablet 50 mg  50 mg Oral Q6H PRN Ben Dunbar MD        traZODone (DESYREL) tablet 100 mg  100 mg Oral Nightly Ben Dunbar MD        buPROPion [facility]) extended release tablet 150 mg  150 mg Oral BID Ben Dunbar MD   150 mg at 09/20/22 0845    loperamide (IMODIUM) capsule 2 mg  2 mg Oral 4x Daily PRN Ben Dunbar MD   2 mg at 09/20/22 1008        Allergies   Allergen Reactions    Cashews [Macadamia Nut Oil] Anaphylaxis    Coconut (Cocos Nucifera) Allergy Skin Test Itching    Penicillins Hives       Review of systems negative with exception of pertinent positives and negatives noted above.        Objective:     Vitals:    09/20/22 0700 09/20/22 0715 09/20/22 0730 09/20/22 0754   BP:  115/73  125/65   Pulse: 75 80 76    Resp: 14 12 15    Temp: 97.9 °F (36.6 °C)      TempSrc: Oral      SpO2: 95% 96% 95%    Weight:       Height:              Current Facility-Administered Medications:     LORazepam (ATIVAN) injection 1 mg, 1 mg, IntraVENous, Once, Cain Alicia MD    nicotine (NICODERM CQ) 14 MG/24HR 1 patch, 1 patch, TransDERmal, Daily PRN, Ben Dunbar MD    acetaminophen (TYLENOL) tablet 650 mg, 650 mg, Oral, Q4H PRN, 650 mg at 09/19/22 2142 **OR** acetaminophen (TYLENOL) suppository 650 mg, 650 mg, Rectal, Q4H PRN, Irina Boykin MD    ondansetron (ZOFRAN-ODT) disintegrating tablet 4 mg, 4 mg, Oral, Q8H PRN **OR** ondansetron (ZOFRAN) injection 4 mg, 4 mg, IntraVENous, Q6H PRN, Irina Boykin MD    polyethylene glycol (GLYCOLAX) packet 17 g, 17 g, Oral, Daily PRN, Irina Boykin MD    bisacodyl (DULCOLAX) suppository 10 mg, 10 mg, Rectal, Daily PRN, Irina Boykin MD    aspirin EC tablet 81 mg, 81 mg, Oral, Daily **OR** aspirin suppository 300 mg, 300 mg, Rectal, Daily, Irina Boykin MD    atorvastatin (LIPITOR) tablet 80 mg, 80 mg, Oral, Nightly, Irina Boykin MD, 80 mg at 09/19/22 2150    labetalol (NORMODYNE;TRANDATE) injection 10 mg, 10 mg, IntraVENous, Q10 Min PRN, Irina Boykin MD    traMADol (ULTRAM) tablet 50 mg, 50 mg, Oral, Q6H PRN, Irina Boykin MD    traZODone (DESYREL) tablet 100 mg, 100 mg, Oral, Nightly, Irina Boykin MD    buPROPion [facility]) extended release tablet 150 mg, 150 mg, Oral, BID, Irina Boykin MD, 150 mg at 09/20/22 0845    loperamide (IMODIUM) capsule 2 mg, 2 mg, Oral, 4x Daily PRN, Irina Boykin MD, 2 mg at 09/20/22 1008    Recent Results (from the past 12 hour(s))   Basic Metabolic Panel w/ Reflex to MG    Collection Time: 09/20/22  4:12 AM   Result Value Ref Range    Sodium 139 138 - 145 mmol/L    Potassium 3.5 3.5 - 5.1 mmol/L    Chloride 104 101 - 110 mmol/L    CO2 30 21 - 32 mmol/L    Anion Gap 5 4 - 13 mmol/L    Glucose 113 (H) 65 - 100 mg/dL    BUN 14 6 - 23 MG/DL    Creatinine 1.10 0.8 - 1.5 MG/DL    GFR African American >60 >60 ml/min/1.73m2    GFR Non- >60 >60 ml/min/1.73m2    Calcium 9.0 8.3 - 10.4 MG/DL   CBC    Collection Time: 09/20/22  4:12 AM   Result Value Ref Range    WBC 8.5 4.3 - 11.1 K/uL    RBC 4.86 4.23 - 5.6 M/uL    Hemoglobin 15.7 13.6 - 17.2 g/dL    Hematocrit 44.9 41.1 - 50.3 %    MCV 92.4 79.6 - 97.8 FL    MCH 32.3 26.1 - 32.9 PG    MCHC 35.0 31.4 - 35.0 g/dL    RDW 12.4 11.9 - 14.6 % Platelets 377 118 - 833 K/uL    MPV 9.5 9.4 - 12.3 FL    nRBC 0.00 0.0 - 0.2 K/uL   Magnesium    Collection Time: 09/20/22  4:12 AM   Result Value Ref Range    Magnesium 1.9 1.8 - 2.4 mg/dL   POCT Glucose    Collection Time: 09/20/22  6:03 AM   Result Value Ref Range    POC Glucose 122 (H) 65 - 100 mg/dL    Performed by: Arely    Transthoracic echocardiogram (TTE) complete with contrast, bubble, strain, and 3D PRN    Collection Time: 09/20/22  9:52 AM   Result Value Ref Range    LV EDV A2C 57 mL    LV EDV A4C 76 mL    LV ESV A2C 28 mL    LV ESV A4C 31 mL    IVSd 1.1 (A) 0.6 - 1.0 cm    LVIDd 4.6 4.2 - 5.9 cm    LVIDs 2.9 cm    LVOT Diameter 2.0 cm    LVOT Mean Gradient 3 mmHg    LVOT VTI 21.4 cm    LVOT Peak Velocity 1.1 m/s    LVOT Peak Gradient 5 mmHg    LVPWd 0.9 0.6 - 1.0 cm    LV E' Lateral Velocity 12 cm/s    LV E' Septal Velocity 7 cm/s    LV Ejection Fraction A2C 51 %    LV Ejection Fraction A4C 59 %    EF BP 56 55 - 100 %    LVOT Area 3.1 cm2    LVOT SV 67.2 ml    LA Minor Axis 5.4 cm    LA Major Axis 5.8 cm    LA Area 2C 14.7 cm2    LA Area 4C 16.4 cm2    LA Volume BP 36 18 - 58 mL    LA Diameter 4.3 cm    AV Mean Velocity 0.9 m/s    AV Mean Gradient 4 mmHg    AV VTI 24.6 cm    AV Peak Velocity 1.3 m/s    AV Peak Gradient 7 mmHg    AV Area by VTI 2.7 cm2    AV Area by Peak Velocity 2.7 cm2    Aortic Root 2.8 cm    Ascending Aorta 2.4 cm    MV E Wave Deceleration Time 230.0 ms    MV A Velocity 0.80 m/s    MV E Velocity 0.81 m/s    PV .0 ms    PV Max Velocity 1.2 m/s    PV Peak Gradient 6 mmHg    RVIDd 3.4 cm    RV Basal Dimension 2.7 cm    TAPSE 1.7 1.7 cm    TR Max Velocity 2.28 m/s    TR Peak Gradient 21 mmHg    Body Surface Area 2.02 m2    Fractional Shortening 2D 37 28 - 44 %    LV ESV Index A4C 15 mL/m2    LV EDV Index A4C 38 mL/m2    LV ESV Index A2C 14 mL/m2    LV EDV Index A2C 28 mL/m2    LVIDd Index 2.29 cm/m2    LVIDs Index 1.44 cm/m2    LV RWT Ratio 0.39     LV Mass 2D 158.8 88 - 224 g    LV Mass 2D Index 79.0 49 - 115 g/m2    MV E/A 1.01     E/E' Ratio (Averaged) 9.16     E/E' Lateral 6.75     E/E' Septal 11.57     LA Volume Index BP 18 16 - 34 ml/m2    LVOT Stroke Volume Index 33.4 mL/m2    LA Size Index 2.14 cm/m2    LA/AO Root Ratio 1.54     Ao Root Index 1.39 cm/m2    Ascending Aorta Index 1.19 cm/m2    AV Velocity Ratio 0.85     LVOT:AV VTI Index 0.87     MEGAN/BSA VTI 1.3 cm2/m2    MEGAN/BSA Peak Velocity 1.3 cm2/m2       CT Results (most recent):  CT reviewed. Results do not populate into note    Most recent MRI   No results found for this or any previous visit. Most recent MRA   No results found for this or any previous visit. Most recent CTA  No results found for this or any previous visit. Most recent Echo  Results for orders placed during the hospital encounter of 09/19/22    Transthoracic echocardiogram (TTE) complete with contrast, bubble, strain, and 3D PRN    Interpretation Summary  Formatting of this result is different from the original.      Left Ventricle: Normal left ventricular systolic function with a visually estimated EF of 55 - 60%. Left ventricle size is normal. Normal wall thickness. Normal wall motion. Normal diastolic function. Interatrial Septum: Agitated saline study was negative with and without provocation. Technical qualifiers: Color flow Doppler was performed and pulse wave and/or continuous wave Doppler was performed. Physical Exam:  General - Well developed, well nourished, in no apparent distress. Pleasant and conversant. HEENT - Normocephalic, atraumatic. Conjunctiva, tympanic membranes, and oropharynx are clear. Neck - Supple without masses  Extremities - Peripheral pulses intact. No edema and no rashes. Neurological examination - Comprehension, attention, memory and reasoning are intact.  Language and speech are normal.   On cranial nerve examination, (II, III, IV, VI) pupils are equal, round, and reactive to light. Visual acuity is adequate. Visual fields are full to finger confrontation. Extraocular motility is normal. (V, VII) Face is symmetric and sensation is impaired to light touch on the left. (VIII) Hearing is intact. (IX, X) Palate and uvula elevate symmetrically. Voice is normal. (XI) Shoulder shrug is strong and equal bilaterally. (XII)Tongue is midline. Motor examination - There is normal muscle tone and bulk. Power is full throughout, with exception of very mild pronation on the left. Muscle stretch reflexes are 1+ throughout. Plantar response is flexor bilaterally. Sensation is intact to light touch in LUE, LLE. Cerebellar examination is normal.     I spent 25 minutes today with the patient, which included chart review, documentation, and greater than 50% of time was spent in direct face-to-face contact, obtaining history, exam, coordinating care, and counseling the patient on medical condition.      Signed By: PEDRO Moss     September 20, 2022

## 2022-09-20 NOTE — PROGRESS NOTES
SPEECH LANGUAGE PATHOLOGY: DYSPHAGIA  Initial Assessment    NAME: Elba Rosa  : 1975  MRN: [id number]    ADMISSION DATE: 2022  PRIMARY DIAGNOSIS: Acute cerebrovascular accident (CVA) due to ischemia [facility])  Acute cerebrovascular accident (CVA) due to ischemia [facility]) [I63.9]    ICD-10: Treatment Diagnosis: R13.12 Dysphagia, Oropharyngeal Phase    RECOMMENDATIONS   Diet:  Diet Solids Recommendation: Regular  Liquid Consistency Recommendation: Thin    Medications: PO     Recommendations:  (Dysphagia treatment is not indicated. Plan for language evaluation at next visit)     Compensatory Swallowing Strategies: Alternate solids and liquids;Upright as possible for all oral intake;Small bites/sips     Therapeutic Intervention:Patient/Family education     Patient continues to require skilled intervention: Yes (for further assessment of language abilities)  D/C Recommendations: Ongoing speech therapy is recommended during this hospitalization       ASSESSMENT    Dysphagia Diagnosis: Swallow function appears WFL  Dysphagia Impression : Patient presents with oropharyngeal swallow function that is within normal limits. No overt s/sx of dysphagia with liquids or solids. Occasional reports of food sticking in mid chest- concerning for esophageal dysphagia. Recommend regular diet and thin liquids. Medications with liquid wash. No dysphagia treatment indicated at this time. Given complaints of food \"sticking\", consider GI consult at next level of care if symptoms persists. No urgent GI need at this time as he reports these symptoms have been present for some time. Mild difficulty with thought organization during interview. He state \"I know what I want to say, but have trouble getting it out\". Speech is fully intelligible at the sentence level. Full assessment of language abilities at next session. GENERAL    Subjective: Patient alert and cooperative.  Wife at bedside  Behavior/Cognition: Alert  Patient Position: upright in bed  Communication Observation: Functional (Occasional hesitation at the sentence level. Increased time for thought organization)  Follows Directions: Simple  Respiratory Status: Room air              History of Present Injury/Illness: Mr. Terrance Jimenez  has a past medical history of Borderline diabetes, Depression, and Hypertension. Roney Christopher He also  has no past surgical history on file. Current Diet : Regular  Current Liquid Diet : Thin    Pain:   Patient does not c/o pain                   Vision: Within Functional Limits            Hearing: Within functional limits         OBJECTIVE    Oral Motor Function:  Patient Positioning: Upright in bed   Oral Motor   Labial: No impairment  Dentition: Upper dentures; Lower dentures  Oral Hygiene: Moist;Clean  Lingual: No impairment  Mandible: No impairment  Dentition: Dentures top;Dentures bottom        Baseline Vocal Quality: Normal    Oropharyngeal Phase:     Assessment Method(s): Palpation;Observation  Patient Position: upright in bed  Vocal Quality: No Impairment  Consistency Presented: Mixed consistency; Regular; Thin  How Presented: Self-fed/presented;Spoon;Straw;Successive Swallows  Bolus Acceptance: No impairment  Bolus Formation/Control: No impairment  Propulsion: No impairment  Oral Residue: None  Initiation of Swallow: No impairment  Laryngeal Elevation: Functional  Pharyngeal Phase Characteristics: No impairment, issues, or problems    Oral Phase - Comment: WNL  Pharyngeal Phase: WNL    PLAN    Duration/Frequency: SLP will follow up for language evaluation if indicated by imaging or course of hospitalization. Frequency/Duration TBD.      Dysphagia Outcome and Severity Scale (MARQUEZ)  Dysphagia Outcome Severity Scale: Level 7: Normal in all situations  Interpretation of Tool: The Dysphagia Outcome and Severity Scale (MARQUEZ) is a simple, easy-to-use, 7-point scale developed to systematically rate the functional severity of dysphagia based on objective assessment and make recommendations for diet level, independence level, and type of nutrition.   Normal(7), Functional(6), Mild(5), Mild-Moderate(4), Moderate(3), Moderate-Severe(2), Severe(1)    Speech Therapy Prognosis  Prognosis: Good  Prognosis Considerations: Previous Level of Function;Participation Level;Medical Diagnosis    Education: Patient, RN  Patient Education: SLP role, plan for language evaluation  Patient Education Response: Verbalizes understanding    PRECAUTIONS/ALLERGIES: Cashews [macadamia nut oil], Coconut (cocos nucifera) allergy skin test, and Penicillins        Therapy Time  SLP Individual Minutes  Time In: 0712  Time Out: 4295  Minutes: 1200 MAN Chapman  9/20/2022 9:28 AM

## 2022-09-21 PROBLEM — R20.0 LEFT SIDED NUMBNESS: Status: ACTIVE | Noted: 2022-09-21

## 2022-09-21 PROBLEM — N30.00 ACUTE CYSTITIS WITHOUT HEMATURIA: Status: ACTIVE | Noted: 2022-09-21

## 2022-09-21 PROBLEM — F10.930 ALCOHOL WITHDRAWAL SYNDROME WITHOUT COMPLICATION (HCC): Status: ACTIVE | Noted: 2022-09-21

## 2022-09-21 LAB
ANION GAP SERPL CALC-SCNC: 10 MMOL/L (ref 4–13)
APPEARANCE UR: ABNORMAL
BACTERIA URNS QL MICRO: ABNORMAL /HPF
BASOPHILS # BLD: 0 K/UL (ref 0–0.2)
BASOPHILS NFR BLD: 0 % (ref 0–2)
BILIRUB UR QL: NEGATIVE
BUN SERPL-MCNC: 12 MG/DL (ref 6–23)
CALCIUM SERPL-MCNC: 8.8 MG/DL (ref 8.3–10.4)
CASTS URNS QL MICRO: ABNORMAL /LPF
CHLORIDE SERPL-SCNC: 103 MMOL/L (ref 101–110)
CO2 SERPL-SCNC: 25 MMOL/L (ref 21–32)
COLOR UR: ABNORMAL
CREAT SERPL-MCNC: 1.2 MG/DL (ref 0.8–1.5)
DIFFERENTIAL METHOD BLD: ABNORMAL
EOSINOPHIL # BLD: 0.1 K/UL (ref 0–0.8)
EOSINOPHIL NFR BLD: 1 % (ref 0.5–7.8)
EPI CELLS #/AREA URNS HPF: ABNORMAL /HPF
ERYTHROCYTE [DISTWIDTH] IN BLOOD BY AUTOMATED COUNT: 11.9 % (ref 11.9–14.6)
GLUCOSE SERPL-MCNC: 156 MG/DL (ref 65–100)
GLUCOSE UR STRIP.AUTO-MCNC: NEGATIVE MG/DL
HCT VFR BLD AUTO: 45.9 % (ref 41.1–50.3)
HGB BLD-MCNC: 16.2 G/DL (ref 13.6–17.2)
HGB UR QL STRIP: ABNORMAL
IMM GRANULOCYTES # BLD AUTO: 0 K/UL (ref 0–0.5)
IMM GRANULOCYTES NFR BLD AUTO: 0 % (ref 0–5)
KETONES UR QL STRIP.AUTO: NEGATIVE MG/DL
LEUKOCYTE ESTERASE UR QL STRIP.AUTO: ABNORMAL
LYMPHOCYTES # BLD: 1.9 K/UL (ref 0.5–4.6)
LYMPHOCYTES NFR BLD: 17 % (ref 13–44)
MAGNESIUM SERPL-MCNC: 2 MG/DL (ref 1.8–2.4)
MCH RBC QN AUTO: 32.5 PG (ref 26.1–32.9)
MCHC RBC AUTO-ENTMCNC: 35.3 G/DL (ref 31.4–35)
MCV RBC AUTO: 92.2 FL (ref 79.6–97.8)
MONOCYTES # BLD: 1.2 K/UL (ref 0.1–1.3)
MONOCYTES NFR BLD: 10 % (ref 4–12)
NEUTS SEG # BLD: 8.1 K/UL (ref 1.7–8.2)
NEUTS SEG NFR BLD: 72 % (ref 43–78)
NITRITE UR QL STRIP.AUTO: POSITIVE
NRBC # BLD: 0 K/UL (ref 0–0.2)
PH UR STRIP: 5.5 (ref 5–9)
PLATELET # BLD AUTO: 253 K/UL (ref 150–450)
PMV BLD AUTO: 9.5 FL (ref 9.4–12.3)
POTASSIUM SERPL-SCNC: 3.5 MMOL/L (ref 3.5–5.1)
PROT UR STRIP-MCNC: 100 MG/DL
RBC # BLD AUTO: 4.98 M/UL (ref 4.23–5.6)
RBC #/AREA URNS HPF: >100 /HPF
SODIUM SERPL-SCNC: 138 MMOL/L (ref 136–145)
SP GR UR REFRACTOMETRY: 1.02 (ref 1–1.02)
UROBILINOGEN UR QL STRIP.AUTO: 1 EU/DL (ref 0.2–1)
WBC # BLD AUTO: 11.3 K/UL (ref 4.3–11.1)
WBC URNS QL MICRO: >100 /HPF

## 2022-09-21 PROCEDURE — 81001 URINALYSIS AUTO W/SCOPE: CPT

## 2022-09-21 PROCEDURE — 2100000000 HC CCU R&B

## 2022-09-21 PROCEDURE — 6370000000 HC RX 637 (ALT 250 FOR IP): Performed by: HOSPITALIST

## 2022-09-21 PROCEDURE — 99232 SBSQ HOSP IP/OBS MODERATE 35: CPT | Performed by: NURSE PRACTITIONER

## 2022-09-21 PROCEDURE — 6370000000 HC RX 637 (ALT 250 FOR IP): Performed by: INTERNAL MEDICINE

## 2022-09-21 PROCEDURE — 80048 BASIC METABOLIC PNL TOTAL CA: CPT

## 2022-09-21 PROCEDURE — 87088 URINE BACTERIA CULTURE: CPT

## 2022-09-21 PROCEDURE — 6360000002 HC RX W HCPCS: Performed by: HOSPITALIST

## 2022-09-21 PROCEDURE — 36415 COLL VENOUS BLD VENIPUNCTURE: CPT

## 2022-09-21 PROCEDURE — 83735 ASSAY OF MAGNESIUM: CPT

## 2022-09-21 PROCEDURE — 85025 COMPLETE CBC W/AUTO DIFF WBC: CPT

## 2022-09-21 PROCEDURE — 92523 SPEECH SOUND LANG COMPREHEN: CPT

## 2022-09-21 PROCEDURE — 87086 URINE CULTURE/COLONY COUNT: CPT

## 2022-09-21 PROCEDURE — 87186 SC STD MICRODIL/AGAR DIL: CPT

## 2022-09-21 RX ORDER — SACCHAROMYCES BOULARDII 250 MG
250 CAPSULE ORAL 2 TIMES DAILY
Status: DISCONTINUED | OUTPATIENT
Start: 2022-09-21 | End: 2022-09-22 | Stop reason: HOSPADM

## 2022-09-21 RX ORDER — CHLORDIAZEPOXIDE HYDROCHLORIDE 10 MG/1
10 CAPSULE, GELATIN COATED ORAL 3 TIMES DAILY
Status: DISCONTINUED | OUTPATIENT
Start: 2022-09-21 | End: 2022-09-22 | Stop reason: HOSPADM

## 2022-09-21 RX ORDER — CIPROFLOXACIN 250 MG/1
500 TABLET, FILM COATED ORAL EVERY 12 HOURS SCHEDULED
Status: DISCONTINUED | OUTPATIENT
Start: 2022-09-21 | End: 2022-09-22 | Stop reason: HOSPADM

## 2022-09-21 RX ADMIN — TRAZODONE HYDROCHLORIDE 100 MG: 50 TABLET ORAL at 20:52

## 2022-09-21 RX ADMIN — LORAZEPAM 1 MG: 1 TABLET ORAL at 03:46

## 2022-09-21 RX ADMIN — ATORVASTATIN CALCIUM 80 MG: 80 TABLET, FILM COATED ORAL at 20:52

## 2022-09-21 RX ADMIN — CIPROFLOXACIN 500 MG: 250 TABLET, FILM COATED ORAL at 20:52

## 2022-09-21 RX ADMIN — CHLORDIAZEPOXIDE HYDROCHLORIDE 10 MG: 10 CAPSULE ORAL at 13:00

## 2022-09-21 RX ADMIN — BUPROPION HYDROCHLORIDE 150 MG: 150 TABLET, EXTENDED RELEASE ORAL at 08:21

## 2022-09-21 RX ADMIN — LORAZEPAM 1 MG: 2 INJECTION INTRAMUSCULAR; INTRAVENOUS at 23:20

## 2022-09-21 RX ADMIN — ACETAMINOPHEN 650 MG: 325 TABLET, FILM COATED ORAL at 03:45

## 2022-09-21 RX ADMIN — BUPROPION HYDROCHLORIDE 150 MG: 150 TABLET, EXTENDED RELEASE ORAL at 20:52

## 2022-09-21 RX ADMIN — ASPIRIN 81 MG: 81 TABLET ORAL at 08:21

## 2022-09-21 RX ADMIN — LORAZEPAM 2 MG: 1 TABLET ORAL at 22:18

## 2022-09-21 RX ADMIN — Medication 250 MG: at 12:00

## 2022-09-21 RX ADMIN — CHLORDIAZEPOXIDE HYDROCHLORIDE 10 MG: 10 CAPSULE ORAL at 09:00

## 2022-09-21 RX ADMIN — Medication 100 MG: at 08:21

## 2022-09-21 RX ADMIN — CHLORDIAZEPOXIDE HYDROCHLORIDE 10 MG: 10 CAPSULE ORAL at 20:52

## 2022-09-21 RX ADMIN — Medication 250 MG: at 20:52

## 2022-09-21 ASSESSMENT — PAIN SCALES - GENERAL
PAINLEVEL_OUTOF10: 0
PAINLEVEL_OUTOF10: 1
PAINLEVEL_OUTOF10: 3
PAINLEVEL_OUTOF10: 0
PAINLEVEL_OUTOF10: 0

## 2022-09-21 ASSESSMENT — PAIN DESCRIPTION - LOCATION
LOCATION: HEAD
LOCATION: HEAD

## 2022-09-21 ASSESSMENT — PAIN DESCRIPTION - DESCRIPTORS: DESCRIPTORS: THROBBING

## 2022-09-21 NOTE — PROGRESS NOTES
End of Shift Note    Vitals:    09/21/22 0300 09/21/22 0400 09/21/22 0500 09/21/22 0600   BP: 118/71 120/82 103/65 105/66   Pulse: 89 91 74 73   Resp: 20 17 17 16   Temp:  98.5 °F (36.9 °C)     TempSrc:  Oral     SpO2: 91% 93% 91% 92%   Weight:       Height:          Lab Results   Component Value Date/Time     09/21/2022 03:25 AM    K 3.5 09/21/2022 03:25 AM     09/21/2022 03:25 AM    CO2 25 09/21/2022 03:25 AM    BUN 12 09/21/2022 03:25 AM    CREATININE 1.20 09/21/2022 03:25 AM    GLUCOSE 156 09/21/2022 03:25 AM    CALCIUM 8.8 09/21/2022 03:25 AM      Lab Results   Component Value Date    WBC 11.3 (H) 09/21/2022    HGB 16.2 09/21/2022    HCT 45.9 09/21/2022    MCV 92.2 09/21/2022     09/21/2022     I/O this shift:  In: -   Out: 500 [Urine:500] + unmeasured    Shift Summary: Patient remains on neuro checks q4hrs. NIHSS: 2. Patient ambulatory with a steady gait and without requiring assistance. MRI completed early in the shift, with no significant findings noted. CIWA protocol initiated (last drink: just prior to hospital admission) and patient did begin exhibiting signs of withdrawal (headache, anxiety, muscle tremors) - PRN ativan administered per protocol with no adverse effects. Patient has remained in Sinus Rhythm / Sinus Tach.

## 2022-09-21 NOTE — INTERDISCIPLINARY ROUNDS
Multi-D Rounds/Checklist (leapfrog):  Lines: can any be removed?: None       DVT Prophylaxis: Contraindicated  Vent: N/A  Nutrition Ordered/appropriate: Ordered  Can antibiotics or other drugs be stopped? Is Nozin performed: Per Primary Team  Consults needed: None  A: Is pain control adequate? (has PRNs? Stop drip?) Yes  B: Sedation break and SBT? N/A  C: Is sedation choice appropriate? N/A  D: Delirium/CAM-ICU? No  E: Mobility goals/appropriateness? Yes  F: Family update and plan? spouse is primary contact and is being updated daily by primary attending and nursing staff.     Carissa Chowdary, APRN - CNP

## 2022-09-21 NOTE — PROGRESS NOTES
SPEECH LANGUAGE PATHOLOGY: COMMUNICATION Initial Assessment and Discharge    MRN: [id number]    ADMISSION DATE: 9/19/2022  PRIMARY DIAGNOSIS: Cerebrovascular accident (CVA) ([facility] [address]  Acute cerebrovascular accident (CVA) due to ischemia [facility]) [I63.9]    ICD-10: Treatment Diagnosis: R41.841 Cognitive-Communication Deficit    RECOMMENDATIONS:   Recommendations:    No additional speech therapy indicated during this admission as speech, language and cognitive abilities are functional.     Consider Outpatient speech therapy evaluation upon discharge for assessment of higher level abilities given his concerns regarding return to work. Patient continues to require skilled intervention:  (No additional speech therapy at this level of care. Outpatient speech therapy evaluation upon discharge)       ASSESSMENT      Impressions  Diagnosis: Patient presents with functional cognitive-linguistic abilities as assessed this date. Only errors noted during evaluation were related to impulsivity with mental math calucations. Expressive and receptive language are within normal limits with no apparent signs of difficulty identified in evaluation. With written communication, one error noted which he independently identified and self-corrected. He expresses some concern with returning to work due to the detailed nature of his job. Recommend further assessment of cognitive-linguistic abilities as outpatient as that setting is better suited for identifying subtle changes that may impact work performance. No acute speech therapy needs as language and cognitive abilities are functional.    GENERAL   Subjective: Patient pleasant and cooperative with evaluation. Reports language is improved, but occasionally needs increased time to \"think\".   Communication Observation: Functional  Follows Directions: Simple  Patient Positioning: Upright in bed     Respiratory Status: Room air                    Pain:   Patient does not c/o pain Vision: Within Functional Limits            Hearing: Within functional limits            OBJECTIVE   SLUMS and other measures completed to evaluate cognitive linguistic functioning: Total score: 27/30  Orientation-3/3  Recall(5 words)- immediate: ; delayed:  Problem solvin/3  Divergent naming(concrete category): 3/3- 21 items in 1 minute  Digit manipulation:   Visuospatial: 2  Clock drawin/4  Immediate recall (passage)-  Patient with score of 27/30 which indicated normal cognitive-linguistic function. Math calculation errors related to impulsivity and were corrected with minimal cues. Portions of Communication/Cognitive Brief Evaluation also administered. - Phrase completion 100%  - Sentence formation 100%  - Simple reasoning 100%. He described and corrected errors in a given situation with appropriate word finding, thought organization and sentence structure. - Problem solving/judgement 100%/   - Written language: When asked to write sentence he provided \"I love work on cars\", but immediately identified and corrected error. PLAN    Duration/Frequency: No treatment indicated at this time    Speech Therapy Prognosis  Prognosis: Excellent  Prognosis Considerations: Participation Level, Previous Level of Function, Severity of Impairments    MODIFIED JAXSON SCALE (mRS) SCORE: 0  Interpretation of Tool: The Modified Jaxson Scale is a scale used to quantify level of disability as it relates to a patient's functional abilities. No Symptoms(0); No significant disability despite symptoms; able to carry out all usual duties and activities(1); Slight disability; unable to carry out all previous activities but able to look after own affairs without assistance(2); Moderate disability; requiring some help but able to walk without assistance(3); Moderately severe disability; unable to walk without assistance and unable to attend to own bodily needs without assistance(4);  Severe disability; bedridden, incontinent, and requiring constant nursing care and attention(5)      Education: Patient, RN  Patient Education: SLP role, plan for language evaluation  Patient Education Response: Verbalizes understanding    PRECAUTIONS/ALLERGIES: Cashews [macadamia nut oil], Coconut (cocos nucifera) allergy skin test, and Penicillins     Safety Devices in place: Yes  Type of devices: Left in bed, Call light within reach    Therapy Time  SLP Individual Minutes  Time In: 1007  Time Out: [facility] 0780  Minutes: 511 MAN Benson  9/21/2022 10:46 AM

## 2022-09-21 NOTE — PROGRESS NOTES
Hospitalist Progress Note   Admit Date:  2022  2:24 PM   Name:  Elba Rosa   Age:  48 y.o. Sex:  male  :  1975   MRN:  [id number]   Room:  [address] [place] [address]    Presenting Complaint: No chief complaint on file. Reason(s) for Admission: Acute cerebrovascular accident (CVA) due to ischemia [facility]) [I63.9]     Hospital Course:   Elba Rosa is a 48 y.o. male with medical history of prediabetes, hypertension, depression who presented with chest pain and left-sided weakness. Patient reports having his first episode of left-sided, pressure-like chest pain on Saturday while attending [facility] versus [employer] football game. Reports getting very hot and subsequently passed out for 15 minutes. He experienced another episode of chest pain while at work. Reports shortness of breath with walking short distance this morning as well. Reports left-sided numbness, tingling. Symptoms started around 7:45AM.   Saw his in house PCP at work who sent him to ED. CODE S was called in the ED with NIHSS of 2. Telenuro recommended tPA and he received it at 1058. 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  discussed Dr. Dara Gomez at [facility]. She as well as neuro interventionalists looked over CTA and noted no occlusion or thrombus. States that there is no intervention that they would perform at this time. States the patient can remain at [facility] and does not need to be transferred to [facility]. Laboratory work-up is unremarkable. Urine toxicology is positive for amphetamines. Chest x-ray with low lung volumes with bibasilar atelectasis. EKG with normal sinus rhythm without ST or T wave abnormalities. Subjective & 24hr Events (22): At bedside, resting in bed comfortably. He is alert and awake, reports feeling better. He does report of having an episode of feeling anxious, restless and tremulous and sweaty last night.   He endorsed drinking 3 to 4 cans of beer every day with his last drink being on Friday. Patient reported feeling better after getting Ativan last night. Denies any chest pain, nausea vomiting, fever chills, palpitations, shortness of breath. Does report of mild persistent left-sided numbness. ROS:  GI point review of system is negative except for what mentioned above. Assessment & Plan:   Acute Ischemic CVA s/p tPA  Presented with left sided weakness with NIHSS of 2 on admission. Teleneuro evaluated and recommended tPA @ 1058. CT Head without acute abnormalities. 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  discussed Dr. Germain Treadwell at [facility]. She as well as neuro interventionalists looked over CTA and noted no occlusion or thrombus. Stated that there is no intervention that they would perform at this time. Patient was not transferred to [facility]. 9/21-MRI brain with negative for acute intracranial abnormality. Improving numbness and weakness left side  EEG Negative for cva  Continue aspirin Lipitor. Echo with normal EF 55 to 60%, no shunt appreciated. Alcohol withdrawal syndrome without complication  Plan: 9/52-  On Librium 10 mg p.o. 3 times daily. As needed Ativan for agitation restlessness. Counseled for alcohol cessation. Acute cystitis without hematuria  Plan: 9/21-  Urinalysis positive for 4+bacteriuria  Started on ciprofloxacin 500 mg p.o. twice daily x7 days. Follow-up with urine culture     Syncope and collapse   Had 1 episodes of syncope at [facility] vs [facility] this sat where he passed out for 15mins  -EEG normal  - working up for CVA as above     Chest pain  EKG normal with no troponin. Utox positive for amphetamines. Had pressure like chest pain that grows across his chest with SOB today with minimal exertion. - consider Cardiology consult if continues to have cp.  - check troponin.   9/20 -resolved, normal trop, normal echo- no wall motion abnormality     Pre-diabetes : on metformin at home     Depression: continue wellbutrin when able to take PO     Diet: ADULT DIET; Regular; 4 carb choices (60 gm/meal)  VTE ppx: SCDs  Code status: Full Code    I spent 36 minutes of time caring for this patient on the unit nearby or at bedside, and more than 50 percent was spent on coordination of care activities, and/or patient/family counseling regarding status and plan of care. Disposition:  Patient can be transferred out of ICU for regular neurology floor. Potential discharge home in next 1 to 2 days. Hospital Problems:  Principal Problem:    Cerebrovascular accident (CVA) ([facility] [address]  Active Problems:    Hypertension    Borderline diabetes    Depression    Alcohol withdrawal syndrome without complication ([facility] [address]    Left sided numbness    Acute cystitis without hematuria  Resolved Problems:    * No resolved hospital problems.  *      Objective:   Patient Vitals for the past 24 hrs:   Temp Pulse Resp BP SpO2   09/21/22 0715 -- 72 12 -- 94 %   09/21/22 0702 -- 68 17 97/63 93 %   09/21/22 0700 97.9 °F (36.6 °C) 68 15 -- 94 %   09/21/22 0600 -- 73 16 105/66 92 %   09/21/22 0500 -- 74 17 103/65 91 %   09/21/22 0400 98.5 °F (36.9 °C) 91 17 120/82 93 %   09/21/22 0300 -- 89 20 118/71 91 %   09/21/22 0200 -- (!) 101 17 123/75 93 %   09/21/22 0106 -- 94 20 112/75 95 %   09/21/22 0000 98 °F (36.7 °C) 100 19 (!) 121/93 95 %   09/20/22 2300 -- 99 20 124/80 96 %   09/20/22 2200 -- (!) 118 26 121/84 92 %   09/20/22 2113 98.3 °F (36.8 °C) (!) 111 23 117/80 (!) 85 %   09/20/22 1900 98.3 °F (36.8 °C) (!) 117 (!) 34 (!) 144/94 92 %   09/20/22 1800 -- (!) 114 (!) 40 137/86 96 %   09/20/22 1745 -- 98 17 -- 95 %   09/20/22 1730 -- (!) 104 15 -- 100 %   09/20/22 1715 -- (!) 103 11 -- 97 %   09/20/22 1700 -- 100 16 125/80 98 %   09/20/22 1630 -- 98 21 -- 98 %   09/20/22 1615 -- 100 17 -- 98 %   09/20/22 1600 97.9 °F (36.6 °C) (!) 107 (!) 32 122/80 99 %   09/20/22 1545 -- 93 15 -- 98 %   09/20/22 1530 -- 89 13 -- 100 %   09/20/22 1515 -- 85 17 -- 98 %   09/20/22 1500 -- 81 14 115/74 97 %   09/20/22 1445 -- 92 16 -- 96 %   09/20/22 1430 -- 90 16 -- 96 %   09/20/22 1415 -- 93 16 -- 97 %   09/20/22 1400 -- 90 20 117/79 97 %   09/20/22 1345 -- (!) 102 (!) 34 -- (!) 83 %   09/20/22 1330 -- 89 17 -- 95 %   09/20/22 1315 -- 84 20 -- 98 %   09/20/22 1300 -- 98 (!) 31 (!) 141/84 90 %   09/20/22 1245 -- 92 (!) 46 -- 95 %   09/20/22 1230 -- 79 14 -- 90 %   09/20/22 1215 -- 76 19 -- 99 %   09/20/22 1200 98 °F (36.7 °C) 88 16 107/69 98 %   09/20/22 1145 -- 85 12 -- 100 %   09/20/22 1130 -- 88 18 -- 95 %   09/20/22 1115 -- 90 15 -- 96 %   09/20/22 1100 -- 94 -- 109/73 97 %   09/20/22 1045 -- 95 16 -- 96 %   09/20/22 1015 -- 92 -- -- 98 %   09/20/22 1000 -- 83 13 115/72 97 %   09/20/22 0945 -- 85 14 -- 97 %   09/20/22 0930 -- 85 15 -- 96 %   09/20/22 0915 -- 100 (!) 31 -- (!) 84 %   09/20/22 0900 -- 97 -- 124/77 96 %   09/20/22 0845 -- 90 15 -- 97 %   09/20/22 0830 -- 82 15 -- 97 %   09/20/22 0815 -- 75 16 -- 96 %   09/20/22 0800 -- 78 18 114/77 96 %   09/20/22 0754 -- -- -- 125/65 --   09/20/22 0745 -- 86 14 -- 98 %         Oxygen Therapy  SpO2: 94 %  Pulse Oximetry Type: Continuous  Pulse via Oximetry: 69 beats per minute  Pulse Oximeter Device Mode: Continuous  Pulse Oximeter Device Location: Finger, Right  O2 Device: None (Room air)  Skin Assessment: Clean, dry, & intact  Oxygen Therapy: None (Room air)    Estimated body mass index is 26.11 kg/m² as calculated from the following:    Height as of this encounter: 5' 10\" (1.778 m). Weight as of this encounter: 182 lb (82.6 kg). Intake/Output Summary (Last 24 hours) at 9/21/2022 0735  Last data filed at 9/21/2022 0700  Gross per 24 hour   Intake 150 ml   Output 775 ml   Net -625 ml           Physical Exam:     Blood pressure 97/63, pulse 72, temperature 97.9 °F (36.6 °C), temperature source Oral, resp.  rate 12, height 5' 10\" (1.778 m), weight 182 lb (82.6 kg), SpO2 94 %. General:    Alert, awake, NAD, on room air  HEENT:          Head NCAT, PERRLA positive, MMM  Neck:  No restricted ROM. Trachea midline   CV:   RRR. No m/r/g. No jugular venous distension. Lungs:   CTAB. No wheezing, rhonchi, or rales. Symmetric expansion. Abdomen: Bowel sounds present. Soft, nontender, nondistended. Extremities: No cyanosis or clubbing. No edema  Skin:     No rashes and normal coloration. Warm and dry.     Neuro:  GCS 15, cranial nerves intact, no motor deficits, mild sensory loss in left lower extremity, cerebellar functions intact, speech is normal  Psych:  AOx3, mood and affect appropriate    I have personally reviewed labs and tests showing:  Recent Labs:  Recent Results (from the past 48 hour(s))   CBC    Collection Time: 09/19/22  9:44 AM   Result Value Ref Range    WBC 6.9 4.3 - 11.1 K/uL    RBC 4.87 4.23 - 5.60 M/uL    Hemoglobin 16.1 13.6 - 17.2 g/dL    Hematocrit 44.4 41.1 - 50.3 %    MCV 91.2 79.6 - 97.8 FL    MCH 33.1 (H) 26.1 - 32.9 PG    MCHC 36.3 (H) 31.4 - 35.0 g/dL    RDW 12.0 11.9 - 14.6 %    Platelets 243 409 - 939 K/uL    MPV 9.1 (L) 9.4 - 12.3 FL    nRBC 0.00 0.0 - 0.2 K/uL   Comprehensive Metabolic Panel    Collection Time: 09/19/22  9:44 AM   Result Value Ref Range    Sodium 141 138 - 145 mmol/L    Potassium 4.0 3.5 - 5.1 mmol/L    Chloride 101 98 - 107 mmol/L    CO2 28 21 - 32 mmol/L    Anion Gap 12 7.0 - 16.0 mmol/L    Glucose 144 (H) 65 - 100 mg/dL    BUN 19 (H) 7.0 - 18.0 MG/DL    Creatinine 1.15 0.8 - 1.5 MG/DL    GFR African American >87 >60 ml/min/1.73m2    GFR Non- >60 >60 ml/min/1.73m2    Calcium 9.8 8.3 - 10.4 MG/DL    Total Bilirubin 0.5 0.2 - 1.1 MG/DL    ALT 28 13.0 - 61.0 U/L    AST 27 15 - 37 U/L    Alk Phosphatase 73 45.0 - 117.0 U/L    Total Protein 6.9 6.4 - 8.2 g/dL    Albumin 4.3 3.5 - 5.0 g/dL    Globulin 2.6 2.3 - 3.5 g/dL    Albumin/Globulin Ratio 1.7     Troponin T    Collection Time: 09/19/22  9:44 AM   Result Value Ref Range    Troponin T 8.2 0 - 22 ng/L   Magnesium    Collection Time: 09/19/22  9:44 AM   Result Value Ref Range    Magnesium 1.7 1.2 - 2.6 mg/dL   Lipase    Collection Time: 09/19/22  9:44 AM   Result Value Ref Range    Lipase 56 13 - 60 U/L   Protime-INR    Collection Time: 09/19/22  9:44 AM   Result Value Ref Range    Protime 13.4 12.6 - 14.5 sec    INR 1.0     POCT Glucose    Collection Time: 09/19/22 10:12 AM   Result Value Ref Range    Glucose 120 mg/dL   POCT Glucose    Collection Time: 09/19/22 10:12 AM   Result Value Ref Range    POC Glucose 120 (H) 65 - 100 mg/dL    Performed by: Robert    Drug Screen Psych, Urine    Collection Time: 09/19/22 10:13 AM   Result Value Ref Range    Benzodiazepines, Urine Negative NEG      Opiates, Urine Negative NEG      Amphetamine, Urine Positive (A) NEG      Cocaine, Urine Negative NEG      THC, TH-Cannabinol, Urine Negative NEG     Troponin T    Collection Time: 09/19/22 11:54 AM   Result Value Ref Range    Troponin T 7.2 0 - 22 ng/L   Hemoglobin A1c    Collection Time: 09/19/22  6:25 PM   Result Value Ref Range    Hemoglobin A1C 5.7 (H) 4.8 - 5.6 %    eAG 117 mg/dL   Troponin    Collection Time: 09/19/22  6:25 PM   Result Value Ref Range    Troponin, High Sensitivity 4.5 0 - 14 pg/mL   Lipid Panel    Collection Time: 09/19/22  6:25 PM   Result Value Ref Range    Cholesterol, Total 172 <200 MG/DL    Triglycerides 232 (H) 35 - 150 MG/DL    HDL 36 (L) 40 - 60 MG/DL    LDL Calculated 89.6 <100 MG/DL    VLDL Cholesterol Calculated 46.4 (H) 6.0 - 23.0 MG/DL    Chol/HDL Ratio 4.8     TSH with Reflex    Collection Time: 09/19/22  6:25 PM   Result Value Ref Range    TSH w Free Thyroid if Abnormal 1.64 0.358 - 3.740 UIU/ML   POCT Glucose    Collection Time: 09/19/22  9:40 PM   Result Value Ref Range    POC Glucose 139 (H) 65 - 100 mg/dL    Performed by: Arely    Basic Metabolic Panel w/ Reflex to MG    Collection Time: 09/20/22  4:12 AM   Result Value Ref Range    Sodium 139 138 - 145 mmol/L    Potassium 3.5 3.5 - 5.1 mmol/L    Chloride 104 101 - 110 mmol/L    CO2 30 21 - 32 mmol/L    Anion Gap 5 4 - 13 mmol/L    Glucose 113 (H) 65 - 100 mg/dL    BUN 14 6 - 23 MG/DL    Creatinine 1.10 0.8 - 1.5 MG/DL    GFR African American >60 >60 ml/min/1.73m2    GFR Non- >60 >60 ml/min/1.73m2    Calcium 9.0 8.3 - 10.4 MG/DL   CBC    Collection Time: 09/20/22  4:12 AM   Result Value Ref Range    WBC 8.5 4.3 - 11.1 K/uL    RBC 4.86 4.23 - 5.6 M/uL    Hemoglobin 15.7 13.6 - 17.2 g/dL    Hematocrit 44.9 41.1 - 50.3 %    MCV 92.4 79.6 - 97.8 FL    MCH 32.3 26.1 - 32.9 PG    MCHC 35.0 31.4 - 35.0 g/dL    RDW 12.4 11.9 - 14.6 %    Platelets 933 796 - 829 K/uL    MPV 9.5 9.4 - 12.3 FL    nRBC 0.00 0.0 - 0.2 K/uL   Magnesium    Collection Time: 09/20/22  4:12 AM   Result Value Ref Range    Magnesium 1.9 1.8 - 2.4 mg/dL   POCT Glucose    Collection Time: 09/20/22  6:03 AM   Result Value Ref Range    POC Glucose 122 (H) 65 - 100 mg/dL    Performed by: Arely    Transthoracic echocardiogram (TTE) complete with contrast, bubble, strain, and 3D PRN    Collection Time: 09/20/22  9:52 AM   Result Value Ref Range    LV EDV A2C 57 mL    LV EDV A4C 76 mL    LV ESV A2C 28 mL    LV ESV A4C 31 mL    IVSd 1.1 (A) 0.6 - 1.0 cm    LVIDd 4.6 4.2 - 5.9 cm    LVIDs 2.9 cm    LVOT Diameter 2.0 cm    LVOT Mean Gradient 3 mmHg    LVOT VTI 21.4 cm    LVOT Peak Velocity 1.1 m/s    LVOT Peak Gradient 5 mmHg    LVPWd 0.9 0.6 - 1.0 cm    LV E' Lateral Velocity 12 cm/s    LV E' Septal Velocity 7 cm/s    LV Ejection Fraction A2C 51 %    LV Ejection Fraction A4C 59 %    EF BP 56 55 - 100 %    LVOT Area 3.1 cm2    LVOT SV 67.2 ml    LA Minor Axis 5.4 cm    LA Major Axis 5.8 cm    LA Area 2C 14.7 cm2    LA Area 4C 16.4 cm2    LA Volume BP 36 18 - 58 mL    LA Diameter 4.3 cm    AV Mean Velocity 0.9 m/s    AV Mean Gradient 4 mmHg    AV VTI 24.6 cm    AV Peak Velocity 1.3 m/s    AV Peak Gradient 7 mmHg    AV Area by VTI 2.7 cm2    AV Area by Peak Velocity 2.7 cm2    Aortic Root 2.8 cm    Ascending Aorta 2.4 cm    MV E Wave Deceleration Time 230.0 ms    MV A Velocity 0.80 m/s    MV E Velocity 0.81 m/s    PV .0 ms    PV Max Velocity 1.2 m/s    PV Peak Gradient 6 mmHg    RVIDd 3.4 cm    RV Basal Dimension 2.7 cm    TAPSE 1.7 1.7 cm    TR Max Velocity 2.28 m/s    TR Peak Gradient 21 mmHg    Body Surface Area 2.02 m2    Fractional Shortening 2D 37 28 - 44 %    LV ESV Index A4C 15 mL/m2    LV EDV Index A4C 38 mL/m2    LV ESV Index A2C 14 mL/m2    LV EDV Index A2C 28 mL/m2    LVIDd Index 2.29 cm/m2    LVIDs Index 1.44 cm/m2    LV RWT Ratio 0.39     LV Mass 2D 158.8 88 - 224 g    LV Mass 2D Index 79.0 49 - 115 g/m2    MV E/A 1.01     E/E' Ratio (Averaged) 9.16     E/E' Lateral 6.75     E/E' Septal 11.57     LA Volume Index BP 18 16 - 34 ml/m2    LVOT Stroke Volume Index 33.4 mL/m2    LA Size Index 2.14 cm/m2    LA/AO Root Ratio 1.54     Ao Root Index 1.39 cm/m2    Ascending Aorta Index 1.19 cm/m2    AV Velocity Ratio 0.85     LVOT:AV VTI Index 0.87     MEGAN/BSA VTI 1.3 cm2/m2    MEGAN/BSA Peak Velocity 1.3 TX0/B8   Basic Metabolic Panel w/ Reflex to MG    Collection Time: 09/21/22  3:25 AM   Result Value Ref Range    Sodium 138 136 - 145 mmol/L    Potassium 3.5 3.5 - 5.1 mmol/L    Chloride 103 101 - 110 mmol/L    CO2 25 21 - 32 mmol/L    Anion Gap 10 4 - 13 mmol/L    Glucose 156 (H) 65 - 100 mg/dL    BUN 12 6 - 23 MG/DL    Creatinine 1.20 0.8 - 1.5 MG/DL    GFR African American >60 >60 ml/min/1.73m2    GFR Non- >60 >60 ml/min/1.73m2    Calcium 8.8 8.3 - 10.4 MG/DL   CBC with Auto Differential    Collection Time: 09/21/22  3:25 AM   Result Value Ref Range    WBC 11.3 (H) 4.3 - 11.1 K/uL    RBC 4.98 4.23 - 5.6 M/uL    Hemoglobin 16.2 13.6 - 17.2 g/dL    Hematocrit 45.9 41.1 - 50.3 %    MCV 92.2 79.6 - 97.8 FL    MCH 32.5 26.1 - 32.9 PG    MCHC 35.3 (H) 31.4 - 35.0 g/dL    RDW 11.9 11.9 - 14.6 %    Platelets 809 047 - 940 K/uL    MPV 9.5 9.4 - 12.3 FL    nRBC 0.00 0.0 - 0.2 K/uL    Differential Type AUTOMATED      Seg Neutrophils 72 43 - 78 %    Lymphocytes 17 13 - 44 %    Monocytes 10 4.0 - 12.0 %    Eosinophils % 1 0.5 - 7.8 %    Basophils 0 0.0 - 2.0 %    Immature Granulocytes 0 0.0 - 5.0 %    Segs Absolute 8.1 1.7 - 8.2 K/UL    Absolute Lymph # 1.9 0.5 - 4.6 K/UL    Absolute Mono # 1.2 0.1 - 1.3 K/UL    Absolute Eos # 0.1 0.0 - 0.8 K/UL    Basophils Absolute 0.0 0.0 - 0.2 K/UL    Absolute Immature Granulocyte 0.0 0.0 - 0.5 K/UL   Magnesium    Collection Time: 09/21/22  3:25 AM   Result Value Ref Range    Magnesium 2.0 1.8 - 2.4 mg/dL       I have personally reviewed imaging studies showing: Other Studies:  MRI BRAIN WO CONTRAST   Final Result   No evidence of acute intracranial abnormality         CT HEAD WO CONTRAST   Final Result   No acute process.           Current Meds:  Current Facility-Administered Medications   Medication Dose Route Frequency    sodium chloride flush 0.9 % injection 5-40 mL  5-40 mL IntraVENous PRN    0.9 % sodium chloride infusion   IntraVENous PRN    thiamine tablet 100 mg  100 mg Oral Daily    LORazepam (ATIVAN) tablet 1 mg  1 mg Oral Q1H PRN    Or    LORazepam (ATIVAN) injection 1 mg  1 mg IntraVENous Q1H PRN    Or    LORazepam (ATIVAN) tablet 2 mg  2 mg Oral Q1H PRN    Or    LORazepam (ATIVAN) injection 2 mg  2 mg IntraVENous Q1H PRN    Or    LORazepam (ATIVAN) tablet 3 mg  3 mg Oral Q1H PRN    Or    LORazepam (ATIVAN) injection 3 mg  3 mg IntraVENous Q1H PRN    Or    LORazepam (ATIVAN) tablet 4 mg  4 mg Oral Q1H PRN    Or    LORazepam (ATIVAN) injection 4 mg  4 mg IntraVENous Q1H PRN    nicotine (NICODERM CQ) 14 MG/24HR 1 patch  1 patch TransDERmal Daily PRN    acetaminophen (TYLENOL) tablet 650 mg  650 mg Oral Q4H PRN    Or    acetaminophen (TYLENOL) suppository 650 mg  650 mg Rectal Q4H PRN    ondansetron (ZOFRAN-ODT) disintegrating tablet 4 mg  4 mg Oral Q8H PRN    Or    ondansetron (ZOFRAN) injection 4 mg  4 mg IntraVENous Q6H PRN    polyethylene glycol (GLYCOLAX) packet 17 g  17 g Oral Daily PRN    bisacodyl (DULCOLAX) suppository 10 mg  10 mg Rectal Daily PRN    aspirin EC tablet 81 mg  81 mg Oral Daily    Or    aspirin suppository 300 mg  300 mg Rectal Daily    atorvastatin (LIPITOR) tablet 80 mg  80 mg Oral Nightly    labetalol (NORMODYNE;TRANDATE) injection 10 mg  10 mg IntraVENous Q10 Min PRN    traMADol (ULTRAM) tablet 50 mg  50 mg Oral Q6H PRN    traZODone (DESYREL) tablet 100 mg  100 mg Oral Nightly    buPROPion (WELLBUTRIN SR) extended release tablet 150 mg  150 mg Oral BID    loperamide (IMODIUM) capsule 2 mg  2 mg Oral 4x Daily PRN       Signed:  Sarah Rosenbaum MD

## 2022-09-21 NOTE — PROGRESS NOTES
Neurology Daily Progress Note     Assessment:     48year old male with stroke symptoms (left sided numbness/weakness) s/p tenecteplase. CTA showed a basilar artery occlusion. Neurointerventionalists at [facility] were contacted by ED physician. 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 No stroke on MRI. Based on length of symptoms, this would not be consistent with a TIA. Plan:       Continue aspirin   Continue statin   PT/OT  Discussed importance of decreasing alcohol use  We will sign off     Subjective: Interval history:    Patient doing well. Reports left sided weakness     History:    Corry Ko is a 48 y.o. male who is being seen for stroke symptoms. Past Medical History:   Diagnosis Date    Borderline diabetes     Depression     Hypertension      No past surgical history on file. Family History   Problem Relation Age of Onset    Diabetes Mother     Seizures Father      Social History     Tobacco Use    Smoking status: Never    Smokeless tobacco: Never   Substance Use Topics    Alcohol use:  Yes     Alcohol/week: 12.0 standard drinks     Types: 12 Cans of beer per week      Current Facility-Administered Medications   Medication Dose Route Frequency Provider Last Rate Last Admin    chlordiazePOXIDE (LIBRIUM) capsule 10 mg  10 mg Oral TID Elie Claros MD        sodium chloride flush 0.9 % injection 5-40 mL  5-40 mL IntraVENous PRN Angela Haskins MD        0.9 % sodium chloride infusion   IntraVENous PRN Angela Haskins MD        thiamine tablet 100 mg  100 mg Oral Daily Angela Haskins MD   100 mg at 09/21/22 0821    LORazepam (ATIVAN) tablet 1 mg  1 mg Oral Q1H PRN Angela Haskins MD   1 mg at 09/21/22 0346    Or    LORazepam (ATIVAN) injection 1 mg  1 mg IntraVENous Q1H PRN Angela Haskins MD        Or    LORazepam (ATIVAN) tablet 2 mg  2 mg Oral Q1H PRN Angela Haskins MD        Or    LORazepam (ATIVAN) injection 2 mg  2 mg IntraVENous Q1H PRN Angela Haskins MD        Or LORazepam (ATIVAN) tablet 3 mg  3 mg Oral Q1H PRN Dipti Linda MD        Or    LORazepam (ATIVAN) injection 3 mg  3 mg IntraVENous Q1H PRN Dipti Linda MD        Or    LORazepam (ATIVAN) tablet 4 mg  4 mg Oral Q1H PRN Dipti Linda MD        Or    LORazepam (ATIVAN) injection 4 mg  4 mg IntraVENous Q1H PRN Dipti Linda MD        nicotine (NICODERM CQ) 14 MG/24HR 1 patch  1 patch TransDERmal Daily PRN Dm Yadav MD        acetaminophen (TYLENOL) tablet 650 mg  650 mg Oral Q4H PRN Dm Yadav MD   650 mg at 09/21/22 0345    Or    acetaminophen (TYLENOL) suppository 650 mg  650 mg Rectal Q4H PRN Dm Yadav MD        ondansetron (ZOFRAN-ODT) disintegrating tablet 4 mg  4 mg Oral Q8H PRN Dm Yadav MD        Or    ondansetron (ZOFRAN) injection 4 mg  4 mg IntraVENous Q6H PRN Dm Yadav MD        polyethylene glycol (GLYCOLAX) packet 17 g  17 g Oral Daily PRN Dm Yadav MD        bisacodyl (DULCOLAX) suppository 10 mg  10 mg Rectal Daily PRN Dm Yadav MD        aspirin EC tablet 81 mg  81 mg Oral Daily Dm Yadav MD   81 mg at 09/21/22 0331    Or    aspirin suppository 300 mg  300 mg Rectal Daily Dm Yadav MD        atorvastatin (LIPITOR) tablet 80 mg  80 mg Oral Nightly Dm Yadav MD   80 mg at 09/20/22 1925    labetalol (NORMODYNE;TRANDATE) injection 10 mg  10 mg IntraVENous Q10 Min PRN Dm Yadav MD        traMADol (ULTRAM) tablet 50 mg  50 mg Oral Q6H PRN Dm Yadav MD        traZODone (DESYREL) tablet 100 mg  100 mg Oral Nightly Dm Yadav MD   100 mg at 09/20/22 2128    buPROPion [employer] SR) extended release tablet 150 mg  150 mg Oral BID Dm Yadav MD   150 mg at 09/21/22 0534    loperamide (IMODIUM) capsule 2 mg  2 mg Oral 4x Daily PRN Dm Yadav MD   2 mg at 09/20/22 1008        Allergies   Allergen Reactions    Cashews [Macadamia Nut Oil] Anaphylaxis    Coconut (Cocos Nucifera) Allergy Skin Test Itching    Penicillins Hives       Review of systems negative with exception of pertinent positives and negatives noted above.        Objective:     Vitals:    09/21/22 0700 09/21/22 0702 09/21/22 0715 09/21/22 0735   BP:  97/63  85/65   Pulse: 68 68 72    Resp: 15 17 12    Temp: 97.9 °F (36.6 °C)      TempSrc: Oral      SpO2: 94% 93% 94%    Weight:       Height:              Current Facility-Administered Medications:     chlordiazePOXIDE (LIBRIUM) capsule 10 mg, 10 mg, Oral, TID, Rosemarie Ramey MD    sodium chloride flush 0.9 % injection 5-40 mL, 5-40 mL, IntraVENous, PRN, Miriam Bob MD    0.9 % sodium chloride infusion, , IntraVENous, PRN, Miriam Bob MD    thiamine tablet 100 mg, 100 mg, Oral, Daily, Miriam Bob MD, 100 mg at 09/21/22 0162    LORazepam (ATIVAN) tablet 1 mg, 1 mg, Oral, Q1H PRN, 1 mg at 09/21/22 0346 **OR** LORazepam (ATIVAN) injection 1 mg, 1 mg, IntraVENous, Q1H PRN **OR** LORazepam (ATIVAN) tablet 2 mg, 2 mg, Oral, Q1H PRN **OR** LORazepam (ATIVAN) injection 2 mg, 2 mg, IntraVENous, Q1H PRN **OR** LORazepam (ATIVAN) tablet 3 mg, 3 mg, Oral, Q1H PRN **OR** LORazepam (ATIVAN) injection 3 mg, 3 mg, IntraVENous, Q1H PRN **OR** LORazepam (ATIVAN) tablet 4 mg, 4 mg, Oral, Q1H PRN **OR** LORazepam (ATIVAN) injection 4 mg, 4 mg, IntraVENous, Q1H PRN, Miriam Bob MD    nicotine (NICODERM CQ) 14 MG/24HR 1 patch, 1 patch, TransDERmal, Daily PRN, Edil Cool MD    acetaminophen (TYLENOL) tablet 650 mg, 650 mg, Oral, Q4H PRN, 650 mg at 09/21/22 0345 **OR** acetaminophen (TYLENOL) suppository 650 mg, 650 mg, Rectal, Q4H PRN, Edil Cool MD    ondansetron (ZOFRAN-ODT) disintegrating tablet 4 mg, 4 mg, Oral, Q8H PRN **OR** ondansetron (ZOFRAN) injection 4 mg, 4 mg, IntraVENous, Q6H PRN, Edil Cool MD    polyethylene glycol (GLYCOLAX) packet 17 g, 17 g, Oral, Daily PRN, Edil Cool MD    bisacodyl (DULCOLAX) suppository 10 mg, 10 mg, Rectal, Daily PRN, Edil Cool MD    aspirin EC tablet 81 mg, 81 mg, Oral, Daily, 81 mg at 09/21/22 0821 **OR** aspirin suppository 300 mg, 300 mg, Rectal, Daily, Román Miller MD    atorvastatin (LIPITOR) tablet 80 mg, 80 mg, Oral, Nightly, Román Miller MD, 80 mg at 09/20/22 1925    labetalol (NORMODYNE;TRANDATE) injection 10 mg, 10 mg, IntraVENous, Q10 Min PRN, Román Miller MD    traMADol (ULTRAM) tablet 50 mg, 50 mg, Oral, Q6H PRN, Román Miller MD    traZODone (DESYREL) tablet 100 mg, 100 mg, Oral, Nightly, Román Miller MD, 100 mg at 09/20/22 2128    buPROPion [facility]) extended release tablet 150 mg, 150 mg, Oral, BID, Román Miller MD, 150 mg at 09/21/22 4034    loperamide (IMODIUM) capsule 2 mg, 2 mg, Oral, 4x Daily PRN, Román Miller MD, 2 mg at 09/20/22 1008    Recent Results (from the past 12 hour(s))   Basic Metabolic Panel w/ Reflex to MG    Collection Time: 09/21/22  3:25 AM   Result Value Ref Range    Sodium 138 136 - 145 mmol/L    Potassium 3.5 3.5 - 5.1 mmol/L    Chloride 103 101 - 110 mmol/L    CO2 25 21 - 32 mmol/L    Anion Gap 10 4 - 13 mmol/L    Glucose 156 (H) 65 - 100 mg/dL    BUN 12 6 - 23 MG/DL    Creatinine 1.20 0.8 - 1.5 MG/DL    GFR African American >60 >60 ml/min/1.73m2    GFR Non- >60 >60 ml/min/1.73m2    Calcium 8.8 8.3 - 10.4 MG/DL   CBC with Auto Differential    Collection Time: 09/21/22  3:25 AM   Result Value Ref Range    WBC 11.3 (H) 4.3 - 11.1 K/uL    RBC 4.98 4.23 - 5.6 M/uL    Hemoglobin 16.2 13.6 - 17.2 g/dL    Hematocrit 45.9 41.1 - 50.3 %    MCV 92.2 79.6 - 97.8 FL    MCH 32.5 26.1 - 32.9 PG    MCHC 35.3 (H) 31.4 - 35.0 g/dL    RDW 11.9 11.9 - 14.6 %    Platelets 151 366 - 380 K/uL    MPV 9.5 9.4 - 12.3 FL    nRBC 0.00 0.0 - 0.2 K/uL    Differential Type AUTOMATED      Seg Neutrophils 72 43 - 78 %    Lymphocytes 17 13 - 44 %    Monocytes 10 4.0 - 12.0 %    Eosinophils % 1 0.5 - 7.8 %    Basophils 0 0.0 - 2.0 %    Immature Granulocytes 0 0.0 - 5.0 %    Segs Absolute 8.1 1.7 - 8.2 K/UL    Absolute Lymph # 1.9 0.5 - 4.6 K/UL    Absolute Mono # 1.2 0.1 - 1.3 K/UL    Absolute Eos # 0.1 0.0 - 0.8 K/UL Basophils Absolute 0.0 0.0 - 0.2 K/UL    Absolute Immature Granulocyte 0.0 0.0 - 0.5 K/UL   Magnesium    Collection Time: 09/21/22  3:25 AM   Result Value Ref Range    Magnesium 2.0 1.8 - 2.4 mg/dL   Urinalysis w rflx microscopic    Collection Time: 09/21/22  9:17 AM   Result Value Ref Range    Color, UA YELLOW/STRAW      Appearance CLOUDY      Specific Gravity, UA 1.025 (H) 1.001 - 1.023      pH, Urine 5.5 5.0 - 9.0      Protein,  (A) NEG mg/dL    Glucose, UA Negative mg/dL    Ketones, Urine Negative NEG mg/dL    Bilirubin Urine Negative NEG      Blood, Urine LARGE (A) NEG      Urobilinogen, Urine 1.0 0.2 - 1.0 EU/dL    Nitrite, Urine Positive (A) NEG      Leukocyte Esterase, Urine MODERATE (A) NEG      WBC, UA >100 (A) NORM /hpf    RBC, UA >100 (A) NORM /hpf    Epithelial Cells UA 0-5 (A) NORM /hpf    BACTERIA, URINE 4+ (A) NORM /hpf    Casts 2-5 (A) NORM /lpf       CT Results (most recent):  CT reviewed. Results do not populate into note    Most recent MRI   No results found for this or any previous visit. Most recent MRA   No results found for this or any previous visit. Most recent CTA  No results found for this or any previous visit. Most recent Echo  Results for orders placed during the hospital encounter of 09/19/22    Transthoracic echocardiogram (TTE) complete with contrast, bubble, strain, and 3D PRN    Interpretation Summary  Formatting of this result is different from the original.      Left Ventricle: Normal left ventricular systolic function with a visually estimated EF of 55 - 60%. Left ventricle size is normal. Normal wall thickness. Normal wall motion. Normal diastolic function. Interatrial Septum: Agitated saline study was negative with and without provocation. Technical qualifiers: Color flow Doppler was performed and pulse wave and/or continuous wave Doppler was performed. Physical Exam:  General - Well developed, well nourished, in no apparent distress. Pleasant and conversant. HEENT - Normocephalic, atraumatic. Conjunctiva, tympanic membranes, and oropharynx are clear. Neck - Supple without masses  Extremities - Peripheral pulses intact. No edema and no rashes. Neurological examination - Comprehension, attention, memory and reasoning are intact. Language and speech are normal.   On cranial nerve examination, (II, III, IV, VI) pupils are equal, round, and reactive to light. Visual acuity is adequate. Visual fields are full to finger confrontation. Extraocular motility is normal. (V, VII) Face is symmetric and sensation is impaired to light touch on the left. (VIII) Hearing is intact. (IX, X) Palate and uvula elevate symmetrically. Voice is normal. (XI) Shoulder shrug is strong and equal bilaterally. (XII)Tongue is midline. Motor examination - There is normal muscle tone and bulk. Power is full throughout, with exception of very mild pronation on the left. Muscle stretch reflexes are 1+ throughout. Plantar response is flexor bilaterally. Sensation is intact to light touch in LUE, LLE. Cerebellar examination is normal.     I spent 25 minutes today with the patient, which included chart review, documentation, and greater than 50% of time was spent in direct face-to-face contact, obtaining history, exam, coordinating care, and counseling the patient on medical condition.      Signed By: PEDRO Mary Asp     September 21, 2022

## 2022-09-21 NOTE — CARE COORDINATION
Chart reviewed and pt discussed in am IDR. Consult for rehab. [facility] following, but according to therapy, only outpatient for PT. No OT needs.  CM following for d/c needs/POC per MD.

## 2022-09-22 VITALS
HEART RATE: 100 BPM | BODY MASS INDEX: 26.95 KG/M2 | OXYGEN SATURATION: 96 % | RESPIRATION RATE: 16 BRPM | DIASTOLIC BLOOD PRESSURE: 56 MMHG | WEIGHT: 188.27 LBS | SYSTOLIC BLOOD PRESSURE: 90 MMHG | HEIGHT: 70 IN | TEMPERATURE: 98.1 F

## 2022-09-22 LAB
ANION GAP SERPL CALC-SCNC: 5 MMOL/L (ref 4–13)
BUN SERPL-MCNC: 15 MG/DL (ref 6–23)
CALCIUM SERPL-MCNC: 8.8 MG/DL (ref 8.3–10.4)
CHLORIDE SERPL-SCNC: 105 MMOL/L (ref 101–110)
CO2 SERPL-SCNC: 28 MMOL/L (ref 21–32)
CREAT SERPL-MCNC: 1.1 MG/DL (ref 0.8–1.5)
GLUCOSE SERPL-MCNC: 128 MG/DL (ref 65–100)
POTASSIUM SERPL-SCNC: 3.7 MMOL/L (ref 3.5–5.1)
SODIUM SERPL-SCNC: 138 MMOL/L (ref 138–145)

## 2022-09-22 PROCEDURE — 36415 COLL VENOUS BLD VENIPUNCTURE: CPT

## 2022-09-22 PROCEDURE — 6370000000 HC RX 637 (ALT 250 FOR IP): Performed by: INTERNAL MEDICINE

## 2022-09-22 PROCEDURE — 6360000002 HC RX W HCPCS: Performed by: HOSPITALIST

## 2022-09-22 PROCEDURE — 6370000000 HC RX 637 (ALT 250 FOR IP): Performed by: HOSPITALIST

## 2022-09-22 PROCEDURE — 80048 BASIC METABOLIC PNL TOTAL CA: CPT

## 2022-09-22 RX ORDER — TAMSULOSIN HYDROCHLORIDE 0.4 MG/1
0.4 CAPSULE ORAL DAILY
Qty: 90 CAPSULE | Refills: 1 | Status: SHIPPED | OUTPATIENT
Start: 2022-09-22

## 2022-09-22 RX ORDER — LORAZEPAM 0.5 MG/1
0.5 TABLET ORAL EVERY 8 HOURS PRN
Qty: 15 TABLET | Refills: 0 | Status: SHIPPED | OUTPATIENT
Start: 2022-09-22 | End: 2022-09-27

## 2022-09-22 RX ORDER — ASPIRIN 81 MG/1
81 TABLET ORAL DAILY
Qty: 30 TABLET | Refills: 3 | Status: SHIPPED | OUTPATIENT
Start: 2022-09-22

## 2022-09-22 RX ORDER — LORAZEPAM 2 MG/ML
1 INJECTION INTRAMUSCULAR ONCE
Status: COMPLETED | OUTPATIENT
Start: 2022-09-22 | End: 2022-09-22

## 2022-09-22 RX ORDER — FINASTERIDE 5 MG/1
5 TABLET, FILM COATED ORAL DAILY
Qty: 30 TABLET | Refills: 3 | Status: SHIPPED | OUTPATIENT
Start: 2022-09-22 | End: 2022-10-24 | Stop reason: ALTCHOICE

## 2022-09-22 RX ORDER — CIPROFLOXACIN 500 MG/1
500 TABLET, FILM COATED ORAL EVERY 12 HOURS SCHEDULED
Qty: 13 TABLET | Refills: 0 | Status: SHIPPED | OUTPATIENT
Start: 2022-09-22 | End: 2022-09-29

## 2022-09-22 RX ORDER — NICOTINE 21 MG/24HR
1 PATCH, TRANSDERMAL 24 HOURS TRANSDERMAL DAILY PRN
Qty: 30 PATCH | Refills: 3 | Status: SHIPPED | OUTPATIENT
Start: 2022-09-22 | End: 2022-10-24 | Stop reason: ALTCHOICE

## 2022-09-22 RX ORDER — ATORVASTATIN CALCIUM 80 MG/1
80 TABLET, FILM COATED ORAL NIGHTLY
Qty: 30 TABLET | Refills: 3 | Status: SHIPPED | OUTPATIENT
Start: 2022-09-22

## 2022-09-22 RX ORDER — LANOLIN ALCOHOL/MO/W.PET/CERES
100 CREAM (GRAM) TOPICAL DAILY
Qty: 30 TABLET | Refills: 3 | Status: SHIPPED | OUTPATIENT
Start: 2022-09-22 | End: 2022-10-24 | Stop reason: SDUPTHER

## 2022-09-22 RX ORDER — CHLORDIAZEPOXIDE HYDROCHLORIDE 5 MG/1
5 CAPSULE, GELATIN COATED ORAL 3 TIMES DAILY
Qty: 15 CAPSULE | Refills: 0 | Status: SHIPPED | OUTPATIENT
Start: 2022-09-22 | End: 2022-09-27

## 2022-09-22 RX ORDER — SACCHAROMYCES BOULARDII 250 MG
250 CAPSULE ORAL 2 TIMES DAILY
Qty: 14 CAPSULE | Refills: 0 | Status: SHIPPED | OUTPATIENT
Start: 2022-09-22 | End: 2022-09-29

## 2022-09-22 RX ADMIN — CIPROFLOXACIN 500 MG: 250 TABLET, FILM COATED ORAL at 09:02

## 2022-09-22 RX ADMIN — CHLORDIAZEPOXIDE HYDROCHLORIDE 10 MG: 10 CAPSULE ORAL at 09:02

## 2022-09-22 RX ADMIN — Medication 100 MG: at 09:02

## 2022-09-22 RX ADMIN — BUPROPION HYDROCHLORIDE 150 MG: 150 TABLET, EXTENDED RELEASE ORAL at 09:02

## 2022-09-22 RX ADMIN — ASPIRIN 81 MG: 81 TABLET ORAL at 09:02

## 2022-09-22 RX ADMIN — LORAZEPAM 1 MG: 2 INJECTION INTRAMUSCULAR; INTRAVENOUS at 09:02

## 2022-09-22 RX ADMIN — Medication 250 MG: at 09:02

## 2022-09-22 ASSESSMENT — PAIN SCALES - GENERAL
PAINLEVEL_OUTOF10: 0
PAINLEVEL_OUTOF10: 0

## 2022-09-22 NOTE — PROGRESS NOTES
Initial visit was made, emotional support and a spiritual presence were provided.    Eleni Powell, [address] [facility], [facility]

## 2022-09-22 NOTE — PROGRESS NOTES
Pt discharged with wife to home. All AVS paperwork discussed and all questions answered - belongings taken with pt.

## 2022-09-22 NOTE — PROGRESS NOTES
Bedside and verbal shift change report received from  [facility] (offgoing nurse). Report included the following information SBAR, Kardex, Intake/Output, MAR, Recent Results, Cardiac Rhythm NSR, Alarm Parameters , Quality Measures, and Dual Neuro Assessment and full NIH performed with offgoing RN.      Dual skin assessment completed at bedside: WDL (list pertinent skin assessment findings)    Dual verification of gtts completed (name of gtts verified): n/a

## 2022-09-22 NOTE — CARE COORDINATION
09/22/22 [address] Provided? No   Mode of Transport at Discharge Other (see comment)   Confirm Follow Up Transport Family   Condition of Participation: Discharge Planning   The Patient and/or Patient Representative was provided with a Choice of Provider? Patient   Freedom of Choice list was provided with basic dialogue that supports the patient's individualized plan of care/goals, treatment preferences, and shares the quality data associated with the providers? Yes   Patient was given information on recovery programs as requested. Patient would like outpatient PT and ST. CM called [phone] and left a ms with PT. CM will continue to make efforts to reach outpatient therapy.

## 2022-09-22 NOTE — DISCHARGE SUMMARY
Hospitalist Discharge Summary     Patient ID:  Michelle Amado  [id number]  36 y.o.  1975  Admit date: 9/19/2022  2:24 PM  Discharge date and time: 9/22/2022  Attending: Alex Vu MD  PCP:  PEDRO Navarro NP  Treatment Team: Attending Provider: Alex Vu MD; : Guy Wilson, RN; Consulting Physician: Govind Brewster DO; Utilization Reviewer: Kaylene Aly RN; Hospitalist: Alex Vu MD    Principal Diagnosis Cerebrovascular accident (CVA) [facility])   Principal Problem:    Cerebrovascular accident (CVA) [facility])  Active Problems:    Hypertension    Borderline diabetes    Depression    Alcohol withdrawal syndrome without complication ([facility] [address]    Left sided numbness    Acute cystitis without hematuria  Resolved Problems:    * No resolved hospital problems. *     Initial H&P:  Michelle Amado is a 48 y.o. male with medical history of prediabetes, hypertension, depression who presented with chest pain and left-sided weakness. Patient reports having his first episode of left-sided, pressure-like chest pain on Saturday while attending [employer] versus [employer] football game. Reports getting very hot and subsequently passed out for 15 minutes. He experienced another episode of chest pain while at work. Reports shortness of breath with walking short distance this morning as well. Reports left-sided numbness, tingling. Symptoms started around 7:45AM.   Saw his in house PCP at work who sent him to ED. CODE S was called in the ED with NIHSS of 2. Telenuro recommended tPA and he received it at 1058. 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  discussed Dr. Shay Luke at [facility]. She as well as neuro interventionalists looked over CTA and noted no occlusion or thrombus. States that there is no intervention that they would perform at this time.   States the patient can remain at [facility] and does not need to be transferred to [facility]. Laboratory work-up is unremarkable. Urine toxicology is positive for amphetamines. Chest x-ray with low lung volumes with bibasilar atelectasis. EKG with normal sinus rhythm without ST or T wave abnormalities. Hospital Course:  Acute Ischemic CVA s/p tPA  Presented with left sided weakness with NIHSS of 2 on admission. Teleneuro evaluated and recommended tPA @ 1058. CT Head without acute abnormalities. 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  discussed Dr. Penny Wilkinson at [facility]. She as well as neuro interventionalists looked over CTA and noted no occlusion or thrombus. Stated that there is no intervention that they would perform at this time. Patient was not transferred to [facility]. 9/22-MRI brain with negative for acute intracranial abnormality. Improving numbness and weakness left side  EEG Negative for cva  Continue aspirin and Lipitor. Echo with normal EF 55 to 60%, no shunt appreciated. Alcohol withdrawal syndrome without complication  Plan: 7/94-  Continue Librium 10 mg p.o. 3 times daily for 5 days. As needed Ativan for agitation restlessness. Counseled for alcohol cessation.  to assist with providing information for alcohol cessation. Acute cystitis without hematuria  Plan: 9/22-  Urinalysis positive for 4+bacteriuria  Continue ciprofloxacin 500 mg p.o. twice daily for 7 days. Urine culture positive for gram-negative rods. Syncope and collapse   Had 1 episodes of syncope at [facility] this sat where he passed out for 15mins  -EEG normal  - working up for CVA as above     Chest pain  EKG normal with no troponin. Utox positive for amphetamines. Had pressure like chest pain that grows across his chest with SOB today with minimal exertion. - consider Cardiology consult if continues to have cp.  - check troponin.   9/20 -resolved, normal trop, normal echo- no wall motion abnormality     Pre-diabetes : on metformin at home     Depression: continue wellbutrin     Disposition:  Patient is medically cleared and hemodynamically stable for discharge. Rest of the hospital course was uneventful, for further details please refer to daily progress notes. Significant Diagnostic Studies:   EXAM: Noncontrast CT head. INDICATION: Syncope. COMPARISON: Yesterday's CT head. TECHNIQUE: Noncontrast CT images of the head were obtained. Radiation dose   reduction techniques were used for this study. Our CT scanners use one or all   of the following:  Automated exposure control, adjustment of the mA or kV   according to patient size, iterative reconstruction. FINDINGS: Brain volume is appropriate for age. No acute infarct, hemorrhage or   mass is identified. There is no mass effect, midline shift or depressed   fracture. The visualized paranasal sinuses and mastoid air cells are clear. Impression   No acute process. MRI of the brain        INDICATION: Left-sided weakness       Standard MRI sequences were obtained through the brain in multiple planes   without IV contrast       FINDINGS:   There are no areas of significant signal abnormality in the brain. There is no   evidence of acute hemorrhage or infarction. The ventricles are normal in size. There are no extra-axial fluid collections. There are no intracranial masses. There are no bony lesions. The sinuses are clear.            Impression   No evidence of acute intracranial abnormality         Labs: Results:       Chemistry Recent Labs     09/19/22  0944 09/20/22 0412 09/21/22  0325 09/22/22  0329    139 138 138   K 4.0 3.5 3.5 3.7    104 103 105   CO2 28 30 25 28   BUN 19* 14 12 15   GLOB 2.6  --   --   --       CBC w/Diff Recent Labs     09/19/22  0944 09/20/22  0412 09/21/22  0325   WBC 6.9 8.5 11.3*   RBC 4.87 4.86 4.98   HGB 16.1 15.7 16.2   HCT 44.4 44.9 45.9    253 253      Cardiac Enzymes No results for input(s): CPK, MARY in the last 72 hours. Invalid input(s): CKRMB, CKND1, TROIP   Coagulation Recent Labs     09/19/22  0944   INR 1.0       Lipid Panel Lab Results   Component Value Date/Time    CHOL 172 09/19/2022 06:25 PM    HDL 36 09/19/2022 06:25 PM      BNP Invalid input(s): BNPP   Liver Enzymes No results for input(s): TP, ALB in the last 72 hours. Invalid input(s): TBIL, AP, SGOT, GPT, DBIL   Thyroid Studies No results found for: T4, TSH         Discharge Exam:  /73   Pulse 87   Temp 98.4 °F (36.9 °C) (Oral)   Resp 17   Ht 5' 10\" (1.778 m)   Wt 188 lb 4.4 oz (85.4 kg)   SpO2 94%   BMI 27.01 kg/m²   General appearance: alert, cooperative, no distress, appears stated age  Lungs: clear to auscultation bilaterally  Heart: regular rate and rhythm, S1, S2 normal, no murmur, click, rub or gallop  Abdomen: soft, non-tender. Bowel sounds normal. No masses,  no organomegaly  Extremities: no cyanosis or edema  Neurologic: Grossly normal    Disposition: home  Discharge Condition: stable  Patient Instructions:      Medication List        START taking these medications      aspirin 81 MG EC tablet  Take 1 tablet by mouth daily     atorvastatin 80 MG tablet  Commonly known as: LIPITOR  Take 1 tablet by mouth nightly     chlordiazePOXIDE 5 MG capsule  Commonly known as: LIBRIUM  Take 1 capsule by mouth 3 times daily for 5 days.      ciprofloxacin 500 MG tablet  Commonly known as: CIPRO  Take 1 tablet by mouth every 12 hours for 13 doses     nicotine 14 MG/24HR  Commonly known as: NICODERM CQ  Place 1 patch onto the skin daily as needed (smoking cravings)     saccharomyces boulardii 250 MG capsule  Commonly known as: FLORASTOR  Take 1 capsule by mouth 2 times daily for 7 days     thiamine 100 MG tablet  Take 1 tablet by mouth daily            CHANGE how you take these medications      traZODone 50 MG tablet  Commonly known as: DESYREL  What changed: Another medication with the same name was removed. Continue taking this medication, and follow the directions you see here. CONTINUE taking these medications      buPROPion 300 MG extended release tablet  Commonly known as: WELLBUTRIN XL     metFORMIN 500 MG tablet  Commonly known as: GLUCOPHAGE     olmesartan-hydroCHLOROthiazide 20-12.5 MG per tablet  Commonly known as: BENICAR HCT            STOP taking these medications      olmesartan 5 MG tablet  Commonly known as: BENICAR               Where to Get Your Medications        These medications were sent to [address]      Phone: [phone]   aspirin 81 MG EC tablet  atorvastatin 80 MG tablet  chlordiazePOXIDE 5 MG capsule  ciprofloxacin 500 MG tablet  nicotine 14 MG/24HR  saccharomyces boulardii 250 MG capsule  thiamine 100 MG tablet         Activity: activity as tolerated  Diet: diabetic diet and low fat, low cholesterol diet  Wound Care: none needed    Follow-up  Follow-up with PCP next 1 to 2 weeks.   Time spent to discharge patient 35 minutes  Signed:  Berto Borjas MD  9/22/2022  7:19 AM

## 2022-09-23 LAB
BACTERIA SPEC CULT: ABNORMAL
SERVICE CMNT-IMP: ABNORMAL

## 2022-10-09 ENCOUNTER — HOSPITAL ENCOUNTER (EMERGENCY)
Age: 47
Discharge: HOME | End: 2022-10-09
Attending: EMERGENCY MEDICINE
Payer: COMMERCIAL

## 2022-10-09 ENCOUNTER — HOSPITAL ENCOUNTER (EMERGENCY)
Age: 47
Discharge: ANOTHER HOSPITAL | End: 2022-10-09
Attending: EMERGENCY MEDICINE
Payer: COMMERCIAL

## 2022-10-09 ENCOUNTER — ANESTHESIA (OUTPATIENT)
Dept: ENDOSCOPY | Age: 47
End: 2022-10-09
Payer: COMMERCIAL

## 2022-10-09 ENCOUNTER — APPOINTMENT (OUTPATIENT)
Dept: GENERAL RADIOLOGY | Age: 47
End: 2022-10-09
Payer: COMMERCIAL

## 2022-10-09 ENCOUNTER — ANESTHESIA EVENT (OUTPATIENT)
Dept: ENDOSCOPY | Age: 47
End: 2022-10-09
Payer: COMMERCIAL

## 2022-10-09 VITALS
DIASTOLIC BLOOD PRESSURE: 89 MMHG | RESPIRATION RATE: 18 BRPM | HEIGHT: 71 IN | WEIGHT: 185 LBS | OXYGEN SATURATION: 97 % | SYSTOLIC BLOOD PRESSURE: 117 MMHG | HEART RATE: 79 BPM | TEMPERATURE: 97.7 F | BODY MASS INDEX: 25.9 KG/M2

## 2022-10-09 VITALS
SYSTOLIC BLOOD PRESSURE: 120 MMHG | HEART RATE: 99 BPM | TEMPERATURE: 98 F | OXYGEN SATURATION: 99 % | RESPIRATION RATE: 15 BRPM | DIASTOLIC BLOOD PRESSURE: 75 MMHG

## 2022-10-09 DIAGNOSIS — T18.128A ESOPHAGEAL OBSTRUCTION DUE TO FOOD IMPACTION: Primary | ICD-10-CM

## 2022-10-09 DIAGNOSIS — K22.2 ESOPHAGEAL OBSTRUCTION DUE TO FOOD IMPACTION: Primary | ICD-10-CM

## 2022-10-09 PROCEDURE — 2500000003 HC RX 250 WO HCPCS: Performed by: NURSE ANESTHETIST, CERTIFIED REGISTERED

## 2022-10-09 PROCEDURE — 2709999900 HC NON-CHARGEABLE SUPPLY: Performed by: INTERNAL MEDICINE

## 2022-10-09 PROCEDURE — 99284 EMERGENCY DEPT VISIT MOD MDM: CPT

## 2022-10-09 PROCEDURE — 2580000003 HC RX 258: Performed by: NURSE ANESTHETIST, CERTIFIED REGISTERED

## 2022-10-09 PROCEDURE — 6360000002 HC RX W HCPCS: Performed by: ANESTHESIOLOGY

## 2022-10-09 PROCEDURE — 7100000001 HC PACU RECOVERY - ADDTL 15 MIN: Performed by: INTERNAL MEDICINE

## 2022-10-09 PROCEDURE — 70360 X-RAY EXAM OF NECK: CPT

## 2022-10-09 PROCEDURE — 7100000011 HC PHASE II RECOVERY - ADDTL 15 MIN: Performed by: INTERNAL MEDICINE

## 2022-10-09 PROCEDURE — 7100000000 HC PACU RECOVERY - FIRST 15 MIN: Performed by: INTERNAL MEDICINE

## 2022-10-09 PROCEDURE — 99285 EMERGENCY DEPT VISIT HI MDM: CPT

## 2022-10-09 PROCEDURE — 6360000002 HC RX W HCPCS: Performed by: NURSE ANESTHETIST, CERTIFIED REGISTERED

## 2022-10-09 PROCEDURE — 3609017100 HC EGD: Performed by: INTERNAL MEDICINE

## 2022-10-09 PROCEDURE — 3700000000 HC ANESTHESIA ATTENDED CARE: Performed by: INTERNAL MEDICINE

## 2022-10-09 PROCEDURE — 7100000010 HC PHASE II RECOVERY - FIRST 15 MIN: Performed by: INTERNAL MEDICINE

## 2022-10-09 PROCEDURE — 71046 X-RAY EXAM CHEST 2 VIEWS: CPT

## 2022-10-09 PROCEDURE — 2720000010 HC SURG SUPPLY STERILE: Performed by: INTERNAL MEDICINE

## 2022-10-09 PROCEDURE — 43247 EGD REMOVE FOREIGN BODY: CPT

## 2022-10-09 PROCEDURE — 3700000001 HC ADD 15 MINUTES (ANESTHESIA): Performed by: INTERNAL MEDICINE

## 2022-10-09 RX ORDER — SODIUM CHLORIDE, SODIUM LACTATE, POTASSIUM CHLORIDE, CALCIUM CHLORIDE 600; 310; 30; 20 MG/100ML; MG/100ML; MG/100ML; MG/100ML
INJECTION, SOLUTION INTRAVENOUS CONTINUOUS PRN
Status: DISCONTINUED | OUTPATIENT
Start: 2022-10-09 | End: 2022-10-09 | Stop reason: SDUPTHER

## 2022-10-09 RX ORDER — LORAZEPAM 0.5 MG/1
0.5 TABLET ORAL PRN
COMMUNITY
Start: 2022-09-22 | End: 2022-10-27

## 2022-10-09 RX ORDER — OXYCODONE HYDROCHLORIDE 5 MG/1
10 TABLET ORAL PRN
Status: DISCONTINUED | OUTPATIENT
Start: 2022-10-09 | End: 2022-10-09 | Stop reason: HOSPADM

## 2022-10-09 RX ORDER — ONDANSETRON 2 MG/ML
4 INJECTION INTRAMUSCULAR; INTRAVENOUS
Status: COMPLETED | OUTPATIENT
Start: 2022-10-09 | End: 2022-10-09

## 2022-10-09 RX ORDER — LIDOCAINE HYDROCHLORIDE 20 MG/ML
INJECTION, SOLUTION EPIDURAL; INFILTRATION; INTRACAUDAL; PERINEURAL PRN
Status: DISCONTINUED | OUTPATIENT
Start: 2022-10-09 | End: 2022-10-09 | Stop reason: SDUPTHER

## 2022-10-09 RX ORDER — METOCLOPRAMIDE HYDROCHLORIDE 5 MG/ML
10 INJECTION INTRAMUSCULAR; INTRAVENOUS
Status: DISCONTINUED | OUTPATIENT
Start: 2022-10-09 | End: 2022-10-09 | Stop reason: HOSPADM

## 2022-10-09 RX ORDER — OXYCODONE HYDROCHLORIDE 5 MG/1
5 TABLET ORAL PRN
Status: DISCONTINUED | OUTPATIENT
Start: 2022-10-09 | End: 2022-10-09 | Stop reason: HOSPADM

## 2022-10-09 RX ORDER — HYDROMORPHONE HYDROCHLORIDE 2 MG/ML
0.5 INJECTION, SOLUTION INTRAMUSCULAR; INTRAVENOUS; SUBCUTANEOUS EVERY 10 MIN PRN
Status: DISCONTINUED | OUTPATIENT
Start: 2022-10-09 | End: 2022-10-09 | Stop reason: HOSPADM

## 2022-10-09 RX ORDER — DIPHENHYDRAMINE HYDROCHLORIDE 50 MG/ML
12.5 INJECTION INTRAMUSCULAR; INTRAVENOUS
Status: DISCONTINUED | OUTPATIENT
Start: 2022-10-09 | End: 2022-10-09 | Stop reason: HOSPADM

## 2022-10-09 RX ORDER — FENTANYL CITRATE 50 UG/ML
25 INJECTION, SOLUTION INTRAMUSCULAR; INTRAVENOUS EVERY 5 MIN PRN
Status: DISCONTINUED | OUTPATIENT
Start: 2022-10-09 | End: 2022-10-09 | Stop reason: HOSPADM

## 2022-10-09 RX ORDER — ROCURONIUM BROMIDE 10 MG/ML
INJECTION, SOLUTION INTRAVENOUS PRN
Status: DISCONTINUED | OUTPATIENT
Start: 2022-10-09 | End: 2022-10-09 | Stop reason: SDUPTHER

## 2022-10-09 RX ORDER — PROPOFOL 10 MG/ML
INJECTION, EMULSION INTRAVENOUS PRN
Status: DISCONTINUED | OUTPATIENT
Start: 2022-10-09 | End: 2022-10-09 | Stop reason: SDUPTHER

## 2022-10-09 RX ADMIN — ROCURONIUM BROMIDE 50 MG: 50 INJECTION, SOLUTION INTRAVENOUS at 15:30

## 2022-10-09 RX ADMIN — SODIUM CHLORIDE, SODIUM LACTATE, POTASSIUM CHLORIDE, AND CALCIUM CHLORIDE: 600; 310; 30; 20 INJECTION, SOLUTION INTRAVENOUS at 15:25

## 2022-10-09 RX ADMIN — LIDOCAINE HYDROCHLORIDE 60 MG: 20 INJECTION, SOLUTION EPIDURAL; INFILTRATION; INTRACAUDAL; PERINEURAL at 15:30

## 2022-10-09 RX ADMIN — SODIUM CHLORIDE, SODIUM LACTATE, POTASSIUM CHLORIDE, AND CALCIUM CHLORIDE: 600; 310; 30; 20 INJECTION, SOLUTION INTRAVENOUS at 16:24

## 2022-10-09 RX ADMIN — PROPOFOL 200 MG: 10 INJECTION, EMULSION INTRAVENOUS at 15:30

## 2022-10-09 RX ADMIN — ONDANSETRON 4 MG: 2 INJECTION INTRAMUSCULAR; INTRAVENOUS at 17:57

## 2022-10-09 ASSESSMENT — ENCOUNTER SYMPTOMS
SHORTNESS OF BREATH: 0
CHOKING: 0
ABDOMINAL PAIN: 0
COUGH: 0
NAUSEA: 0
TROUBLE SWALLOWING: 1
VOICE CHANGE: 0
SORE THROAT: 0

## 2022-10-09 ASSESSMENT — PAIN - FUNCTIONAL ASSESSMENT
PAIN_FUNCTIONAL_ASSESSMENT: 0-10
PAIN_FUNCTIONAL_ASSESSMENT: 0-10

## 2022-10-09 ASSESSMENT — PAIN SCALES - GENERAL
PAINLEVEL_OUTOF10: 7
PAINLEVEL_OUTOF10: 0

## 2022-10-09 ASSESSMENT — PAIN DESCRIPTION - LOCATION: LOCATION: THROAT

## 2022-10-09 NOTE — H&P (VIEW-ONLY)
[facility] Consult Note           Referring Physician:     Consult Date: 10/9/2022    Reason for Consult: Impacted food bolus    History of Present Illness:  Patient is a 48 y.o. male who is seen in consultation for impacted food bolus. Since dinner last night. Thinks it might be chicken. Has to spit saliva into bag. History of esophageal spasms. No prior EGD. Xray neck soft tissue negative. ER note reviewed. Past Medical History:   Diagnosis Date    Borderline diabetes     Depression     Hypertension       No past surgical history on file. Family History   Problem Relation Age of Onset    Diabetes Mother     Seizures Father      Social History     Occupational History    Not on file   Tobacco Use    Smoking status: Never    Smokeless tobacco: Never   Substance and Sexual Activity    Alcohol use: Yes     Alcohol/week: 12.0 standard drinks     Types: 12 Cans of beer per week    Drug use: Never    Sexual activity: Not on file       Hospital Medications:  No current facility-administered medications for this encounter. Current Outpatient Medications   Medication Sig    LORazepam (ATIVAN) 0.5 MG tablet Take 0.5 mg by mouth as needed.     thiamine 100 MG tablet Take 1 tablet by mouth daily    nicotine (NICODERM CQ) 14 MG/24HR Place 1 patch onto the skin daily as needed (smoking cravings)    atorvastatin (LIPITOR) 80 MG tablet Take 1 tablet by mouth nightly    aspirin 81 MG EC tablet Take 1 tablet by mouth daily    tamsulosin (FLOMAX) 0.4 MG capsule Take 1 capsule by mouth daily    finasteride (PROSCAR) 5 MG tablet Take 1 tablet by mouth daily    olmesartan-hydroCHLOROthiazide (BENICAR HCT) 20-12.5 MG per tablet Take 1 tablet by mouth daily    traZODone (DESYREL) 50 MG tablet Take 100 mg by mouth at bedtime    metFORMIN (GLUCOPHAGE) 500 MG tablet Take 500 mg by mouth daily (with breakfast)    buPROPion (WELLBUTRIN XL) 300 MG extended release tablet Take 300 mg by mouth every morning Allergies: Allergies   Allergen Reactions    Cashews [Macadamia Nut Oil] Anaphylaxis    Fluoxetine Shortness Of Breath    Coconut (Cocos Nucifera) Allergy Skin Test Itching    Gabapentin      Headaches and shaking    Morphine Other (See Comments)     Other reaction(s): Other- (not listed) - Allergy      Penicillins Hives    Propoxyphene Other (See Comments)     Other reaction(s): Other- (not listed) - Allergy      Sertraline Hives    Coconut Fatty Acids Hives and Rash     Other reaction(s): Hives/Swelling-Allergy  Other reaction(s): Hives/Swelling-Allergy  Other reaction(s): Rash-Allergy  Other reaction(s): Hives/Swelling-Allergy         Review of Systems:  A comprehensive review of systems was negative except for that written in the History of Present Illness. Objective:     Physical Exam:  Vitals:  Visit Vitals  /80   Pulse 84   Temp 98.1 °F (36.7 °C) (Oral)   Resp 18   SpO2 98%       General: No acute distress. Skin:  Extremities and face reveal no rashes. No pringle erythema. No telangiectasias on the chest wall. HEENT: Sclerae anicteric. No oral ulcers. No abnormal pigmentation of the lips. The neck is supple. Cardiovascular: Regular rate and rhythm. No murmurs, gallops, or rubs. Respiratory:  Comfortable breathing  With no accessory muscle use. Clear breath sounds with no wheezes, rales, or rhonchi. GI:  Abdomen nondistended, soft, and nontender. Normal active bowel sounds. No enlargement of the liver or spleen. No masses palpable. Musculoskeletal:  No pitting edema of the lower legs. Extremities have good range of motion. Neurological:  Gross memory appears intact. Patient is alert and oriented. Psychiatric:  Mood appears appropriate with judgement intact. Lymphatic:  No cervical or supraclavicular adenopathy. Laboratory:    No results for input(s): WBC, RBC, HGB, HCT, PLT in the last 72 hours.    No results for input(s): GLU, NA, K, CL, CO2, BUN, CREA, CA in the last 72 hours. No results for input(s): INR, APTT in the last 72 hours. Invalid input(s): PTP  No results for input(s): ALB, TP, AML in the last 72 hours. Invalid input(s): TBIL, CBIL, SGOT, GPT, AP, LPSE    Assessment:       A 48 y.o. male with impacted food bolus      Plan:       EGD    Signed By: Teofilo Duque MD     October 9, 2022

## 2022-10-09 NOTE — ED NOTES
Report given to Teresa DENTON at [facility] regarding patient.      Brittney Kenney RN  10/17/22 [id number]

## 2022-10-09 NOTE — ED TRIAGE NOTES
Pt arrives via [facility] from [facility] ED. Pt complains of trouble swallowing onset last PM. Hx of esophageal spasms. Pt was sent to this facility to be seen by GI.

## 2022-10-09 NOTE — PROCEDURES
Endoscopic Gastroduodenoscopy Procedure Note    Indications: Impacted food bolus    Anesthesia/Sedation: MAC IV     Pre-Procedure Physical:    No current facility-administered medications for this encounter. Current Outpatient Medications   Medication Sig    LORazepam (ATIVAN) 0.5 MG tablet Take 0.5 mg by mouth as needed. thiamine 100 MG tablet Take 1 tablet by mouth daily    nicotine (NICODERM CQ) 14 MG/24HR Place 1 patch onto the skin daily as needed (smoking cravings)    atorvastatin (LIPITOR) 80 MG tablet Take 1 tablet by mouth nightly    aspirin 81 MG EC tablet Take 1 tablet by mouth daily    tamsulosin (FLOMAX) 0.4 MG capsule Take 1 capsule by mouth daily    finasteride (PROSCAR) 5 MG tablet Take 1 tablet by mouth daily    olmesartan-hydroCHLOROthiazide (BENICAR HCT) 20-12.5 MG per tablet Take 1 tablet by mouth daily    traZODone (DESYREL) 50 MG tablet Take 100 mg by mouth at bedtime    metFORMIN (GLUCOPHAGE) 500 MG tablet Take 500 mg by mouth daily (with breakfast)    buPROPion (WELLBUTRIN XL) 300 MG extended release tablet Take 300 mg by mouth every morning      Cashews [macadamia nut oil], Fluoxetine, Coconut (cocos nucifera) allergy skin test, Gabapentin, Morphine, Penicillins, Propoxyphene, Sertraline, and Coconut fatty acids    Patient Vitals for the past 8 hrs:   BP Temp Temp src Pulse Resp SpO2   10/09/22 1424 116/80 98.1 °F (36.7 °C) Oral 84 18 98 %       Exam      Airway: clear   Heart: normal S1and S2    Lungs: clear bilateral  Abdomen: soft, nontender, bowel sounds present and normal in all quads   Mental Status: awake, alert and oriented to person, place and time          Procedure Details     Informed consent was obtained for the procedure, including conscious sedation. Risks of pancreatitis, infection, perforation, hemorrhage, adverse drug reaction and aspiration were discussed. The patient was placed in the left lateral decubitus position.   Based on the pre-procedure assessment, including review of the patient's medical history, medications, allergies, and review of systems, he had been deemed to be an appropriate candidate for conscious sedation; he was therefore sedated with the medications listed below. He was monitored continuously with ECG tracing, pulse oximetry, blood pressure monitoring, and direct observation. The EGD gastroscope was inserted into the mouth and advanced under direct vision to the second portion of the duodenum. A careful inspection was made as the gastroscope was withdrawn, including a retroflexed view of the proximal stomach; findings and interventions are described below. Appropriate photodocumentation was obtained. Findings:   Esophagus- Impacted food bolus of chicken in distal esophagus. Very hard to get out. Took 1 hour of using snare, three pronged grasper, and rat tooth forceps. Stomach- Normal.  Duodenum- Normal.    Therapies: Foreign body removal    Specimens: None    Estimated Blood Loss: 0 cc           Complications:   None; patient tolerated the procedure well. Attending Attestation:  I performed the procedure. Impression:    Impacted food bolus removed. Recommendations:  EGD in 2-4 wks to dilate esophagus.

## 2022-10-09 NOTE — ED NOTES
Called spoke with [facility] patient transfer will be coming around 14:00. She acknowledged.       Tana Mitchell RN  10/09/22 [id number]

## 2022-10-09 NOTE — ED NOTES
Upon assessment, pt states difficulty swallowing began last night while eating dinner. Pt states he feels like something is stuck in his esophagus. PT has had previous issues with this and had a swallow test, but has not had a scope.       Carmen Galvan RN  10/09/22 1014

## 2022-10-09 NOTE — ANESTHESIA POSTPROCEDURE EVALUATION
Department of Anesthesiology  Postprocedure Note    Patient: Shamika Herrera  MRN: [id number]  YOB: 1975  Date of evaluation: 10/9/2022      Procedure Summary     Date: 10/09/22 Room / Location: [facility] ENDO FLOURO 1 / [facility] ENDOSCOPY    Anesthesia Start: 0110 Anesthesia Stop: 7213    Procedure: EGD ESOPHAGOGASTRODUODENOSCOPY foreign body removal (Upper GI Region) Diagnosis:       Food bolus obstruction of intestine ([address]      (FOOD BOLUS)    Surgeons: James Quach MD Responsible Provider: Lana Martinez MD    Anesthesia Type: general ASA Status: 3 - Emergent          Anesthesia Type: No value filed.     Jaiden Phase I: Jaiden Score: 10    Jaiden Phase II:        Anesthesia Post Evaluation    Patient location during evaluation: PACU  Patient participation: complete - patient participated  Level of consciousness: awake and awake and alert  Airway patency: patent  Nausea & Vomiting: no nausea  Complications: no  Cardiovascular status: hemodynamically stable  Respiratory status: acceptable  Hydration status: euvolemic  Multimodal analgesia pain management approach

## 2022-10-09 NOTE — CONSULTS
[facility] Consult Note           Referring Physician:     Consult Date: 10/9/2022    Reason for Consult: Impacted food bolus    History of Present Illness:  Patient is a 48 y.o. male who is seen in consultation for impacted food bolus. Since dinner last night. Thinks it might be chicken. Has to spit saliva into bag. History of esophageal spasms. No prior EGD. Xray neck soft tissue negative. ER note reviewed. Past Medical History:   Diagnosis Date    Borderline diabetes     Depression     Hypertension       No past surgical history on file. Family History   Problem Relation Age of Onset    Diabetes Mother     Seizures Father      Social History     Occupational History    Not on file   Tobacco Use    Smoking status: Never    Smokeless tobacco: Never   Substance and Sexual Activity    Alcohol use: Yes     Alcohol/week: 12.0 standard drinks     Types: 12 Cans of beer per week    Drug use: Never    Sexual activity: Not on file       Hospital Medications:  No current facility-administered medications for this encounter. Current Outpatient Medications   Medication Sig    LORazepam (ATIVAN) 0.5 MG tablet Take 0.5 mg by mouth as needed.     thiamine 100 MG tablet Take 1 tablet by mouth daily    nicotine (NICODERM CQ) 14 MG/24HR Place 1 patch onto the skin daily as needed (smoking cravings)    atorvastatin (LIPITOR) 80 MG tablet Take 1 tablet by mouth nightly    aspirin 81 MG EC tablet Take 1 tablet by mouth daily    tamsulosin (FLOMAX) 0.4 MG capsule Take 1 capsule by mouth daily    finasteride (PROSCAR) 5 MG tablet Take 1 tablet by mouth daily    olmesartan-hydroCHLOROthiazide (BENICAR HCT) 20-12.5 MG per tablet Take 1 tablet by mouth daily    traZODone (DESYREL) 50 MG tablet Take 100 mg by mouth at bedtime    metFORMIN (GLUCOPHAGE) 500 MG tablet Take 500 mg by mouth daily (with breakfast)    buPROPion (WELLBUTRIN XL) 300 MG extended release tablet Take 300 mg by mouth every morning Allergies: Allergies   Allergen Reactions    Cashews [Macadamia Nut Oil] Anaphylaxis    Fluoxetine Shortness Of Breath    Coconut (Cocos Nucifera) Allergy Skin Test Itching    Gabapentin      Headaches and shaking    Morphine Other (See Comments)     Other reaction(s): Other- (not listed) - Allergy      Penicillins Hives    Propoxyphene Other (See Comments)     Other reaction(s): Other- (not listed) - Allergy      Sertraline Hives    Coconut Fatty Acids Hives and Rash     Other reaction(s): Hives/Swelling-Allergy  Other reaction(s): Hives/Swelling-Allergy  Other reaction(s): Rash-Allergy  Other reaction(s): Hives/Swelling-Allergy         Review of Systems:  A comprehensive review of systems was negative except for that written in the History of Present Illness. Objective:     Physical Exam:  Vitals:  Visit Vitals  /80   Pulse 84   Temp 98.1 °F (36.7 °C) (Oral)   Resp 18   SpO2 98%       General: No acute distress. Skin:  Extremities and face reveal no rashes. No pringle erythema. No telangiectasias on the chest wall. HEENT: Sclerae anicteric. No oral ulcers. No abnormal pigmentation of the lips. The neck is supple. Cardiovascular: Regular rate and rhythm. No murmurs, gallops, or rubs. Respiratory:  Comfortable breathing  With no accessory muscle use. Clear breath sounds with no wheezes, rales, or rhonchi. GI:  Abdomen nondistended, soft, and nontender. Normal active bowel sounds. No enlargement of the liver or spleen. No masses palpable. Musculoskeletal:  No pitting edema of the lower legs. Extremities have good range of motion. Neurological:  Gross memory appears intact. Patient is alert and oriented. Psychiatric:  Mood appears appropriate with judgement intact. Lymphatic:  No cervical or supraclavicular adenopathy. Laboratory:    No results for input(s): WBC, RBC, HGB, HCT, PLT in the last 72 hours.    No results for input(s): GLU, NA, K, CL, CO2, BUN, CREA, CA in the last 72 hours. No results for input(s): INR, APTT in the last 72 hours. Invalid input(s): PTP  No results for input(s): ALB, TP, AML in the last 72 hours. Invalid input(s): TBIL, CBIL, SGOT, GPT, AP, LPSE    Assessment:       A 48 y.o. male with impacted food bolus      Plan:       EGD    Signed By: Judy Duffy.  MD Dunia     October 9, 2022

## 2022-10-09 NOTE — PERIOP NOTE
Discharge instructions reviewed with pt and family member Chi Christian (wife) who verbalize understanding of follow up care.

## 2022-10-09 NOTE — DISCHARGE INSTRUCTIONS
Gastrointestinal Esophagogastroduodenoscopy (EGD)/ Endoscopic Ultrasound(EUS)- Upper Exam Discharge Instructions    1. Call Dr. Samantha Gamboa at [phone]  for any problems or questions. 2. Contact the doctor's office for follow up appointment as directed. 3. Medication may cause drowsiness for several hours, therefore, do not drive or operate machinery for remainder of the day. 4. No alcohol today. 5. Do not make any important decisions such as signing legal paperwork. 6. Ordinarily, you may resume regular diet and activity after exam unless otherwise specified by your physician. 7. For mild soreness in your throat you may use Cepacol throat lozenges or warm  salt-water gargles as needed. Any additional instructions:   Clear liquids tonight, soft diet tomorrow  Follow up with dr Reid Veras office for repeat EGD to dilate esophagus in 2 to 4 weeks        Instructions given to Toma Brizuela and other family members.

## 2022-10-09 NOTE — ANESTHESIA PRE PROCEDURE
Department of Anesthesiology  Preprocedure Note       Name:  Vaishali Guerrero   Age:  48 y.o.  :  1975                                          MRN:  [id number]         Date:  10/9/2022      Surgeon: Kyra Prado):  Camilo Lima MD    Procedure: Procedure(s):  EGD ESOPHAGOGASTRODUODENOSCOPY    Medications prior to admission:   Prior to Admission medications    Medication Sig Start Date End Date Taking? Authorizing Provider   LORazepam (ATIVAN) 0.5 MG tablet Take 0.5 mg by mouth as needed. 22   Historical Provider, MD   thiamine 100 MG tablet Take 1 tablet by mouth daily 22   Lissy Naik MD   nicotine (NICODERM CQ) 14 MG/24HR Place 1 patch onto the skin daily as needed (smoking cravings) 22   Lissy Naik MD   atorvastatin (LIPITOR) 80 MG tablet Take 1 tablet by mouth nightly 22   Lissy Naik MD   aspirin 81 MG EC tablet Take 1 tablet by mouth daily 22   Lissy Naik MD   tamsulosin [facility]) 0.4 MG capsule Take 1 capsule by mouth daily 22   Lissy Naik MD   finasteride (PROSCAR) 5 MG tablet Take 1 tablet by mouth daily 22   Lissy Naik MD   olmesartan-hydroCHLOROthiazide (BENICAR HCT) 20-12.5 MG per tablet Take 1 tablet by mouth daily 21   Historical Provider, MD   traZODone (DESYREL) 50 MG tablet Take 100 mg by mouth at bedtime 22   Historical Provider, MD   metFORMIN (GLUCOPHAGE) 500 MG tablet Take 500 mg by mouth daily (with breakfast)    Historical Provider, MD   buPROPion (WELLBUTRIN XL) 300 MG extended release tablet Take 300 mg by mouth every morning    Historical Provider, MD       Current medications:    No current facility-administered medications for this encounter. Current Outpatient Medications   Medication Sig Dispense Refill    LORazepam (ATIVAN) 0.5 MG tablet Take 0.5 mg by mouth as needed.       thiamine 100 MG tablet Take 1 tablet by mouth daily 30 tablet 3    nicotine (NICODERM CQ) 14 MG/24HR Place 1 patch onto the skin daily as needed (smoking cravings) 30 patch 3    atorvastatin (LIPITOR) 80 MG tablet Take 1 tablet by mouth nightly 30 tablet 3    aspirin 81 MG EC tablet Take 1 tablet by mouth daily 30 tablet 3    tamsulosin (FLOMAX) 0.4 MG capsule Take 1 capsule by mouth daily 90 capsule 1    finasteride (PROSCAR) 5 MG tablet Take 1 tablet by mouth daily 30 tablet 3    olmesartan-hydroCHLOROthiazide (BENICAR HCT) 20-12.5 MG per tablet Take 1 tablet by mouth daily      traZODone (DESYREL) 50 MG tablet Take 100 mg by mouth at bedtime      metFORMIN (GLUCOPHAGE) 500 MG tablet Take 500 mg by mouth daily (with breakfast)      buPROPion (WELLBUTRIN XL) 300 MG extended release tablet Take 300 mg by mouth every morning         Allergies: Allergies   Allergen Reactions    Cashews [Macadamia Nut Oil] Anaphylaxis    Fluoxetine Shortness Of Breath    Coconut (Cocos Nucifera) Allergy Skin Test Itching    Gabapentin      Headaches and shaking    Morphine Other (See Comments)     Other reaction(s): Other- (not listed) - Allergy      Penicillins Hives    Propoxyphene Other (See Comments)     Other reaction(s): Other- (not listed) - Allergy      Sertraline Hives    Coconut Fatty Acids Hives and Rash     Other reaction(s): Hives/Swelling-Allergy  Other reaction(s): Hives/Swelling-Allergy  Other reaction(s): Rash-Allergy  Other reaction(s): Hives/Swelling-Allergy         Problem List:    Patient Active Problem List   Diagnosis Code    [facility] I63.9    Hypertension I10    Borderline diabetes R73.03    Depression F32. A    Alcohol withdrawal syndrome without complication ([facility]) J94.643    Left sided numbness R20.0    Acute cystitis without hematuria N30.00       Past Medical History:        Diagnosis Date    Borderline diabetes     Depression     Hypertension        Past Surgical History:  No past surgical history on file.     Social History:    Social History     Tobacco Use    Smoking status: Never    Smokeless tobacco: Never   Substance Use Topics    Alcohol use: Yes     Alcohol/week: 12.0 standard drinks     Types: 12 Cans of beer per week                                Counseling given: Not Answered      Vital Signs (Current): There were no vitals filed for this visit. BP Readings from Last 3 Encounters:   10/09/22 117/89   09/22/22 (!) 90/56   09/19/22 101/78       NPO Status:                                                                                 BMI:   Wt Readings from Last 3 Encounters:   10/09/22 185 lb (83.9 kg)   09/22/22 188 lb 4.4 oz (85.4 kg)   09/19/22 182 lb (82.6 kg)     There is no height or weight on file to calculate BMI.    CBC:   Lab Results   Component Value Date/Time    WBC 11.3 09/21/2022 03:25 AM    RBC 4.98 09/21/2022 03:25 AM    HGB 16.2 09/21/2022 03:25 AM    HCT 45.9 09/21/2022 03:25 AM    MCV 92.2 09/21/2022 03:25 AM    RDW 11.9 09/21/2022 03:25 AM     09/21/2022 03:25 AM       CMP:   Lab Results   Component Value Date/Time     09/22/2022 03:29 AM    K 3.7 09/22/2022 03:29 AM     09/22/2022 03:29 AM    CO2 28 09/22/2022 03:29 AM    BUN 15 09/22/2022 03:29 AM    CREATININE 1.10 09/22/2022 03:29 AM    GFRAA >60 09/22/2022 03:29 AM    LABGLOM >60 09/22/2022 03:29 AM    GLUCOSE 128 09/22/2022 03:29 AM    PROT 6.9 09/19/2022 09:44 AM    CALCIUM 8.8 09/22/2022 03:29 AM    BILITOT 0.5 09/19/2022 09:44 AM    ALKPHOS 73 09/19/2022 09:44 AM    AST 27 09/19/2022 09:44 AM    ALT 28 09/19/2022 09:44 AM       POC Tests: No results for input(s): POCGLU, POCNA, POCK, POCCL, POCBUN, POCHEMO, POCHCT in the last 72 hours.     Coags:   Lab Results   Component Value Date/Time    PROTIME 13.4 09/19/2022 09:44 AM    INR 1.0 09/19/2022 09:44 AM       HCG (If Applicable): No results found for: PREGTESTUR, PREGSERUM, HCG, HCGQUANT     ABGs: No results found for: PHART, PO2ART, AEB4NIQ, RKI5FPM, BEART, X5UXHYJH Type & Screen (If Applicable):  No results found for: LABABO, LABRH    Drug/Infectious Status (If Applicable):  No results found for: HIV, HEPCAB    COVID-19 Screening (If Applicable): No results found for: COVID19        Anesthesia Evaluation  Patient summary reviewed and Nursing notes reviewed  Airway: Mallampati: II  TM distance: >3 FB   Neck ROM: full     Dental:    (+) lower dentures and upper dentures      Pulmonary:Negative Pulmonary ROS and normal exam                               Cardiovascular:  Exercise tolerance: good (>4 METS),   (+) hypertension:,         Rhythm: regular  Rate: normal                    Neuro/Psych:   Negative Neuro/Psych ROS  (+) CVA:,             GI/Hepatic/Renal: Neg GI/Hepatic/Renal ROS            Endo/Other: Negative Endo/Other ROS   (+) DiabetesType II DM, , .          Pt had no PAT visit       Abdominal:             Vascular: negative vascular ROS. 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 MIPS: Postoperative opioids intended and Prophylactic antiemetics administered. Anesthetic plan and risks discussed with patient.       Plan discussed with surgical team.                    Kai Munguia MD   10/9/2022

## 2022-10-09 NOTE — ED PROVIDER NOTES
[facility] Emergency Department Provider Note                   PCP:                PEDRO Hugo NP               Age: 48 y.o. Sex: male       Maricruz Yang is a 48 y.o. male who presents to the Emergency Department with chief complaint of  No chief complaint on file. Patient states that he has a history of swallowing difficulty. He states occasionally food will get stuck in his esophagus. He has seen a GI doctor before and been told that his esophagus does not squeeze right. He states normally he is able to vomit and the food becomes unstuck. Patient states yesterday he was eating chicken when he felt something lodged in his lower throat and upper chest.  Patient states he is not any having trouble breathing or change in voice. He states that every time he tries to drink any fluids it sits in the back of his throat and then comes back out. Patient is tolerating his secretions. Patient denies any abdominal pain. Patient did have a stroke several weeks ago but states this did not affect his swallowing. The history is provided by the patient. All other systems reviewed and are negative. Review of Systems   HENT:  Positive for trouble swallowing. Negative for sore throat and voice change. Respiratory:  Negative for cough, choking and shortness of breath. Cardiovascular:  Positive for chest pain. Gastrointestinal:  Negative for abdominal pain and nausea. Past Medical History:   Diagnosis Date    Borderline diabetes     Depression     Hypertension         History reviewed. No pertinent surgical history.      Family History   Problem Relation Age of Onset    Diabetes Mother     Seizures Father         Social History     Socioeconomic History    Marital status:      Spouse name: None    Number of children: None    Years of education: None    Highest education level: None   Tobacco Use    Smoking status: Never    Smokeless tobacco: Never   Substance and Sexual Activity    Alcohol use: Yes     Alcohol/week: 12.0 standard drinks     Types: 12 Cans of beer per week    Drug use: Never        Allergies: Cashews [macadamia nut oil], Fluoxetine, Coconut (cocos nucifera) allergy skin test, Gabapentin, Morphine, Penicillins, Propoxyphene, Sertraline, and Coconut fatty acids    Discharge Medication List as of 10/9/2022  1:40 PM        CONTINUE these medications which have NOT CHANGED    Details   LORazepam (ATIVAN) 0.5 MG tablet Take 0.5 mg by mouth as needed. Historical Med      thiamine 100 MG tablet Take 1 tablet by mouth daily, Disp-30 tablet, R-3Normal      nicotine (NICODERM CQ) 14 MG/24HR Place 1 patch onto the skin daily as needed (smoking cravings), Disp-30 patch, R-3Normal      atorvastatin (LIPITOR) 80 MG tablet Take 1 tablet by mouth nightly, Disp-30 tablet, R-3Normal      aspirin 81 MG EC tablet Take 1 tablet by mouth daily, Disp-30 tablet, R-3Normal      tamsulosin (FLOMAX) 0.4 MG capsule Take 1 capsule by mouth daily, Disp-90 capsule, R-1Normal      finasteride (PROSCAR) 5 MG tablet Take 1 tablet by mouth daily, Disp-30 tablet, R-3Normal      olmesartan-hydroCHLOROthiazide (BENICAR HCT) 20-12.5 MG per tablet Take 1 tablet by mouth dailyHistorical Med      traZODone (DESYREL) 50 MG tablet Take 100 mg by mouth at bedtimeHistorical Med      metFORMIN (GLUCOPHAGE) 500 MG tablet Take 500 mg by mouth daily (with breakfast)Historical Med      buPROPion (WELLBUTRIN XL) 300 MG extended release tablet Take 300 mg by mouth every morningHistorical Med              Vitals signs and nursing note reviewed. Patient Vitals for the past 4 hrs:   Pulse Resp BP SpO2   10/09/22 1332 79 18 117/89 97 %          Physical Exam  Vitals and nursing note reviewed. Constitutional:       Appearance: Normal appearance. HENT:      Head: Normocephalic and atraumatic. Mouth/Throat:      Mouth: Mucous membranes are moist.      Pharynx: Oropharynx is clear.    Neck:      Trachea: Trachea and phonation normal.      Comments: Patient is tolerating his secretions  Cardiovascular:      Rate and Rhythm: Normal rate and regular rhythm. Heart sounds: Normal heart sounds. Pulmonary:      Effort: Pulmonary effort is normal.      Breath sounds: Normal breath sounds. Abdominal:      General: Abdomen is flat. Palpations: Abdomen is soft. Tenderness: There is no abdominal tenderness. Musculoskeletal:      Cervical back: Full passive range of motion without pain, normal range of motion and neck supple. Neurological:      Mental Status: He is alert. Orders Placed This Encounter   Procedures    XR CHEST (2 VW)    XR NECK SOFT TISSUE       Procedures        Results Include:    No results found for this or any previous visit (from the past 24 hour(s)). XR CHEST (2 VW)   Final Result   Minimal linear scarring/atelectasis in the right lung base         XR NECK SOFT TISSUE   Final Result   Negative soft tissue neck                         ED Course as of 10/09/22 1625   Sun Oct 09, 2022   1134 I have messaged Dr. Sandra Fairchild of gastroenterology and he has requested patient be transferred to the [address] [place] [address] emergency department so that he can perform an endoscopy. Patient was notified of the plan and is agreeable. [CW]      ED Course User Index  [CW] Azra Bah MD       MDM  Number of Diagnoses or Management Options  Esophageal obstruction due to food impaction  Diagnosis management comments: Patient presenting for dysphagia which appears to be from esophageal food obstruction. Patient's chest and soft tissue neck x-ray showed no acute findings. Patient had no signs of airway obstruction and he was tolerating his secretions. Patient did attempt to drink fluid in the ED but was unsuccessful and had to spit it back up.   I consulted with gastroenterologist and patient was transferred to [address] [place] [address] for an EGD because the gastroenterologist do not come to our ER. Explanation: The diagnosis and plan as well as the results of any testing and treatments today in the department were communicated to the patient and/or their family/caregiver (if present). The patient/caregiver verbalized understanding and realize that they are being admitted to the hospital for further evaluation and treatment. All questions were answered at bedside. ICD-10-CM    1. Esophageal obstruction due to food impaction  K22.2     T18.128A           DISPOSITION Decision To Transfer 10/09/2022 11:35:07 AM       Voice dictation software was used during the making of this note. This software is not perfect and grammatical and other typographical errors may be present. This note has not been completely proofread for errors.      Debora Tian MD  10/09/22 2663

## 2022-10-09 NOTE — ED TRIAGE NOTES
\"I had a stroke on the 19th and have been having some issues. I ate last night and ate some chicken and  I can't hold down anything to drink.  I have had this issue before my stroke in the past.\" Pt is able to talk in complete sentences and hasnt' needed to split into a bag during triage

## 2022-10-24 ENCOUNTER — OFFICE VISIT (OUTPATIENT)
Dept: NEUROLOGY | Age: 47
End: 2022-10-24
Payer: COMMERCIAL

## 2022-10-24 VITALS
WEIGHT: 182.2 LBS | HEIGHT: 71 IN | DIASTOLIC BLOOD PRESSURE: 97 MMHG | HEART RATE: 115 BPM | BODY MASS INDEX: 25.51 KG/M2 | OXYGEN SATURATION: 98 % | SYSTOLIC BLOOD PRESSURE: 131 MMHG

## 2022-10-24 DIAGNOSIS — F10.10 ETOH ABUSE: ICD-10-CM

## 2022-10-24 DIAGNOSIS — Z87.891 FORMER TOBACCO USE: ICD-10-CM

## 2022-10-24 DIAGNOSIS — R25.1 TREMOR OF BOTH HANDS: ICD-10-CM

## 2022-10-24 DIAGNOSIS — Z09 HOSPITAL DISCHARGE FOLLOW-UP: Primary | ICD-10-CM

## 2022-10-24 DIAGNOSIS — R13.10 DYSPHAGIA, UNSPECIFIED TYPE: ICD-10-CM

## 2022-10-24 DIAGNOSIS — F32.A ANXIETY AND DEPRESSION: ICD-10-CM

## 2022-10-24 DIAGNOSIS — I65.1 BASILAR ARTERY OCCLUSION: ICD-10-CM

## 2022-10-24 DIAGNOSIS — R26.81 UNSTEADY GAIT: ICD-10-CM

## 2022-10-24 DIAGNOSIS — R53.1 LEFT-SIDED WEAKNESS: ICD-10-CM

## 2022-10-24 DIAGNOSIS — I63.9 STROKE ABORTED BY ADMINISTRATION OF THROMBOLYTIC AGENT (HCC): ICD-10-CM

## 2022-10-24 DIAGNOSIS — F41.9 ANXIETY AND DEPRESSION: ICD-10-CM

## 2022-10-24 PROBLEM — R29.898 LEFT ARM WEAKNESS: Status: ACTIVE | Noted: 2022-10-24

## 2022-10-24 PROBLEM — R47.1 DYSARTHRIA: Status: ACTIVE | Noted: 2022-10-24

## 2022-10-24 PROBLEM — G51.0 UNILATERAL FACIAL PARESIS: Status: ACTIVE | Noted: 2022-10-24

## 2022-10-24 PROBLEM — E66.3 OVERWEIGHT WITH BODY MASS INDEX (BMI) 25.0-29.9: Status: ACTIVE | Noted: 2022-02-23

## 2022-10-24 PROBLEM — E78.49 OTHER HYPERLIPIDEMIA: Status: ACTIVE | Noted: 2022-10-24

## 2022-10-24 PROBLEM — R29.898 DECREASED GRIP STRENGTH: Status: ACTIVE | Noted: 2022-10-24

## 2022-10-24 PROBLEM — R41.841 COGNITIVE COMMUNICATION DEFICIT: Status: ACTIVE | Noted: 2022-10-24

## 2022-10-24 PROCEDURE — 99204 OFFICE O/P NEW MOD 45 MIN: CPT | Performed by: NURSE PRACTITIONER

## 2022-10-24 PROCEDURE — 1111F DSCHRG MED/CURRENT MED MERGE: CPT | Performed by: NURSE PRACTITIONER

## 2022-10-24 RX ORDER — CLOPIDOGREL BISULFATE 75 MG/1
75 TABLET ORAL DAILY
Qty: 90 TABLET | Refills: 1 | Status: SHIPPED | OUTPATIENT
Start: 2022-10-24

## 2022-10-24 RX ORDER — OLMESARTAN MEDOXOMIL 20 MG/1
20 TABLET ORAL DAILY
COMMUNITY

## 2022-10-24 RX ORDER — LANOLIN ALCOHOL/MO/W.PET/CERES
100 CREAM (GRAM) TOPICAL DAILY
Qty: 90 TABLET | Refills: 3 | Status: SHIPPED | OUTPATIENT
Start: 2022-10-24

## 2022-10-24 RX ORDER — METFORMIN HYDROCHLORIDE 500 MG/1
TABLET, EXTENDED RELEASE ORAL
COMMUNITY
Start: 2022-10-13

## 2022-10-24 NOTE — PROGRESS NOTES
[facility] [address]  [address] [facility], [address] [facility]      Chief Complaint   Patient presents with    Follow-Up from Hospital    Cerebrovascular Accident       Steve Mcrae is a 48 y.o. male who presents for hospital follow up stroke. PMH significant for prediabetes, HTN, anxiety/depression, ETOH abuse and tobacco abuse who presented to ED on 9/19/2022 with left sided numbness. Code S was initiated. He was evaluated by teleneurology. NIHSS was 2. CT of head negative for acute intracranial abnormality. 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 Images were reviewed by [facility],  images were reviewed and felt to be chronic. He was not candidate for ROBI. Tenecteplase was given. He was subsequently admitted to ICU. MRI of brain negative for acute infarct. EEG normal. TTE EF 55-60% negative for PFO or LAD. EKG NSR. UDS + amphetamines. UA +, treated with cipro. Treated with 5 days of librium given daily ETOH abuse. He was evaluated by PT/OT/St. He was discharged home. Interval history:    He is here today by himself. 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 He continues to endorse left sided weakness/sensory changes, difficulty with concentration/focusing, and BUE tremor. He was recently seen in ED on 10/9 for dysphagia, evaluated by GI s/p EGD Impacted food bolus of chicken in distal esophagus. Plan for esophogeal dilation as outpatient. He is currently participating in outpatient therapies. He is ambulating with cane. He is independent of his ADLs. He is currently taking ASA 81 mg daily and atorvastatin 80 mg daily for secondary stroke prevention. He continues to endorse drinking 1-2 beers every other day, sometimes daily. He endorses daily drinking since he was 10years old. He is continuing to work on quitting. He is currently taking thiamine 100 mg daily. He is a former tobacco use, stated he quit over 20 years ago. He is endorses hx of anxiety and depression- Stable. denies SI. Currently taking wellbutrin and trazadone. He was recently seen by PCP on 9/26  treated for otalgia of left and treated for presumed ear infection. He was noted to be hypotensive with SBP 80s, Proscar was discontinued and he was transitioned to flomax. BP medications were recently adjusted, Hctz was discontinued. Past Medical History:   Diagnosis Date    Borderline diabetes     Depression     Hypertension        Past Surgical History:   Procedure Laterality Date    UPPER GASTROINTESTINAL ENDOSCOPY N/A 10/9/2022    EGD ESOPHAGOGASTRODUODENOSCOPY foreign body removal performed by Deana Martins MD at [facility] ENDOSCOPY       Family History   Problem Relation Age of Onset    Diabetes Mother     Seizures Father        Social History     Socioeconomic History    Marital status:      Spouse name: None    Number of children: None    Years of education: None    Highest education level: None   Tobacco Use    Smoking status: Never    Smokeless tobacco: Never   Vaping Use    Vaping Use: Never used   Substance and Sexual Activity    Alcohol use: Yes     Alcohol/week: 12.0 standard drinks     Types: 12 Cans of beer per week    Drug use: Never    Sexual activity: Yes     Partners: Female         Current Outpatient Medications:     metFORMIN (GLUCOPHAGE-XR) 500 MG extended release tablet, TAKE 1 TABLET BY MOUTH ONCE DAILY WITH BREAKFAST PREDIABETES, Disp: , Rfl:     clopidogrel (PLAVIX) 75 MG tablet, Take 1 tablet by mouth daily, Disp: 90 tablet, Rfl: 1    thiamine 100 MG tablet, Take 1 tablet by mouth daily, Disp: 90 tablet, Rfl: 3    olmesartan (BENICAR) 20 MG tablet, Take 20 mg by mouth daily, Disp: , Rfl:     LORazepam (ATIVAN) 0.5 MG tablet, Take 0.5 mg by mouth as needed. , Disp: , Rfl:     atorvastatin (LIPITOR) 80 MG tablet, Take 1 tablet by mouth nightly, Disp: 30 tablet, Rfl: 3    aspirin 81 MG EC tablet, Take 1 tablet by mouth daily, Disp: 30 tablet, Rfl: 3    tamsulosin (FLOMAX) 0.4 MG capsule, Take 1 capsule by mouth daily, Disp: 90 capsule, Rfl: 1    traZODone (DESYREL) 50 MG tablet, Take 100 mg by mouth at bedtime, Disp: , Rfl:     buPROPion (WELLBUTRIN XL) 300 MG extended release tablet, Take 300 mg by mouth every morning, Disp: , Rfl:     Allergies   Allergen Reactions    Cashews [Macadamia Nut Oil] Anaphylaxis    Fluoxetine Shortness Of Breath    Coconut (Cocos Nucifera) Allergy Skin Test Itching    Gabapentin      Headaches and shaking    Morphine Other (See Comments)     Other reaction(s): Other- (not listed) - Allergy      Penicillins Hives    Propoxyphene Other (See Comments)     Other reaction(s): Other- (not listed) - Allergy      Sertraline Hives    Coconut Fatty Acids Hives and Rash     Other reaction(s): Hives/Swelling-Allergy  Other reaction(s): Hives/Swelling-Allergy  Other reaction(s): Rash-Allergy  Other reaction(s): Hives/Swelling-Allergy         REVIEW OF SYSTEMS:  CONSTITUTIONAL: No weight loss, fever, chills, weakness or fatigue. HEENT: Eyes: No visual loss, blurred vision, double vision or yellow sclerae. Ears, Nose, Throat: No hearing loss, sneezing, congestion, runny nose or sore throat. SKIN: No rash or itching. CARDIOVASCULAR: No chest pain, chest pressure or chest discomfort. No palpitations or edema. RESPIRATORY: No shortness of breath, cough or sputum. GASTROINTESTINAL: No anorexia, nausea, vomiting or diarrhea. No abdominal pain or blood. GENITOURINARY: no burning with urination. NEUROLOGICAL: left sided weakness/sensory changes, BUE tremors, gait imbalance No headache, dizziness, syncope, paralysis, ataxia, numbness or tingling in the extremities. No change in bowel or bladder control. MUSCULOSKELETAL: No muscle, back pain, joint pain or stiffness. HEMATOLOGIC: No anemia, bleeding or bruising. LYMPHATICS: No enlarged nodes.  No history of splenectomy. PSYCHIATRIC: history of depression or anxiety. ENDOCRINOLOGIC: No reports of sweating, cold or heat intolerance. No polyuria or polydipsia. ALLERGIES: No history of asthma, hives, eczema or rhinitis. Physical Examination  BP (!) 131/97 (Site: Left Upper Arm, Position: Sitting, Cuff Size: Medium Adult)   Pulse (!) 115   Ht 5' 11\" (1.803 m)   Wt 182 lb 3.2 oz (82.6 kg)   SpO2 98%   BMI 25.41 kg/m²     General - Well developed, well nourished, in no apparent distress. Pleasant and conversant. HEENT - Normocephalic, atraumatic. Conjunctiva, tympanic membranes, and oropharynx are clear. Neck - Supple without masses  Extremities - Peripheral pulses intact. No edema and no rashes. Neurological examination - Comprehension, attention, memory and reasoning are intact. Language and speech are normal.   On cranial nerve examination, (II, III, IV, VI) pupils are equal, round, and reactive to light. Visual acuity is adequate. Visual fields are full to finger confrontation. Extraocular motility is normal. (V, VII) Face is symmetric and sensation is impaired to light touch on the left. (VIII) Hearing is intact. (IX, X) Palate and uvula elevate symmetrically. Voice is normal. (XI) Shoulder shrug is strong and equal bilaterally. (XII)Tongue is midline. Motor examination - There is normal muscle tone and bulk. Power is full throughout, with exception of very mild pronation on the left, distal  4/5 on left. Muscle stretch reflexes are 1+ throughout. Plantar response is flexor bilaterally. Decreased sensation to light touch in LUE, LLE. Postural and intention tremor in BUE. Cerebellar examination is normal. Gait and stance unsteady, ambulating with cane, left leaning. + Romberg. Most recent CTA  - I personally reviewed this image   CT Result (most recent):  CT HEAD WO CONTRAST 09/20/2022    Narrative  EXAM: Noncontrast CT head. INDICATION: Syncope.     COMPARISON: Yesterday's CT head.    TECHNIQUE: Noncontrast CT images of the head were obtained. Radiation dose  reduction techniques were used for this study. Our CT scanners use one or all  of the following:  Automated exposure control, adjustment of the mA or kV  according to patient size, iterative reconstruction. FINDINGS: Brain volume is appropriate for age. No acute infarct, hemorrhage or  mass is identified. There is no mass effect, midline shift or depressed  fracture. The visualized paranasal sinuses and mastoid air cells are clear. Impression  No acute process. History: Syncopal episode on Saturday chest pain with radiation arm numbness and  tingling     Comments: CT ANGIOGRAM OF THE NECK AND CT ANGIOGRAM OF THE [place] was  obtained following the administration of IV contrast. IV contrast was  administered to evaluate the arterial vasculature. Reformatted images in the  coronal and sagittal planes as well as 3-D imaging was obtained and reviewed on  a dedicated PACS and [address]. Radiation reduction dose techniques  were used for the study. Our CT scanner use one or all of the following-  Automated exposure control, adjustment of the mA and/or KV according the patient  size, iterative reconstruction. All measurements are based upon NASCET criteria  if appropriate. This study was analyzed by the [address] ai.Algorithm     Contrast: 60 cc of Isovue 370     Findings:     CT ANGIOGRAM OF THE NECK:     The arch and proximal great vessels are patent. The right and left carotid bulbs  are patent. Mild atherosclerotic changes are noted on the right however no  significant stenosis. Proximal vertebral arteries are patent. The lung apices are clear. Thyroid gland is unremarkable. CT angiogram of [place]:     The petrous, cavernous, and supraclinoid internal carotid arteries are patent. The anterior and middle cerebral arteries are patent.      The distal vertebral arteries are patent however the origin of the basilar  artery is occluded however the vessel reconstitutes flow. The posterior cerebral  arteries are patent. The dural venous sinuses are patent. Impression  1. 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 The posterior cerebral arteries are patent. Most recent MRI - I personally reviewed this image   MRI Result (most recent):  MRI BRAIN WO CONTRAST 09/20/2022    Narrative  MRI of the brain    INDICATION: Left-sided weakness    Standard MRI sequences were obtained through the brain in multiple planes  without IV contrast    FINDINGS:  There are no areas of significant signal abnormality in the brain. There is no  evidence of acute hemorrhage or infarction. The ventricles are normal in size. There are no extra-axial fluid collections. There are no intracranial masses. There are no bony lesions. The sinuses are clear. Impression  No evidence of acute intracranial abnormality    09/19/22    TRANSTHORACIC ECHOCARDIOGRAM (TTE) COMPLETE (CONTRAST/BUBBLE/3D PRN) 09/20/2022 10:58 AM (Final)    Interpretation Summary    Left Ventricle: Normal left ventricular systolic function with a visually estimated EF of 55 - 60%. Left ventricle size is normal. Normal wall thickness. Normal wall motion. Normal diastolic function. Interatrial Septum: Agitated saline study was negative with and without provocation. Technical qualifiers: Color flow Doppler was performed and pulse wave and/or continuous wave Doppler was performed. Signed by:  Bi Kern MD on 9/20/2022 10:58 AM  Lab Results   Component Value Date    CHOL 172 09/19/2022     Lab Results   Component Value Date    TRIG 232 (H) 09/19/2022     Lab Results   Component Value Date    HDL 36 (L) 09/19/2022     Lab Results   Component Value Date    LDLCALC 89.6 09/19/2022     Lab Results   Component Value Date    LABVLDL 46.4 (H) 09/19/2022     Lab Results   Component Value Date    CHOLHDLRATIO 4.8 09/19/2022     Hemoglobin A1C   Date Value Ref Range Status   09/19/2022 5.7 (H) 4.8 - 5.6 % Final       Interpretation  This EEG was normal in wakefulness and sleep. Karen Ramírez was seen today for follow-up from hospital and cerebrovascular accident. Diagnoses and all orders for this visit:    Hospital discharge follow-up  -     AR DISCHARGE MEDS RECONCILED W/ CURRENT OUTPATIENT MED LIST    Stroke aborted by administration of thrombolytic agent ([address]  Continue secondary stroke prevention with ASA 81 mg daily and high intensity statin therapy  Will start on clopidogrel 75 mg daily. Will plan to check platelet function in 1 month. If he is not a responder, will need to transition to Brilinta 90 mg twice daily. Maintain -150 avoid hypotension and drastic fluctuation in BP due to basilar occlusion  Goal LDL<70  Goal A1C <7.0  Discussed Mediterranean diet and increasing cardiovascular exercise to goal of 30 minutes daily. Depression screening completed. Reviewed BE FAST and when to call 911. Basilar artery occlusion  Continue DAPT and high intensity statin therapy. He is high risk for recurrent stroke. CTA of head/neck showed occlusion of short segment of the origin of the basilar artery with  reconstitution of flow distally. He is not a surgical candidate, evaluated by Endovascular with [facility] and felt to be chronic  -     clopidogrel (PLAVIX) 75 MG tablet; Take 1 tablet by mouth daily  -     Platelet Function [id number]; Future    Tremor of both hands  Postural and intention tremor in BUE in the setting of chronic ETOH use  Discussed therapeutic options, however patient has elected not to pursue treatment at this time. Left-sided weakness  Continue therapies  Discussed fall risk and prevention  Continue to ambulate with assistive devices.      Dysphagia, unspecified type  Followed by GI, plan for esophogeal dilation    ETOH abuse  Continue to wean off ETOH, currently drinks 1-2 beers every other night and occasionally nightly  Recommend AA. Discussed importance of cessation. Continue thiamine 100 mg daily. -     thiamine 100 MG tablet; Take 1 tablet by mouth daily    Unsteady gait  Continue therapies    Anxiety and depression  Stable. Former tobacco use  Discussed importance of continued abstinence as this can increase risk for recurrent stroke and cardiovascular events. Follow up in 3 months or sooner    I spent greater than 50% of the 60 total minutes of today's visit in coordination of care and patient/family education and counseling regarding the above patient concerns, reviewing the patient's medical record, my assessment and recommendations.           Valente Marshall, [address] [place] [address] Neurology [facility]  [address] [facility], [address] [place] [address]  CYWXQ:[phone]

## 2022-10-25 ENCOUNTER — TELEPHONE (OUTPATIENT)
Dept: NEUROLOGY | Age: 47
End: 2022-10-25

## 2022-10-25 NOTE — TELEPHONE ENCOUNTER
Wife called stating that patient is going for surgery on Friday and will not be starting on the blood thinner until after then.

## 2022-10-27 ENCOUNTER — ANESTHESIA EVENT (OUTPATIENT)
Dept: ENDOSCOPY | Age: 47
End: 2022-10-27
Payer: COMMERCIAL

## 2022-10-27 RX ORDER — DEXTROSE MONOHYDRATE 100 MG/ML
INJECTION, SOLUTION INTRAVENOUS CONTINUOUS PRN
Status: CANCELLED | OUTPATIENT
Start: 2022-10-27

## 2022-10-27 NOTE — PROGRESS NOTES
Patient verified name, , and procedure. Type: 1a; abbreviated assessment per anesthesia guidelines  Labs per surgeon: none  Labs per anesthesia: none  Dr Salvatore Sheldon notified that pt had recent CVA on 22, previous EGD 10/9/22 for food bolus. No orders received. Instructed pt that they will be notified by the Gi Lab for time of arrival. If any questions please call the GI lab at [phone]. Follow diet and prep instructions per office. May have clear liquids until 4 hours prior to time of arrival.    Dickens Roxo or shower the night before and the am of surgery with antibacterial soap. No lotions, oils, powders, cologne on skin. No make up, eye make up or jewelry. Wear loose fitting comfortable, clean clothing. Must have adult present in building the entire time . Medications for the day of procedure ASA, Bupropion,Flomax    The following discharge instructions reviewed with patient: medication given during procedure may cause drowsiness for several hours, therefore, do not drive or operate machinery for remainder of the day, no alcohol on the day of your procedure, resume regular diet and activity unless otherwise directed, for mild sore throat you may use Cepacol throat lozenges or warm salt water gargles as needed, call your physician for any problems or questions. Patient verbalizes understanding.

## 2022-10-27 NOTE — PROGRESS NOTES
Informed patient of 0830 arrival time for 1000 procedure. Informed patient that they must have a  with them the entire time that they are in the hospital. Patient verbalized understanding of arrival location.

## 2022-10-28 ENCOUNTER — HOSPITAL ENCOUNTER (OUTPATIENT)
Age: 47
Setting detail: OUTPATIENT SURGERY
Discharge: HOME | End: 2022-10-28
Attending: INTERNAL MEDICINE | Admitting: INTERNAL MEDICINE
Payer: COMMERCIAL

## 2022-10-28 ENCOUNTER — ANESTHESIA (OUTPATIENT)
Dept: ENDOSCOPY | Age: 47
End: 2022-10-28
Payer: COMMERCIAL

## 2022-10-28 VITALS
BODY MASS INDEX: 27.28 KG/M2 | WEIGHT: 180 LBS | OXYGEN SATURATION: 100 % | HEART RATE: 79 BPM | TEMPERATURE: 98 F | DIASTOLIC BLOOD PRESSURE: 78 MMHG | HEIGHT: 68 IN | RESPIRATION RATE: 19 BRPM | SYSTOLIC BLOOD PRESSURE: 134 MMHG

## 2022-10-28 LAB
GLUCOSE BLD STRIP.AUTO-MCNC: 145 MG/DL (ref 65–100)
SERVICE CMNT-IMP: ABNORMAL

## 2022-10-28 PROCEDURE — 6360000002 HC RX W HCPCS: Performed by: NURSE ANESTHETIST, CERTIFIED REGISTERED

## 2022-10-28 PROCEDURE — 2709999900 HC NON-CHARGEABLE SUPPLY: Performed by: INTERNAL MEDICINE

## 2022-10-28 PROCEDURE — 3700000001 HC ADD 15 MINUTES (ANESTHESIA): Performed by: INTERNAL MEDICINE

## 2022-10-28 PROCEDURE — 3700000000 HC ANESTHESIA ATTENDED CARE: Performed by: INTERNAL MEDICINE

## 2022-10-28 PROCEDURE — 7100000011 HC PHASE II RECOVERY - ADDTL 15 MIN: Performed by: INTERNAL MEDICINE

## 2022-10-28 PROCEDURE — 2580000003 HC RX 258: Performed by: ANESTHESIOLOGY

## 2022-10-28 PROCEDURE — 7100000010 HC PHASE II RECOVERY - FIRST 15 MIN: Performed by: INTERNAL MEDICINE

## 2022-10-28 PROCEDURE — 3609012700 HC EGD DILATION SAVORY: Performed by: INTERNAL MEDICINE

## 2022-10-28 PROCEDURE — 88305 TISSUE EXAM BY PATHOLOGIST: CPT

## 2022-10-28 PROCEDURE — 82962 GLUCOSE BLOOD TEST: CPT

## 2022-10-28 RX ORDER — SODIUM CHLORIDE, SODIUM LACTATE, POTASSIUM CHLORIDE, CALCIUM CHLORIDE 600; 310; 30; 20 MG/100ML; MG/100ML; MG/100ML; MG/100ML
INJECTION, SOLUTION INTRAVENOUS CONTINUOUS
Status: DISCONTINUED | OUTPATIENT
Start: 2022-10-28 | End: 2022-10-28 | Stop reason: HOSPADM

## 2022-10-28 RX ORDER — LIDOCAINE HYDROCHLORIDE 10 MG/ML
1 INJECTION, SOLUTION INFILTRATION; PERINEURAL
Status: DISCONTINUED | OUTPATIENT
Start: 2022-10-28 | End: 2022-10-28 | Stop reason: HOSPADM

## 2022-10-28 RX ORDER — PROPOFOL 10 MG/ML
INJECTION, EMULSION INTRAVENOUS PRN
Status: DISCONTINUED | OUTPATIENT
Start: 2022-10-28 | End: 2022-10-28 | Stop reason: SDUPTHER

## 2022-10-28 RX ORDER — SODIUM CHLORIDE 9 MG/ML
INJECTION, SOLUTION INTRAVENOUS PRN
Status: DISCONTINUED | OUTPATIENT
Start: 2022-10-28 | End: 2022-10-28 | Stop reason: HOSPADM

## 2022-10-28 RX ORDER — SODIUM CHLORIDE 0.9 % (FLUSH) 0.9 %
5-40 SYRINGE (ML) INJECTION EVERY 12 HOURS SCHEDULED
Status: DISCONTINUED | OUTPATIENT
Start: 2022-10-28 | End: 2022-10-28 | Stop reason: HOSPADM

## 2022-10-28 RX ORDER — SODIUM CHLORIDE 0.9 % (FLUSH) 0.9 %
5-40 SYRINGE (ML) INJECTION PRN
Status: DISCONTINUED | OUTPATIENT
Start: 2022-10-28 | End: 2022-10-28 | Stop reason: HOSPADM

## 2022-10-28 RX ORDER — ONDANSETRON 2 MG/ML
4 INJECTION INTRAMUSCULAR; INTRAVENOUS
Status: DISCONTINUED | OUTPATIENT
Start: 2022-10-28 | End: 2022-10-28 | Stop reason: HOSPADM

## 2022-10-28 RX ADMIN — PROPOFOL 100 MG: 10 INJECTION, EMULSION INTRAVENOUS at 10:17

## 2022-10-28 RX ADMIN — PROPOFOL 50 MG: 10 INJECTION, EMULSION INTRAVENOUS at 10:29

## 2022-10-28 RX ADMIN — PROPOFOL 50 MG: 10 INJECTION, EMULSION INTRAVENOUS at 10:34

## 2022-10-28 RX ADMIN — SODIUM CHLORIDE, POTASSIUM CHLORIDE, SODIUM LACTATE AND CALCIUM CHLORIDE: 600; 310; 30; 20 INJECTION, SOLUTION INTRAVENOUS at 09:47

## 2022-10-28 RX ADMIN — PROPOFOL 50 MG: 10 INJECTION, EMULSION INTRAVENOUS at 10:23

## 2022-10-28 RX ADMIN — PROPOFOL 50 MG: 10 INJECTION, EMULSION INTRAVENOUS at 10:26

## 2022-10-28 RX ADMIN — PROPOFOL 50 MG: 10 INJECTION, EMULSION INTRAVENOUS at 10:20

## 2022-10-28 RX ADMIN — PROPOFOL 50 MG: 10 INJECTION, EMULSION INTRAVENOUS at 10:31

## 2022-10-28 ASSESSMENT — LIFESTYLE VARIABLES: SMOKING_STATUS: 0

## 2022-10-28 ASSESSMENT — PAIN - FUNCTIONAL ASSESSMENT: PAIN_FUNCTIONAL_ASSESSMENT: NONE - DENIES PAIN

## 2022-10-28 NOTE — OP NOTE
ESOPHAGOGASTRODUODENOSCOPY    Pre op-dysphagia    DATE of PROCEDURE: 10/28/2022    PT NAME: Katina Flores     [id number]    MEDICATION:   MAC        INSTRUMENT: GIFH 190    SPECIAL PROCEDURE: 40,46,48, and 50 fr eligio gamino  BLOOD LOSS- 0 or min. SPEC- yes  IMPLANT- none    PROCEDURE:  After informed consent, the patient was placed Under anesthesia in the left lateral decubitus position. The endoscope was passed visually without difficulty. The examination was performed to the duodenum with the findings and treatments as outlined below. Patient's tolerated the procedure well. No apparent complications.       ASSESSMENT:  Stricture at 35 cm- trauma only with the 50 fr but a deep rent  Schatzki's ring at 40 cm- expected amt of trauma seen   Bx to r/o EoE but low suspicion by appearance   In review I would recommend savary dilation in the future due to the tortuosity of his distal colon and the unexpected rent    PLAN:   Start plavix in 2 dayy  F/U Dr. Ammy Grissom in 2 months  Soft diet   Schedule screening colonoscopy    C Cherri Stover MD

## 2022-10-28 NOTE — PROGRESS NOTES
Epic down during patient's recovery time.  Handwritten d/c instructions reviewed with patient and his wife

## 2022-10-28 NOTE — ANESTHESIA POSTPROCEDURE EVALUATION
Department of Anesthesiology  Postprocedure Note    Patient: Nikita Biswas  MRN: [id number]  YOB: 1975  Date of evaluation: 10/28/2022      Procedure Summary     Date: 10/28/22 Room / Location:  ENDO 04 /  ENDOSCOPY    Anesthesia Start: 1016 Anesthesia Stop:     Procedure: EGD DILATION Patel Fitch and bx  (Upper GI Region) Diagnosis:       Food in esophagus causing other injury, sequela      (Food in esophagus causing other injury, sequela [T18.128S])    Surgeons: Marva Kingsley MD Responsible Provider: Janneth Choudhury MD    Anesthesia Type: TIVA ASA Status: 3          Anesthesia Type: No value filed. Jaiden Phase I: Jaiden Score: 10    Jaiden Phase II:        Anesthesia Post Evaluation    Patient location during evaluation: PACU  Patient participation: complete - patient participated  Level of consciousness: awake and alert  Airway patency: patent  Nausea: well controlled.   Complications: no  Cardiovascular status: hemodynamically stable  Respiratory status: acceptable  Hydration status: stable

## 2022-10-28 NOTE — ANESTHESIA PRE PROCEDURE
MD           Allergies: Allergies   Allergen Reactions    Cashews [Macadamia Nut Oil] Anaphylaxis    Fluoxetine Shortness Of Breath    Coconut (Cocos Nucifera) Allergy Skin Test Itching    Gabapentin      Headaches and shaking    Morphine Other (See Comments)     Other reaction(s): Other- (not listed) - Allergy      Penicillins Hives    Propoxyphene Other (See Comments)     Other reaction(s): Other- (not listed) - Allergy      Sertraline Hives    Coconut Fatty Acids Hives, Swelling and Rash              Problem List:    Patient Active Problem List   Diagnosis Code    Cerebrovascular accident (CVA) ([facility] I63.9    Hypertension I10    Borderline diabetes R73.03    Depression F32. A    Alcohol withdrawal syndrome without complication (HCC) N24.991    Left sided numbness R20.0    Acute cystitis without hematuria N30.00    Anxiety F41.9    Carpal tunnel syndrome G56.00    Chronic pain disorder G89.4    Cognitive communication deficit R41.841    Decreased  strength R29.898    Dysarthria R47.1    Hearing loss H91.90    Hypercholesterolemia E78.00    Other hyperlipidemia E78.49    Insomnia G47.00    Left arm weakness R29.898    Mild bipolar disorder (HCC) F31.9    Occasional tremors R25.1    Overweight with body mass index (BMI) 25.0-29.9 E66.3    Pulmonary function studies abnormal R94.2    Tachycardia R00.0    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    Thoracic back pain M54.6    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    Unsteadiness on feet R26.81       Past Medical History:        Diagnosis Date    Borderline diabetes     Cerebral artery occlusion with cerebral infarction ([facility] [address] [facility]     CVA (cerebral vascular accident) ([facility] [address] [facility]     Depression     Diabetes mellitus ([facility] [address]     Hypertension        Past Surgical History:        Procedure Laterality Date    UPPER GASTROINTESTINAL ENDOSCOPY N/A 10/9/2022    EGD ESOPHAGOGASTRODUODENOSCOPY foreign body removal performed by Alexandra Bains MD at [facility] ENDOSCOPY       Social History:    Social History     Tobacco Use    Smoking status: Never    Smokeless tobacco: Never   Substance Use Topics    Alcohol use: Yes     Alcohol/week: 12.0 standard drinks     Types: 12 Cans of beer per week                                Counseling given: Not Answered      Vital Signs (Current):   Vitals:    10/27/22 0909   Weight: 180 lb (81.6 kg)   Height: 5' 8\" (1.727 m)                                              BP Readings from Last 3 Encounters:   10/24/22 (!) 131/97   10/09/22 120/75   10/09/22 117/89       NPO Status: Time of last liquid consumption: 2300                        Time of last solid consumption: 1800                        Date of last liquid consumption: 10/27/22                        Date of last solid food consumption: 10/27/22    BMI:   Wt Readings from Last 3 Encounters:   10/27/22 180 lb (81.6 kg)   10/24/22 182 lb 3.2 oz (82.6 kg)   10/09/22 185 lb (83.9 kg)     Body mass index is 27.37 kg/m². CBC:   Lab Results   Component Value Date/Time    WBC 11.3 09/21/2022 03:25 AM    RBC 4.98 09/21/2022 03:25 AM    HGB 16.2 09/21/2022 03:25 AM    HCT 45.9 09/21/2022 03:25 AM    MCV 92.2 09/21/2022 03:25 AM    RDW 11.9 09/21/2022 03:25 AM     09/21/2022 03:25 AM       CMP:   Lab Results   Component Value Date/Time     09/22/2022 03:29 AM    K 3.7 09/22/2022 03:29 AM     09/22/2022 03:29 AM    CO2 28 09/22/2022 03:29 AM    BUN 15 09/22/2022 03:29 AM    CREATININE 1.10 09/22/2022 03:29 AM    GFRAA >60 09/22/2022 03:29 AM    LABGLOM >60 09/22/2022 03:29 AM    GLUCOSE 128 09/22/2022 03:29 AM    PROT 6.9 09/19/2022 09:44 AM    CALCIUM 8.8 09/22/2022 03:29 AM    BILITOT 0.5 09/19/2022 09:44 AM    ALKPHOS 73 09/19/2022 09:44 AM    AST 27 09/19/2022 09:44 AM    ALT 28 09/19/2022 09:44 AM       POC Tests: No results for input(s): POCGLU, POCNA, POCK, POCCL, POCBUN, POCHEMO, POCHCT in the last 72 hours.     Coags:   Lab Results   Component Value Date/Time    PROTIME 13.4 09/19/2022 09:44 AM    INR 1.0 09/19/2022 09:44 AM       HCG (If Applicable): No results found for: PREGTESTUR, PREGSERUM, HCG, HCGQUANT     ABGs: No results found for: PHART, PO2ART, KZM8BPM, GCU7EAV, BEART, Z5CEBKSF     Type & Screen (If Applicable):  No results found for: LABABO, LABRH    Drug/Infectious Status (If Applicable):  No results found for: HIV, HEPCAB    COVID-19 Screening (If Applicable): No results found for: COVID19        Anesthesia Evaluation  Patient summary reviewed and Nursing notes reviewed no history of anesthetic complications:   Airway: Mallampati: II       Comment: Full beard     Dental:    (+) edentulous      Pulmonary:normal exam        (-) not a current smoker                           Cardiovascular:    (+) hypertension:,                   Neuro/Psych:   (+) CVA (L sided weakness, R facial weakness, resting tremor): residual symptoms, psychiatric history:depression/anxiety              ROS comment: Bipolar    Chronic pain GI/Hepatic/Renal:             Endo/Other:    (+) DiabetesType II DM, , .                 Abdominal:             Vascular: Other Findings:           Anesthesia Plan      TIVA     ASA 3       Induction: intravenous. Anesthetic plan and risks discussed with patient.                         Rolan Mohamud MD   10/28/2022

## 2022-10-28 NOTE — DISCHARGE INSTRUCTIONS
Gastrointestinal Esophagogastroduodenoscopy (EGD)/ Endoscopic Ultrasound(EUS)- Upper Exam Discharge Instructions    1. Call Dr. Chante Go at [phone]  for any problems or questions. 2. Contact the doctor's office for follow up appointment as directed. 3. Medication may cause drowsiness for several hours, therefore, do not drive or operate machinery for remainder of the day. 4. No alcohol today. 5. Do not make any important decisions such as signing legal paperwork. 6. Ordinarily, you may resume regular diet and activity after exam unless otherwise specified by your physician. 7. For mild soreness in your throat you may use Cepacol throat lozenges or warm  salt-water gargles as needed.     Any additional instructions:

## 2022-10-28 NOTE — INTERVAL H&P NOTE
Update History & Physical    The patient's History and Physical of October 9, 2022 was reviewed with the patient and I examined the patient. There was no change. The surgical site was confirmed by the patient and me. Plan: The risks, benefits, expected outcome, and alternative to the recommended procedure have been discussed with the patient. Patient understands and wants to proceed with the procedure.      Electronically signed by Didi Merritt MD on 10/28/2022 at 10:09 AM

## 2022-12-02 ENCOUNTER — OFFICE VISIT (OUTPATIENT)
Dept: ENT CLINIC | Age: 47
End: 2022-12-02

## 2022-12-02 ENCOUNTER — OFFICE VISIT (OUTPATIENT)
Dept: ENT CLINIC | Age: 47
End: 2022-12-02
Payer: COMMERCIAL

## 2022-12-02 VITALS — WEIGHT: 185 LBS | BODY MASS INDEX: 26.48 KG/M2 | HEIGHT: 70 IN

## 2022-12-02 DIAGNOSIS — H90.3 ASYMMETRICAL SENSORINEURAL HEARING LOSS: ICD-10-CM

## 2022-12-02 DIAGNOSIS — H90.3 SENSORINEURAL HEARING LOSS, BILATERAL: Primary | ICD-10-CM

## 2022-12-02 DIAGNOSIS — H60.8X3 CHRONIC ECZEMATOUS OTITIS EXTERNA OF BOTH EARS: ICD-10-CM

## 2022-12-02 DIAGNOSIS — H92.22 OTORRHAGIA OF LEFT EAR: Primary | ICD-10-CM

## 2022-12-02 PROCEDURE — 99244 OFF/OP CNSLTJ NEW/EST MOD 40: CPT | Performed by: OTOLARYNGOLOGY

## 2022-12-02 RX ORDER — FLUOCINOLONE ACETONIDE 0.11 MG/ML
5 OIL AURICULAR (OTIC) 2 TIMES DAILY
Qty: 20 ML | Refills: 3 | Status: SHIPPED | OUTPATIENT
Start: 2022-12-02

## 2022-12-02 NOTE — PROGRESS NOTES
[facility] had Audiometry performed today. The patient reports decreased hearing. Results as follows: Audiometry    Test Performed - Comprehensive Audiogram    Type of Loss - Right Ear: abnormal hearing: degree of loss is normal to moderately severe sensorineural hearing loss                           Left Ear: abnormal hearing: degree of loss is mild to severe sensorineural hearing loss     SRT   Measurement Right Ear Left Ear   Value 30 45   Unit dB dB     Discrimination  Measurement Right Ear Left Ear   Value 100% 100%   Unit dB dB     Recommend  Further testing due to asymmetry and amplification following medical clearance    A.  [address], [address]  Audiologist

## 2022-12-12 ENCOUNTER — TELEPHONE (OUTPATIENT)
Dept: NEUROLOGY | Age: 47
End: 2022-12-12

## 2022-12-19 NOTE — TELEPHONE ENCOUNTER
Forms faxed by MA to . Confirmation received. Patient notified paperwork ready for  and there will be a $35 charge.

## 2023-01-24 ENCOUNTER — TELEPHONE (OUTPATIENT)
Dept: NEUROLOGY | Age: 48
End: 2023-01-24

## 2023-01-24 ENCOUNTER — OFFICE VISIT (OUTPATIENT)
Dept: NEUROLOGY | Age: 48
End: 2023-01-24
Payer: COMMERCIAL

## 2023-01-24 VITALS
BODY MASS INDEX: 26.32 KG/M2 | WEIGHT: 183.4 LBS | OXYGEN SATURATION: 98 % | DIASTOLIC BLOOD PRESSURE: 83 MMHG | HEART RATE: 92 BPM | SYSTOLIC BLOOD PRESSURE: 133 MMHG

## 2023-01-24 DIAGNOSIS — Z91.81 HISTORY OF RECENT FALL: ICD-10-CM

## 2023-01-24 DIAGNOSIS — Z87.891 FORMER TOBACCO USE: ICD-10-CM

## 2023-01-24 DIAGNOSIS — I63.9 STROKE ABORTED BY ADMINISTRATION OF THROMBOLYTIC AGENT (HCC): Primary | ICD-10-CM

## 2023-01-24 DIAGNOSIS — R25.1 TREMOR OF BOTH HANDS: ICD-10-CM

## 2023-01-24 DIAGNOSIS — F10.10 ETOH ABUSE: ICD-10-CM

## 2023-01-24 DIAGNOSIS — I65.1 BASILAR ARTERY OCCLUSION: ICD-10-CM

## 2023-01-24 DIAGNOSIS — M54.81 BILATERAL OCCIPITAL NEURALGIA: ICD-10-CM

## 2023-01-24 PROCEDURE — 99214 OFFICE O/P EST MOD 30 MIN: CPT | Performed by: NURSE PRACTITIONER

## 2023-01-24 PROCEDURE — 3075F SYST BP GE 130 - 139MM HG: CPT | Performed by: NURSE PRACTITIONER

## 2023-01-24 PROCEDURE — 3079F DIAST BP 80-89 MM HG: CPT | Performed by: NURSE PRACTITIONER

## 2023-01-24 RX ORDER — FOLIC ACID 1 MG/1
1 TABLET ORAL DAILY
Qty: 90 TABLET | Refills: 1 | Status: SHIPPED | OUTPATIENT
Start: 2023-01-24

## 2023-01-24 RX ORDER — PAROXETINE 10 MG/1
10 TABLET, FILM COATED ORAL EVERY MORNING
COMMUNITY

## 2023-01-24 ASSESSMENT — PATIENT HEALTH QUESTIONNAIRE - PHQ9
SUM OF ALL RESPONSES TO PHQ9 QUESTIONS 1 & 2: 0
SUM OF ALL RESPONSES TO PHQ QUESTIONS 1-9: 0
1. LITTLE INTEREST OR PLEASURE IN DOING THINGS: 0
SUM OF ALL RESPONSES TO PHQ QUESTIONS 1-9: 0
2. FEELING DOWN, DEPRESSED OR HOPELESS: 0

## 2023-01-24 NOTE — PROGRESS NOTES
[facility] Neurology [facility] [address]  [facility], [address] [facility]      Chief Complaint   Patient presents with    Follow-up     stroke         Lexus Leo is a 50 y.o. male who presents for hospital follow up stroke. PMH significant for prediabetes, HTN, anxiety/depression, ETOH abuse and tobacco abuse who presented to ED on 9/19/2022 with left sided numbness. Code S was initiated. He was evaluated by teleneurology. NIHSS was 2. CT of head negative for acute intracranial abnormality. 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 Images were reviewed by [facility],  images were reviewed and felt to be chronic. He was not candidate for ROBI. Tenecteplase was given. He was subsequently admitted to ICU. MRI of brain negative for acute infarct. EEG normal. TTE EF 55-60% negative for PFO or LAD. EKG NSR. UDS + amphetamines. UA +, treated with cipro. Treated with 5 days of librium given daily ETOH abuse. He was evaluated by PT/OT/St. He was discharged home. Interval history:    He is here today by himself. He has returned to work. He continues to endorse left sided weakness/sensory changes, difficulty with concentration/focusing, and BUE tremor. Stated he has difficulty with concentration and reading when working on his computer for long periods of time. He has completed his therapies, and continues to do the exercises taught to him. He is independent of his ADLs. He is currently taking ASA 81 mg daily and atorvastatin 80 mg daily for secondary stroke prevention. He continues to endorse drinking 4 beers nightly. He is continuing to work on quitting. He is currently taking thiamine 100 mg daily. He is a former tobacco use, stated he quit over 20 years ago. Denies recreational drug use.      He is endorses hx of anxiety and depression- endorses 1 episode of emotional lability, described as getting irrationally mad. denies SI or hurting others. Currently taking wellbutrin, fluoxetine, and trazadone. Hx of recent falls- stated he fell in the shower when he was attempting to get in. Denies hitting his head or LOC. Resulted in contusion to left lower extremity. He also endorses slipping on wet stair and losing his balance and falling. Denies hitting head or LOC. Endorses intermittent headaches located in occipital region bilaterally. Described as throbbing, pressure. Associated with photophobia,blurred vision, and allodynia along occipital ridge. Worse when pressure is applied to site. Alleviated with lying down. He has tried OTC tylenol, advil with no relief. Duration 4-24 hours. Severity 7/10, denies headache today. No prior hx or family hx of migraines or headaches. Past Medical History:   Diagnosis Date    Borderline diabetes     Cerebral artery occlusion with cerebral infarction [facility])     CVA (cerebral vascular accident) ([facility] [address]     Depression     Diabetes mellitus ([facility] [address]     Hypertension        Past Surgical History:   Procedure Laterality Date    ESOPHAGOGASTRODUODENOSCOPY  10/28/2022    50 fr gamino    UPPER GASTROINTESTINAL ENDOSCOPY N/A 10/09/2022    EGD ESOPHAGOGASTRODUODENOSCOPY foreign body removal performed by Raul Lees MD at [facility] ENDOSCOPY    UPPER GASTROINTESTINAL ENDOSCOPY N/A 10/28/2022    EGD DILATION Lue Seton and bx  performed by Jericho Rodriguez MD at [facility] ENDOSCOPY       Family History   Problem Relation Age of Onset    Diabetes Mother     Seizures Father        Social History     Socioeconomic History    Marital status:    Tobacco Use    Smoking status: Never    Smokeless tobacco: Never   Vaping Use    Vaping Use: Never used   Substance and Sexual Activity    Alcohol use:  Yes     Alcohol/week: 12.0 standard drinks     Types: 12 Cans of beer per week    Drug use: Never    Sexual activity: Yes     Partners: Female         Current Outpatient Medications: fluocinolone (DERMOTIC) 0.01 % OIL oil, Place 5 drops in ear(s) 2 times daily, Disp: 20 mL, Rfl: 3    metFORMIN (GLUCOPHAGE-XR) 500 MG extended release tablet, TAKE 1 TABLET BY MOUTH ONCE DAILY WITH BREAKFAST PREDIABETES, Disp: , Rfl:     clopidogrel (PLAVIX) 75 MG tablet, Take 1 tablet by mouth daily, Disp: 90 tablet, Rfl: 1    thiamine 100 MG tablet, Take 1 tablet by mouth daily, Disp: 90 tablet, Rfl: 3    olmesartan (BENICAR) 20 MG tablet, Take 20 mg by mouth daily, Disp: , Rfl:     atorvastatin (LIPITOR) 80 MG tablet, Take 1 tablet by mouth nightly, Disp: 30 tablet, Rfl: 3    aspirin 81 MG EC tablet, Take 1 tablet by mouth daily, Disp: 30 tablet, Rfl: 3    tamsulosin (FLOMAX) 0.4 MG capsule, Take 1 capsule by mouth daily, Disp: 90 capsule, Rfl: 1    traZODone (DESYREL) 50 MG tablet, Take 100 mg by mouth at bedtime, Disp: , Rfl:     buPROPion (WELLBUTRIN XL) 300 MG extended release tablet, Take 300 mg by mouth every morning, Disp: , Rfl:     Allergies   Allergen Reactions    Cashews [Macadamia Nut Oil] Anaphylaxis    Fluoxetine Shortness Of Breath    Coconut (Cocos Nucifera) Allergy Skin Test Itching    Gabapentin      Headaches and shaking    Morphine Other (See Comments)     Other reaction(s): Other- (not listed) - Allergy      Penicillins Hives    Propoxyphene Other (See Comments)     Other reaction(s): Other- (not listed) - Allergy      Sertraline Hives    Coconut Fatty Acids Hives, Swelling and Rash              REVIEW OF SYSTEMS:  CONSTITUTIONAL: No weight loss, fever, chills, weakness or fatigue. HEENT: Eyes: No visual loss, blurred vision, double vision or yellow sclerae. Ears, Nose, Throat: No hearing loss, sneezing, congestion, runny nose or sore throat. SKIN: No rash or itching. CARDIOVASCULAR: No chest pain, chest pressure or chest discomfort. No palpitations or edema. RESPIRATORY: No shortness of breath, cough or sputum. GASTROINTESTINAL: No anorexia, nausea, vomiting or diarrhea.  No abdominal pain or blood. GENITOURINARY: no burning with urination. NEUROLOGICAL: left sided weakness/sensory changes, BUE tremors, gait imbalance No headache, dizziness, syncope, paralysis, ataxia, numbness or tingling in the extremities. No change in bowel or bladder control. MUSCULOSKELETAL: No muscle, back pain, joint pain or stiffness. HEMATOLOGIC: No anemia, bleeding or bruising. LYMPHATICS: No enlarged nodes. No history of splenectomy. PSYCHIATRIC: history of depression or anxiety. ENDOCRINOLOGIC: No reports of sweating, cold or heat intolerance. No polyuria or polydipsia. ALLERGIES: No history of asthma, hives, eczema or rhinitis. Physical Examination  /83   Pulse 92   Wt 183 lb 6.4 oz (83.2 kg)   SpO2 98%   BMI 26.32 kg/m²     General - Well developed, well nourished, in no apparent distress. Pleasant and conversant. HEENT - Normocephalic, atraumatic. Conjunctiva, tympanic membranes, and oropharynx are clear. Bilateral occipital ridge tenderness with palpation. Neck - Supple without masses  Extremities - Peripheral pulses intact. No edema and no rashes. Neurological examination - Comprehension, attention, memory and reasoning are intact. Language and speech are normal.   On cranial nerve examination, (II, III, IV, VI) pupils are equal, round, and reactive to light. Visual acuity is adequate. Visual fields are full to finger confrontation. Extraocular motility is normal. (V, VII) Face is symmetric and sensation is impaired to light touch on the left. (VIII) Hearing is intact. (IX, X) Palate and uvula elevate symmetrically. Voice is normal. (XI) Shoulder shrug is strong and equal bilaterally. (XII)Tongue is midline. Motor examination - There is normal muscle tone and bulk. Power is full throughout, with exception of very mild pronation on the left, distal  4/5 on left. Muscle stretch reflexes are 1+ throughout. Plantar response is flexor bilaterally.  Decreased sensation to light touch in LUE, LLE. Postural and intention tremor in BUE. Cerebellar examination is normal. Gait and stance unsteady, ambulating with cane, left leaning. + Romberg. Lab Results   Component Value Date    CHOL 172 09/19/2022     Lab Results   Component Value Date    TRIG 232 (H) 09/19/2022     Lab Results   Component Value Date    HDL 36 (L) 09/19/2022     Lab Results   Component Value Date    LDLCALC 89.6 09/19/2022     Lab Results   Component Value Date    LABVLDL 46.4 (H) 09/19/2022     Lab Results   Component Value Date    CHOLHDLRATIO 4.8 09/19/2022     Hemoglobin A1C   Date Value Ref Range Status   09/19/2022 5.7 (H) 4.8 - 5.6 % Final           Diagnoses and all orders for this visit:  1. Stroke aborted by administration of thrombolytic agent ([address]  Continue secondary stroke prevention with clopidogrel 75 mg daily  and atorvastatin  Maintain -150 avoid hypotension and drastic fluctuation in BP due to basilar occlusion  Goal LDL<70  Goal A1C <7.0  Discussed Mediterranean diet and increasing cardiovascular exercise to goal of 30 minutes daily. Depression screening completed. Reviewed BE FAST and when to call 911.     2. Basilar artery occlusion  Continue clopidogrel and atorvastatin    3. Tremor of both hands  Postural and intention tremor in BUE in the setting of chronic ETOH use  Discussed therapeutic options, however patient has elected not to pursue treatment at this time. 4. ETOH abuse  Discussed importance of abstinence. He is currently drinking 4 beers nightly. Recommend AA. Continue thiamine 100 mg daily. Start folic acid 1 mg daily. - folic acid (FOLVITE) 1 MG tablet; Take 1 tablet by mouth daily  Dispense: 90 tablet; Refill: 1    5. Former tobacco use  Discussed importance of continued abstinence. 6. Bilateral occipital neuralgia  Will send to [facility] for occipital nerve block. 7. History of recent fall  Discussed fall risk and prevention.          Follow up in 6 months or sooner    I spent greater than 50% of the 30 total minutes of today's visit in coordination of care and patient/family education and counseling regarding the above patient concerns, reviewing the patient's medical record, my assessment and recommendations.           Corwin Thomas, [address] [place] [address] Neurology [address] [place] [address], [facility]  WNUDP:[phone]

## 2023-04-25 ENCOUNTER — TELEPHONE (OUTPATIENT)
Dept: NEUROLOGY | Age: 48
End: 2023-04-25

## 2023-06-15 ENCOUNTER — TELEPHONE (OUTPATIENT)
Dept: NEUROLOGY | Age: 48
End: 2023-06-15

## 2023-07-21 RX ORDER — ASPIRIN 81 MG/1
81 TABLET ORAL DAILY
COMMUNITY
End: 2023-07-24 | Stop reason: SDUPTHER

## 2023-07-24 ENCOUNTER — OFFICE VISIT (OUTPATIENT)
Dept: NEUROLOGY | Age: 48
End: 2023-07-24
Payer: COMMERCIAL

## 2023-07-24 VITALS
HEART RATE: 88 BPM | BODY MASS INDEX: 26.54 KG/M2 | SYSTOLIC BLOOD PRESSURE: 114 MMHG | HEIGHT: 70 IN | DIASTOLIC BLOOD PRESSURE: 94 MMHG | OXYGEN SATURATION: 97 %

## 2023-07-24 DIAGNOSIS — I65.1 BASILAR ARTERY OCCLUSION: ICD-10-CM

## 2023-07-24 DIAGNOSIS — R25.1 TREMOR OF BOTH HANDS: ICD-10-CM

## 2023-07-24 DIAGNOSIS — I63.9 STROKE ABORTED BY ADMINISTRATION OF THROMBOLYTIC AGENT (HCC): Primary | ICD-10-CM

## 2023-07-24 DIAGNOSIS — M54.81 OCCIPITAL NEURALGIA OF LEFT SIDE: ICD-10-CM

## 2023-07-24 DIAGNOSIS — F10.10 ETOH ABUSE: ICD-10-CM

## 2023-07-24 DIAGNOSIS — Z87.891 FORMER TOBACCO USE: ICD-10-CM

## 2023-07-24 PROBLEM — F10.20 ALCOHOLISM (HCC): Status: ACTIVE | Noted: 2023-04-24

## 2023-07-24 PROBLEM — G43.909 MIGRAINE: Status: ACTIVE | Noted: 2023-04-24

## 2023-07-24 PROBLEM — N52.9 ERECTILE DYSFUNCTION: Status: ACTIVE | Noted: 2023-04-24

## 2023-07-24 PROBLEM — E53.8 FOLIC ACID DEFICIENCY: Status: ACTIVE | Noted: 2023-04-24

## 2023-07-24 PROCEDURE — 99214 OFFICE O/P EST MOD 30 MIN: CPT | Performed by: NURSE PRACTITIONER

## 2023-07-24 PROCEDURE — 3074F SYST BP LT 130 MM HG: CPT | Performed by: NURSE PRACTITIONER

## 2023-07-24 PROCEDURE — 3080F DIAST BP >= 90 MM HG: CPT | Performed by: NURSE PRACTITIONER

## 2023-07-24 RX ORDER — CLOPIDOGREL BISULFATE 75 MG/1
75 TABLET ORAL DAILY
Qty: 90 TABLET | Refills: 3 | Status: SHIPPED | OUTPATIENT
Start: 2023-07-24

## 2023-07-24 RX ORDER — OXCARBAZEPINE 150 MG/1
150 TABLET, FILM COATED ORAL 2 TIMES DAILY
Qty: 60 TABLET | Refills: 3 | Status: SHIPPED | OUTPATIENT
Start: 2023-07-24

## 2023-07-24 RX ORDER — ASPIRIN 81 MG/1
81 TABLET ORAL DAILY
Qty: 30 TABLET | COMMUNITY
Start: 2023-07-24

## 2023-07-24 RX ORDER — RIMEGEPANT SULFATE 75 MG/75MG
1 TABLET, ORALLY DISINTEGRATING ORAL DAILY PRN
COMMUNITY

## 2023-07-24 RX ORDER — LANOLIN ALCOHOL/MO/W.PET/CERES
100 CREAM (GRAM) TOPICAL DAILY
Qty: 90 TABLET | Refills: 3 | Status: SHIPPED | OUTPATIENT
Start: 2023-07-24

## 2023-07-24 RX ORDER — ATORVASTATIN CALCIUM 80 MG/1
80 TABLET, FILM COATED ORAL NIGHTLY
Qty: 90 TABLET | Refills: 3 | Status: SHIPPED | OUTPATIENT
Start: 2023-07-24

## 2023-07-24 RX ORDER — FOLIC ACID 1 MG/1
1 TABLET ORAL DAILY
Qty: 90 TABLET | Refills: 3 | Status: SHIPPED | OUTPATIENT
Start: 2023-07-24

## 2023-07-24 NOTE — PROGRESS NOTES
3    5. Former tobacco use      6. Occipital neuralgia of left side  Will start on oxcarbazepine 150 mg twice daily. Medication and side effects discussed. Will arrange for follow up with [address] [place] [address]. If ineffective, recommend occipital nerve block or botox.   - OXcarbazepine (TRILEPTAL) 150 MG tablet; Take 1 tablet by mouth 2 times daily  Dispense: 60 tablet; Refill: 3      Follow up in 1 year or sooner if needed     I spent  50% of the 45 total minutes of today's visit in coordination of care and patient/family education and counseling regarding the above patient concerns, reviewing the patient's medical record, my assessment and recommendations.           Fidel Bacon, [facility] Neurology [address], [address] [place] [address]  AZFQF:[phone]

## 2023-08-05 ENCOUNTER — APPOINTMENT (OUTPATIENT)
Dept: CT IMAGING | Age: 48
End: 2023-08-05
Payer: COMMERCIAL

## 2023-08-05 ENCOUNTER — HOSPITAL ENCOUNTER (EMERGENCY)
Age: 48
Discharge: HOME | End: 2023-08-05
Attending: EMERGENCY MEDICINE
Payer: COMMERCIAL

## 2023-08-05 VITALS
HEIGHT: 70 IN | OXYGEN SATURATION: 98 % | WEIGHT: 185 LBS | SYSTOLIC BLOOD PRESSURE: 121 MMHG | TEMPERATURE: 98.7 F | BODY MASS INDEX: 26.48 KG/M2 | RESPIRATION RATE: 16 BRPM | HEART RATE: 108 BPM | DIASTOLIC BLOOD PRESSURE: 110 MMHG

## 2023-08-05 DIAGNOSIS — K52.9 GASTROENTERITIS: ICD-10-CM

## 2023-08-05 DIAGNOSIS — R10.9 ABDOMINAL PAIN, UNSPECIFIED ABDOMINAL LOCATION: Primary | ICD-10-CM

## 2023-08-05 LAB
ALBUMIN SERPL-MCNC: 4.3 G/DL (ref 3.5–5)
ALBUMIN/GLOB SERPL: 1.7 (ref 0.4–1.6)
ALP SERPL-CCNC: 102 U/L (ref 45–117)
ALT SERPL-CCNC: 28 U/L (ref 13–61)
ANION GAP SERPL CALC-SCNC: 10 MMOL/L (ref 2–11)
APPEARANCE UR: CLEAR
AST SERPL-CCNC: 28 U/L (ref 15–37)
BASOPHILS # BLD: 0 K/UL (ref 0–0.2)
BASOPHILS NFR BLD: 0 % (ref 0–2)
BILIRUB SERPL-MCNC: 0.7 MG/DL (ref 0.2–1.1)
BILIRUB UR QL: NEGATIVE
BUN SERPL-MCNC: 15 MG/DL (ref 6–23)
CALCIUM SERPL-MCNC: 9.7 MG/DL (ref 8.3–10.4)
CHLORIDE SERPL-SCNC: 96 MMOL/L (ref 98–107)
CO2 SERPL-SCNC: 30 MMOL/L (ref 21–32)
COLOR UR: YELLOW
CREAT SERPL-MCNC: 1.06 MG/DL (ref 0.8–1.5)
DIFFERENTIAL METHOD BLD: ABNORMAL
EOSINOPHIL # BLD: 0.2 K/UL (ref 0–0.8)
EOSINOPHIL NFR BLD: 2 % (ref 0.5–7.8)
ERYTHROCYTE [DISTWIDTH] IN BLOOD BY AUTOMATED COUNT: 12.6 % (ref 11.9–14.6)
GLOBULIN SER CALC-MCNC: 2.6 G/DL (ref 2.8–4.5)
GLUCOSE SERPL-MCNC: 162 MG/DL (ref 65–100)
GLUCOSE UR STRIP.AUTO-MCNC: NEGATIVE MG/DL
HCT VFR BLD AUTO: 50.3 % (ref 41.1–50.3)
HGB BLD-MCNC: 17.7 G/DL (ref 13.6–17.2)
HGB UR QL STRIP: NEGATIVE
IMM GRANULOCYTES # BLD AUTO: 0 K/UL (ref 0–0.5)
IMM GRANULOCYTES NFR BLD AUTO: 0 % (ref 0–5)
KETONES UR QL STRIP.AUTO: NEGATIVE MG/DL
LEUKOCYTE ESTERASE UR QL STRIP.AUTO: NEGATIVE
LIPASE SERPL-CCNC: 34 U/L (ref 13–60)
LYMPHOCYTES # BLD: 1.7 K/UL (ref 0.5–4.6)
LYMPHOCYTES NFR BLD: 18 % (ref 13–44)
MCH RBC QN AUTO: 31.9 PG (ref 26.1–32.9)
MCHC RBC AUTO-ENTMCNC: 35.2 G/DL (ref 31.4–35)
MCV RBC AUTO: 90.6 FL (ref 82–102)
MONOCYTES # BLD: 1 K/UL (ref 0.1–1.3)
MONOCYTES NFR BLD: 10 % (ref 4–12)
NEUTS SEG # BLD: 6.8 K/UL (ref 1.7–8.2)
NEUTS SEG NFR BLD: 70 % (ref 43–78)
NITRITE UR QL STRIP.AUTO: NEGATIVE
NRBC # BLD: 0 K/UL (ref 0–0.2)
PH UR STRIP: 6 (ref 5–9)
PLATELET # BLD AUTO: 291 K/UL (ref 150–450)
PMV BLD AUTO: 9.1 FL (ref 9.4–12.3)
POTASSIUM SERPL-SCNC: 4.2 MMOL/L (ref 3.5–5.1)
PROT SERPL-MCNC: 6.9 G/DL (ref 6.4–8.2)
PROT UR STRIP-MCNC: NEGATIVE MG/DL
RBC # BLD AUTO: 5.55 M/UL (ref 4.23–5.6)
SODIUM SERPL-SCNC: 136 MMOL/L (ref 133–143)
SP GR UR REFRACTOMETRY: 1.02 (ref 1–1.02)
UROBILINOGEN UR QL STRIP.AUTO: 1 EU/DL (ref 0.2–1)
WBC # BLD AUTO: 9.7 K/UL (ref 4.3–11.1)

## 2023-08-05 PROCEDURE — 96374 THER/PROPH/DIAG INJ IV PUSH: CPT

## 2023-08-05 PROCEDURE — 6360000004 HC RX CONTRAST MEDICATION: Performed by: EMERGENCY MEDICINE

## 2023-08-05 PROCEDURE — 99285 EMERGENCY DEPT VISIT HI MDM: CPT

## 2023-08-05 PROCEDURE — 2580000003 HC RX 258: Performed by: EMERGENCY MEDICINE

## 2023-08-05 PROCEDURE — 80053 COMPREHEN METABOLIC PANEL: CPT

## 2023-08-05 PROCEDURE — 6360000002 HC RX W HCPCS: Performed by: EMERGENCY MEDICINE

## 2023-08-05 PROCEDURE — 85025 COMPLETE CBC W/AUTO DIFF WBC: CPT

## 2023-08-05 PROCEDURE — 81003 URINALYSIS AUTO W/O SCOPE: CPT

## 2023-08-05 PROCEDURE — 83690 ASSAY OF LIPASE: CPT

## 2023-08-05 PROCEDURE — 96375 TX/PRO/DX INJ NEW DRUG ADDON: CPT

## 2023-08-05 PROCEDURE — 74177 CT ABD & PELVIS W/CONTRAST: CPT

## 2023-08-05 RX ORDER — 0.9 % SODIUM CHLORIDE 0.9 %
1000 INTRAVENOUS SOLUTION INTRAVENOUS
Status: COMPLETED | OUTPATIENT
Start: 2023-08-05 | End: 2023-08-05

## 2023-08-05 RX ORDER — SODIUM CHLORIDE 0.9 % (FLUSH) 0.9 %
10 SYRINGE (ML) INJECTION
Status: COMPLETED | OUTPATIENT
Start: 2023-08-05 | End: 2023-08-05

## 2023-08-05 RX ORDER — OMEPRAZOLE 40 MG/1
40 CAPSULE, DELAYED RELEASE ORAL
Qty: 30 CAPSULE | Refills: 0 | Status: SHIPPED | OUTPATIENT
Start: 2023-08-05

## 2023-08-05 RX ORDER — 0.9 % SODIUM CHLORIDE 0.9 %
100 INTRAVENOUS SOLUTION INTRAVENOUS
Status: COMPLETED | OUTPATIENT
Start: 2023-08-05 | End: 2023-08-05

## 2023-08-05 RX ORDER — HYOSCYAMINE SULFATE 0.125 MG
125 TABLET ORAL EVERY 4 HOURS PRN
Qty: 20 TABLET | Refills: 0 | Status: SHIPPED | OUTPATIENT
Start: 2023-08-05

## 2023-08-05 RX ORDER — MORPHINE SULFATE 4 MG/ML
4 INJECTION INTRAVENOUS ONCE
Status: COMPLETED | OUTPATIENT
Start: 2023-08-05 | End: 2023-08-05

## 2023-08-05 RX ORDER — ONDANSETRON 2 MG/ML
4 INJECTION INTRAMUSCULAR; INTRAVENOUS
Status: COMPLETED | OUTPATIENT
Start: 2023-08-05 | End: 2023-08-05

## 2023-08-05 RX ORDER — ONDANSETRON 8 MG/1
8 TABLET, ORALLY DISINTEGRATING ORAL EVERY 8 HOURS PRN
Qty: 12 TABLET | Refills: 0 | Status: SHIPPED | OUTPATIENT
Start: 2023-08-05

## 2023-08-05 RX ADMIN — MORPHINE SULFATE 4 MG: 4 INJECTION, SOLUTION INTRAMUSCULAR; INTRAVENOUS at 10:57

## 2023-08-05 RX ADMIN — IOPAMIDOL 100 ML: 755 INJECTION, SOLUTION INTRAVENOUS at 11:04

## 2023-08-05 RX ADMIN — SODIUM CHLORIDE 1000 ML: 9 INJECTION, SOLUTION INTRAVENOUS at 10:57

## 2023-08-05 RX ADMIN — SODIUM CHLORIDE, PRESERVATIVE FREE 10 ML: 5 INJECTION INTRAVENOUS at 11:04

## 2023-08-05 RX ADMIN — SODIUM CHLORIDE 100 ML: 9 INJECTION, SOLUTION INTRAVENOUS at 11:04

## 2023-08-05 RX ADMIN — ONDANSETRON 4 MG: 2 INJECTION INTRAMUSCULAR; INTRAVENOUS at 10:57

## 2023-08-05 ASSESSMENT — ENCOUNTER SYMPTOMS
COLOR CHANGE: 0
CHEST TIGHTNESS: 0
VOICE CHANGE: 0
RECTAL PAIN: 0
TROUBLE SWALLOWING: 0
VOMITING: 0
DIARRHEA: 0
ABDOMINAL PAIN: 1
COUGH: 0
PHOTOPHOBIA: 0
EYE REDNESS: 0
FLATUS: 0
BACK PAIN: 0
APNEA: 0

## 2023-08-05 ASSESSMENT — PAIN DESCRIPTION - LOCATION
LOCATION: ABDOMEN
LOCATION: ABDOMEN

## 2023-08-05 ASSESSMENT — PAIN DESCRIPTION - ORIENTATION: ORIENTATION: MID;UPPER

## 2023-08-05 ASSESSMENT — PAIN DESCRIPTION - DESCRIPTORS: DESCRIPTORS: ACHING

## 2023-08-05 ASSESSMENT — PAIN DESCRIPTION - FREQUENCY: FREQUENCY: CONTINUOUS

## 2023-08-05 ASSESSMENT — PAIN SCALES - GENERAL
PAINLEVEL_OUTOF10: 10
PAINLEVEL_OUTOF10: 5

## 2023-08-05 ASSESSMENT — PAIN DESCRIPTION - PAIN TYPE: TYPE: ACUTE PAIN

## 2023-08-05 ASSESSMENT — PAIN - FUNCTIONAL ASSESSMENT
PAIN_FUNCTIONAL_ASSESSMENT: ACTIVITIES ARE NOT PREVENTED
PAIN_FUNCTIONAL_ASSESSMENT: 0-10

## 2023-08-05 ASSESSMENT — LIFESTYLE VARIABLES: HOW MANY STANDARD DRINKS CONTAINING ALCOHOL DO YOU HAVE ON A TYPICAL DAY: PATIENT DOES NOT DRINK

## 2023-08-05 NOTE — ED TRIAGE NOTES
Pt states that he just got out of detox. On Thursday ate some pork that was not properly cooked. C/o severe intermittent mid epigastric pain.   No nausea or vomiting

## 2023-08-05 NOTE — ED PROVIDER NOTES
Emergency Department Provider Note       PCP: PEDRO Carbajal NP   Age: 50 y.o. Sex: male     DISPOSITION Decision To Discharge 08/05/2023 12:14:51 PM       ICD-10-CM    1. Abdominal pain, unspecified abdominal location  R10.9       2. Gastroenteritis  K52.9           Medical Decision Making     Complexity of Problems Addressed:  1 or more acute illnesses that pose a threat to life or bodily function. Data Reviewed and Analyzed:  Category 1:   I independently ordered and reviewed each unique test.  I reviewed external records: ED visit note from an outside group. I reviewed external records: provider visit note from PCP. I reviewed external records: previous lab results from outside ED. I reviewed external records: previous imaging study including radiologist interpretation. Category 2:   I independently interpreted the cardiac monitor rhythm strip normal sinus rhythm. I interpreted the CT Scan no acute obstruction noted, no perforation or abscess. Category 3: Discussion of management or test interpretation. Tenderness in the central as well as left hemiabdomen. Differential diagnosis is broad. Plan for screening labs. 12:12 PM EDT  Laboratory data here is reassuring. CT scan of abdomen pelvis shows inflammation around antrum of stomach as well as small intestine consistent with gastroenteritis. Moderate volume ascites noted. Which is consistent with patient known alcoholism and alcoholic liver disease. More importantly patient voices improvement in symptoms. Plan to discharge home with gastroenteritis precautions. Risk of Complications and/or Morbidity of Patient Management:  Prescription drug management performed. Parental controlled substances given in the ED. Shared medical decision making was utilized in creating the patients health plan today.          History      Ace Fabian is a 50 y.o. male who presents to the Emergency Department with chief complaint Range    Sodium 136 133 - 143 mmol/L    Potassium 4.2 3.5 - 5.1 mmol/L    Chloride 96 (L) 98 - 107 mmol/L    CO2 30 21 - 32 mmol/L    Anion Gap 10 2 - 11 mmol/L    Glucose 162 (H) 65 - 100 mg/dL    BUN 15 6 - 23 MG/DL    Creatinine 1.06 0.8 - 1.5 MG/DL    Est, Glom Filt Rate >60 >60 ml/min/1.73m2    Calcium 9.7 8.3 - 10.4 MG/DL    Total Bilirubin 0.7 0.2 - 1.1 MG/DL    ALT 28 13.0 - 61.0 U/L    AST 28 15 - 37 U/L    Alk Phosphatase 102 45.0 - 117.0 U/L    Total Protein 6.9 6.4 - 8.2 g/dL    Albumin 4.3 3.5 - 5.0 g/dL    Globulin 2.6 (L) 2.8 - 4.5 g/dL    Albumin/Globulin Ratio 1.7 (H) 0.4 - 1.6     Lipase   Result Value Ref Range    Lipase 34 13 - 60 U/L   Urinalysis w rflx microscopic   Result Value Ref Range    Color, UA YELLOW      Appearance CLEAR      Specific Gravity, UA 1.020 1.001 - 1.023      pH, Urine 6.0 5.0 - 9.0      Protein, UA Negative NEG mg/dL    Glucose, UA Negative mg/dL    Ketones, Urine Negative NEG mg/dL    Bilirubin Urine Negative NEG      Blood, Urine Negative NEG      Urobilinogen, Urine 1.0 0.2 - 1.0 EU/dL    Nitrite, Urine Negative NEG      Leukocyte Esterase, Urine Negative NEG          CT ABDOMEN PELVIS W IV CONTRAST Additional Contrast? None   Final Result   1. Inflammation of the antrum of the stomach and multiple small bowel loops is   most in keeping with gastroenteritis. No bowel perforation or intra-abdominal   abscess evident. 2. Small volume ascites is present. 3. Circumferential wall thickening of the bladder. An associated cystitis could   also be considered. Voice dictation software was used during the making of this note. This software is not perfect and grammatical and other typographical errors may be present. This note has not been completely proofread for errors.      Nathan Curtis MD  08/05/23 5005

## 2023-10-19 ENCOUNTER — OFFICE VISIT (OUTPATIENT)
Dept: NEUROLOGY | Age: 48
End: 2023-10-19
Payer: COMMERCIAL

## 2023-10-19 VITALS
SYSTOLIC BLOOD PRESSURE: 122 MMHG | BODY MASS INDEX: 26.92 KG/M2 | WEIGHT: 188 LBS | DIASTOLIC BLOOD PRESSURE: 84 MMHG | HEIGHT: 70 IN

## 2023-10-19 DIAGNOSIS — M54.81 OCCIPITAL NEURALGIA OF LEFT SIDE: ICD-10-CM

## 2023-10-19 DIAGNOSIS — G43.709 CHRONIC MIGRAINE WITHOUT AURA WITHOUT STATUS MIGRAINOSUS, NOT INTRACTABLE: Primary | ICD-10-CM

## 2023-10-19 DIAGNOSIS — I63.9 CEREBROVASCULAR ACCIDENT (CVA), UNSPECIFIED MECHANISM (HCC): ICD-10-CM

## 2023-10-19 PROCEDURE — 3079F DIAST BP 80-89 MM HG: CPT | Performed by: PSYCHIATRY & NEUROLOGY

## 2023-10-19 PROCEDURE — 3074F SYST BP LT 130 MM HG: CPT | Performed by: PSYCHIATRY & NEUROLOGY

## 2023-10-19 PROCEDURE — 99215 OFFICE O/P EST HI 40 MIN: CPT | Performed by: PSYCHIATRY & NEUROLOGY

## 2023-10-19 RX ORDER — OXCARBAZEPINE 150 MG/1
150 TABLET, FILM COATED ORAL 2 TIMES DAILY
Qty: 180 TABLET | Refills: 3 | Status: SHIPPED | OUTPATIENT
Start: 2023-10-19

## 2023-10-19 ASSESSMENT — ENCOUNTER SYMPTOMS
GASTROINTESTINAL NEGATIVE: 1
ALLERGIC/IMMUNOLOGIC NEGATIVE: 1
RESPIRATORY NEGATIVE: 1

## 2023-10-19 NOTE — PROGRESS NOTES
[facility], [address] [facility] [address], [address]  Phone: [phone] Fax [phone]  Dr. Homer Santiago      10/19/2023  Nata Flores     Patient is referred by the following provider for consultation regarding as below:       I reviewed the available records and notes and have examined patient with the following findings:     Chief Complaint:  Chief Complaint   Patient presents with    New Patient     Pt has strokes 13 months ago and another small stroke in June          HPI: This is a right handed 50 y.o.  male doing unbelievably well he is now 80 days alcohol free and to keep his time he has been exercising and working out and doing phenomenal.  He was seen by Dr. Raina Godinez and this is a second opinion he has left occipital neuralgia but what he describes as bilateral occipital neuralgia. And she put him on Trileptal 150 twice a day which works dramatically well and is headaches because he also sees these more his chronic headaches instead of occipital neuralgia his headaches are now once every 2 weeks and Nurtec 75 mg works extremely well for him. He has had occipital nerve blocks in the past.  But he has been doing better than he ever has. Unfortunately last year on September he he came to with acute episode of left-sided numbness and tingling the CTAs did show a short segment of basilar artery occlusion that reconstitute distally. The MRI of the brain was negative for an acute stroke. EEG was normal he did receive tPA at that time Teresita as endovascular team evaluated the patient and recommended Plavix aspirin and Lipitor which is what he was on when he after that stroke. He also went through alcohol withdrawal or was treated for. But he has not been drinking ever since. He is doing remarkably well. He is lost weight he is stronger because he has been exercising.   In June of this year 2023 he had a second episode where he woke up and could not remember the

## 2023-11-29 ENCOUNTER — TELEPHONE (OUTPATIENT)
Dept: NEUROLOGY | Age: 48
End: 2023-11-29

## 2023-11-29 NOTE — TELEPHONE ENCOUNTER
Mr. Flores requested Dr. Lucero to write him a letter giving him clearance to work. The letter needs to state that the stroke he had does not hinder his performance. 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 Patient asked to have the letter emailed to him at [email or web address]

## 2024-07-05 ENCOUNTER — TELEPHONE (OUTPATIENT)
Dept: NEUROLOGY | Age: 49
End: 2024-07-05

## 2024-07-05 PROBLEM — I63.9 CEREBROVASCULAR ACCIDENT (CVA) (HCC): Status: RESOLVED | Noted: 2022-09-19 | Resolved: 2024-07-05

## 2024-07-05 PROBLEM — N30.00 ACUTE CYSTITIS WITHOUT HEMATURIA: Status: RESOLVED | Noted: 2022-09-21 | Resolved: 2024-07-05

## 2024-08-28 ENCOUNTER — APPOINTMENT (OUTPATIENT)
Dept: GENERAL RADIOLOGY | Age: 49
End: 2024-08-28
Payer: COMMERCIAL

## 2024-08-28 ENCOUNTER — APPOINTMENT (OUTPATIENT)
Dept: CT IMAGING | Age: 49
End: 2024-08-28
Payer: COMMERCIAL

## 2024-08-28 ENCOUNTER — APPOINTMENT (OUTPATIENT)
Dept: MRI IMAGING | Age: 49
End: 2024-08-28
Payer: COMMERCIAL

## 2024-08-28 ENCOUNTER — HOSPITAL ENCOUNTER (EMERGENCY)
Age: 49
Discharge: HOME | End: 2024-08-28
Attending: EMERGENCY MEDICINE
Payer: COMMERCIAL

## 2024-08-28 VITALS
HEIGHT: 70 IN | SYSTOLIC BLOOD PRESSURE: 143 MMHG | TEMPERATURE: 97.8 F | RESPIRATION RATE: 16 BRPM | BODY MASS INDEX: 27.2 KG/M2 | OXYGEN SATURATION: 98 % | DIASTOLIC BLOOD PRESSURE: 83 MMHG | WEIGHT: 190 LBS | HEART RATE: 74 BPM

## 2024-08-28 DIAGNOSIS — G43.109 MIGRAINE WITH AURA AND WITHOUT STATUS MIGRAINOSUS, NOT INTRACTABLE: Primary | ICD-10-CM

## 2024-08-28 LAB
ALBUMIN SERPL-MCNC: 3.3 G/DL (ref 3.5–5)
ALBUMIN/GLOB SERPL: 1.3 (ref 1–1.9)
ALP SERPL-CCNC: 70 U/L (ref 40–129)
ALT SERPL-CCNC: 75 U/L (ref 12–65)
ANION GAP SERPL CALC-SCNC: 13 MMOL/L (ref 9–18)
AST SERPL-CCNC: 49 U/L (ref 15–37)
BASOPHILS # BLD: 0.1 K/UL (ref 0–0.2)
BASOPHILS NFR BLD: 1 %
BILIRUB SERPL-MCNC: 0.3 MG/DL (ref 0–1.2)
BUN SERPL-MCNC: 9 MG/DL (ref 6–23)
CALCIUM SERPL-MCNC: 8.6 MG/DL (ref 8.8–10.2)
CHLORIDE SERPL-SCNC: 103 MMOL/L (ref 98–107)
CO2 SERPL-SCNC: 22 MMOL/L (ref 20–28)
CREAT SERPL-MCNC: 0.88 MG/DL (ref 0.8–1.3)
EKG ATRIAL RATE: 73 BPM
EKG DIAGNOSIS: NORMAL
EKG P AXIS: -18 DEGREES
EKG P-R INTERVAL: 118 MS
EKG Q-T INTERVAL: 393 MS
EKG QRS DURATION: 95 MS
EKG QTC CALCULATION (BAZETT): 433 MS
EKG R AXIS: 2 DEGREES
EKG T AXIS: 0 DEGREES
EKG VENTRICULAR RATE: 73 BPM
EOSINOPHIL # BLD: 0.1 K/UL (ref 0–0.8)
EOSINOPHIL NFR BLD: 2 % (ref 1–8)
ERYTHROCYTE [DISTWIDTH] IN BLOOD BY AUTOMATED COUNT: 12.9 % (ref 11.9–14.6)
GLOBULIN SER CALC-MCNC: 2.6 G/DL (ref 2.3–3.5)
GLUCOSE BLD STRIP.AUTO-MCNC: 164 MG/DL (ref 65–100)
GLUCOSE SERPL-MCNC: 162 MG/DL (ref 70–99)
HCT VFR BLD AUTO: 43.7 % (ref 41.1–50.3)
HGB BLD-MCNC: 15.4 G/DL (ref 13.6–17.2)
INR BLD: 1.2 (ref 0.9–1.2)
INR PPP: 1
LYMPHOCYTES # BLD: 1.3 K/UL (ref 0.5–4.6)
LYMPHOCYTES NFR BLD MANUAL: 19 % (ref 16–44)
MCH RBC QN AUTO: 30.9 PG (ref 26.1–32.9)
MCHC RBC AUTO-ENTMCNC: 35.2 G/DL (ref 31.4–35)
MCV RBC AUTO: 87.8 FL (ref 82–102)
MONOCYTES # BLD: 0.6 K/UL (ref 0.1–1.3)
MONOCYTES NFR BLD: 9 %
NEUTS SEG # BLD: 4.9 K/UL (ref 1.7–8.2)
NEUTS SEG NFR BLD MANUAL: 69 % (ref 47–75)
NRBC # BLD: 0 K/UL (ref 0–0.2)
P2Y12 PLT RESPONSE: 113 PRU
PLATELET # BLD AUTO: 250 K/UL (ref 150–450)
PLATELET COMMENT: ABNORMAL
PMV BLD AUTO: 9.6 FL (ref 9.4–12.3)
POTASSIUM SERPL-SCNC: 4 MMOL/L (ref 3.5–5.1)
PROT SERPL-MCNC: 6 G/DL (ref 6.3–8.2)
PROTHROMBIN TIME: 13.1 SEC (ref 11.3–14.9)
PT BLD: 14 SECS (ref 9.6–11.6)
RBC # BLD AUTO: 4.98 M/UL (ref 4.23–5.6)
SERVICE CMNT-IMP: ABNORMAL
SODIUM SERPL-SCNC: 138 MMOL/L (ref 136–145)
TROPONIN T SERPL HS-MCNC: 9 NG/L (ref 0–22)
WBC # BLD AUTO: 7 K/UL (ref 4.3–11.1)
WBC MORPH BLD: ABNORMAL

## 2024-08-28 PROCEDURE — 96374 THER/PROPH/DIAG INJ IV PUSH: CPT

## 2024-08-28 PROCEDURE — 99285 EMERGENCY DEPT VISIT HI MDM: CPT

## 2024-08-28 PROCEDURE — 80053 COMPREHEN METABOLIC PANEL: CPT

## 2024-08-28 PROCEDURE — 96375 TX/PRO/DX INJ NEW DRUG ADDON: CPT

## 2024-08-28 PROCEDURE — 71045 X-RAY EXAM CHEST 1 VIEW: CPT

## 2024-08-28 PROCEDURE — 6360000002 HC RX W HCPCS: Performed by: EMERGENCY MEDICINE

## 2024-08-28 PROCEDURE — 70498 CT ANGIOGRAPHY NECK: CPT

## 2024-08-28 PROCEDURE — 93010 ELECTROCARDIOGRAM REPORT: CPT | Performed by: INTERNAL MEDICINE

## 2024-08-28 PROCEDURE — 85576 BLOOD PLATELET AGGREGATION: CPT

## 2024-08-28 PROCEDURE — 6360000004 HC RX CONTRAST MEDICATION: Performed by: EMERGENCY MEDICINE

## 2024-08-28 PROCEDURE — 93005 ELECTROCARDIOGRAM TRACING: CPT | Performed by: EMERGENCY MEDICINE

## 2024-08-28 PROCEDURE — 85610 PROTHROMBIN TIME: CPT

## 2024-08-28 PROCEDURE — 70450 CT HEAD/BRAIN W/O DYE: CPT

## 2024-08-28 PROCEDURE — 70551 MRI BRAIN STEM W/O DYE: CPT

## 2024-08-28 PROCEDURE — 85025 COMPLETE CBC W/AUTO DIFF WBC: CPT

## 2024-08-28 PROCEDURE — 99233 SBSQ HOSP IP/OBS HIGH 50: CPT | Performed by: PSYCHIATRY & NEUROLOGY

## 2024-08-28 PROCEDURE — 84484 ASSAY OF TROPONIN QUANT: CPT

## 2024-08-28 PROCEDURE — 82962 GLUCOSE BLOOD TEST: CPT

## 2024-08-28 RX ORDER — IOPAMIDOL 755 MG/ML
50 INJECTION, SOLUTION INTRAVASCULAR
Status: COMPLETED | OUTPATIENT
Start: 2024-08-28 | End: 2024-08-28

## 2024-08-28 RX ORDER — KETOROLAC TROMETHAMINE 30 MG/ML
30 INJECTION, SOLUTION INTRAMUSCULAR; INTRAVENOUS
Status: COMPLETED | OUTPATIENT
Start: 2024-08-28 | End: 2024-08-28

## 2024-08-28 RX ORDER — PROCHLORPERAZINE EDISYLATE 5 MG/ML
10 INJECTION INTRAMUSCULAR; INTRAVENOUS
Status: COMPLETED | OUTPATIENT
Start: 2024-08-28 | End: 2024-08-28

## 2024-08-28 RX ORDER — DIPHENHYDRAMINE HYDROCHLORIDE 50 MG/ML
12.5 INJECTION INTRAMUSCULAR; INTRAVENOUS
Status: COMPLETED | OUTPATIENT
Start: 2024-08-28 | End: 2024-08-28

## 2024-08-28 RX ADMIN — IOPAMIDOL 50 ML: 755 INJECTION, SOLUTION INTRAVENOUS at 11:14

## 2024-08-28 RX ADMIN — PROCHLORPERAZINE EDISYLATE 10 MG: 5 INJECTION INTRAMUSCULAR; INTRAVENOUS at 14:10

## 2024-08-28 RX ADMIN — KETOROLAC TROMETHAMINE 30 MG: 30 INJECTION, SOLUTION INTRAMUSCULAR at 14:11

## 2024-08-28 RX ADMIN — DIPHENHYDRAMINE HYDROCHLORIDE 12.5 MG: 50 INJECTION INTRAMUSCULAR; INTRAVENOUS at 14:11

## 2024-08-28 ASSESSMENT — PAIN - FUNCTIONAL ASSESSMENT: PAIN_FUNCTIONAL_ASSESSMENT: NONE - DENIES PAIN

## 2024-08-28 ASSESSMENT — PAIN SCALES - GENERAL: PAINLEVEL_OUTOF10: 6

## 2024-08-28 ASSESSMENT — PAIN DESCRIPTION - LOCATION: LOCATION: HEAD

## 2024-08-28 NOTE — ACP (ADVANCE CARE PLANNING)
Advance Care Planning     Advance Care Planning Activator (Inpatient)  Conversation Note      Date of ACP Conversation: 8/28/2024     Conversation Conducted with: Patient with Decision Making Capacity    ACP Activator: Angela Humphries, MSW        Health Care Decision Maker:     Current Designated Health Care Decision Maker:     Primary Decision Maker: Brittani Flores - Spouse - [phone]  Click here to complete Healthcare Decision Makers including section of the Healthcare Decision Maker Relationship (ie \"Primary\")  Today we documented Decision Maker(s) consistent with Legal Next of Kin hierarchy.    Care Preferences    Patient states that he does not have HCPOA documents, names his wife Brittani as he primary decision maker and legal next-of-kin.

## 2024-08-28 NOTE — CARE COORDINATION
SW met with patient who confirmed demographic information, states that he lives with his wife and has other local support. Patient is insured and established with primary care. Patient does not use assistive devices to ambulate, denies any falls, and is independent in his ADLs. CMI, Milestones, and ACP completed.        08/28/24 1341   Service Assessment   Patient Orientation Alert and Oriented;Person;Place;Situation;Self   Cognition Alert   History Provided By Patient   Primary Caregiver Self   Support Systems Spouse/Significant Other   Patient's Healthcare Decision Maker is: Legal Next of Kin   PCP Verified by CM Yes   Prior Functional Level Independent in ADLs/IADLs   Current Functional Level Independent in ADLs/IADLs   Can patient return to prior living arrangement Yes   Ability to make needs known: Good   Family able to assist with home care needs: Yes   Would you like for me to discuss the discharge plan with any other family members/significant others, and if so, who? Yes  (Wife)   Financial Resources Other (Comment)  (BCBS)   Community Resources None   Discharge Planning   Patient expects to be discharged to: Dru Humphries, ANNAMARIA  Medical Social Worker  [facility]

## 2024-08-28 NOTE — ED PROVIDER NOTES
Type MANUAL     Comprehensive Metabolic Panel w/ Reflex to MG   Result Value Ref Range    Sodium 138 136 - 145 mmol/L    Potassium 4.0 3.5 - 5.1 mmol/L    Chloride 103 98 - 107 mmol/L    CO2 22 20 - 28 mmol/L    Anion Gap 13 9 - 18 mmol/L    Glucose 162 (H) 70 - 99 mg/dL    BUN 9 6 - 23 MG/DL    Creatinine 0.88 0.80 - 1.30 MG/DL    Est, Glom Filt Rate >90 >60 ml/min/1.73m2    Calcium 8.6 (L) 8.8 - 10.2 MG/DL    Total Bilirubin 0.3 0.0 - 1.2 MG/DL    ALT 75 (H) 12 - 65 U/L    AST 49 (H) 15 - 37 U/L    Alk Phosphatase 70 40 - 129 U/L    Total Protein 6.0 (L) 6.3 - 8.2 g/dL    Albumin 3.3 (L) 3.5 - 5.0 g/dL    Globulin 2.6 2.3 - 3.5 g/dL    Albumin/Globulin Ratio 1.3 1.0 - 1.9     Troponin   Result Value Ref Range    Troponin T 9.0 0 - 22 ng/L   Protime-INR   Result Value Ref Range    Protime 13.1 11.3 - 14.9 sec    INR 1.0     POCT INR   Result Value Ref Range    POC Protime 14.0 (H) 9.6 - 11.6 SECS    POC INR 1.2 0.9 - 1.2     POCT Glucose   Result Value Ref Range    POC Glucose 164 (H) 65 - 100 mg/dL    Performed by: Salomón    EKG 12 Lead   Result Value Ref Range    Ventricular Rate 73 BPM    Atrial Rate 73 BPM    P-R Interval 118 ms    QRS Duration 95 ms    Q-T Interval 393 ms    QTc Calculation (Bazett) 433 ms    P Axis -18 degrees    R Axis 2 degrees    T Axis 0 degrees    Diagnosis       Sinus rhythm      Confirmed by Donell Sroenson MD ([id number]) on 8/28/2024 12:37:36 PM           MRI BRAIN WO CONTRAST   Final Result      1.  No acute evidence of ischemia..      Electronically signed by Rochelle Tate      XR CHEST PORTABLE   Final Result   Findings/impression: Low lung volumes accentuate the bronchovascular markings.   Likely mild basilar atelectasis. Otherwise no acute cardiopulmonary finding.   Consider dedicated PA/lateral chest when clinically feasible.      Electronically signed by Tay Sewell      CT HEAD WO CONTRAST   Final Result      1.  No acute intracranial abnormality.      Findings  discussed with Dr. Guy at 1153 hours on 8/28/2024.      Electronically signed by Rochelle Tate      CTA HEAD NECK W CONTRAST   Final Result      1. 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      2. Short segment occlusion or high-grade stenosis of the intracranial left   vertebral artery near the posterior inferior cerebellar artery origin.      3. Other findings as described.      NOTIFICATION: Dr. Sydney Higginbotham discussed critical finding(s) and recommendation(s)   by text through [employer] and by direct verbal communication with Dr. Demetrio Guy at 8/28/2024 12:15 PM and 12:28 PM respectively.       Electronically signed by Sydney Higginbotham         NIH Stroke Scale  Interval: Baseline  Level of Consciousness (1a): Alert  LOC Questions (1b): Answers both correctly  LOC Commands (1c): Performs both tasks correctly  Best Gaze (2): Normal  Visual (3): No visual loss  Facial Palsy (4): Normal symmetrical movement  Motor Arm, Left (5a): Drift, but does not hit bed  Motor Arm, Right (5b): No drift  Motor Leg, Left (6a): Drift, but does not hit bed  Motor Leg, Right (6b): No drift  Limb Ataxia (7): Absent  Sensory (8): (!) Mild to Moderate  Best Language (9): No aphasia  Dysarthria (10): Normal  Extinction and Inattention (11): No abnormality  Total: 3         No results for input(s): \"COVID19\" in the last 72 hours.    Voice dictation software was used during the making of this note.  This software is not perfect and grammatical and other typographical errors may be present.  This note has not been completely proofread for errors.     Demetrio Guy, DO  08/28/24 1522

## 2024-08-28 NOTE — ED NOTES
Pt stating that his left sided facial and leg numbness has worsened again.  Pt also complaining of a headache. NIH repeated and provider informed

## 2024-08-28 NOTE — CONSULTS
0  11-Extinction/Inattention 0    Lab Results   Component Value Date/Time    CHOL 172 09/19/2022 06:25 PM    HDL 36 09/19/2022 06:25 PM    LDL 89.6 09/19/2022 06:25 PM    VLDL 46.4 09/19/2022 06:25 PM        CT personally reviewed. Results do not populate into note.    Most recent MRI   Results for orders placed during the hospital encounter of 06/15/23    MRI BRAIN WO CONTRAST    Narrative  EXAMINATION: MRI BRAIN WO CONTRAST 6/15/2023 1:31 PM    ACCESSION NUMBER: [id number]    COMPARISON: MRI brain 9/20/2022. CT head 6/15/2023.    INDICATION: 47-year-old male with altered mental status, history of CVA in 2022.  Pupils abnormal and right-sided facial droop.    TECHNIQUE: Multiplanar multisequence MRI of the brain without the administration  of intravenous contrast.    FINDINGS:    No midline shift. There are a couple of tiny foci of T2/FLAIR hyperintensity  within the subcortical white matter which are unchanged from prior examination,  nonspecific. No evidence of acute intracranial hemorrhage or acute infarct. No  cerebral or cerebellar mass. No diffusion weighted signal abnormality is seen.  The ventricles are within normal limits in size and configuration. No  extra-axial fluid collections.    Impression  No acute or subacute appearing intracranial abnormality.         Most recent Echo  Results for orders placed during the hospital encounter of 09/19/22    Transthoracic echocardiogram (TTE) complete with contrast, bubble, strain, and 3D PRN    Interpretation Summary    Left Ventricle: Normal left ventricular systolic function with a visually estimated EF of 55 - 60%. Left ventricle size is normal. Normal wall thickness. Normal wall motion. Normal diastolic function.    Interatrial Septum: Agitated saline study was negative with and without provocation.    Technical qualifiers: Color flow Doppler was performed and pulse wave and/or continuous wave Doppler was performed.         Most recent lipid panels  Lab

## 2024-10-21 ENCOUNTER — OFFICE VISIT (OUTPATIENT)
Dept: NEUROLOGY | Age: 49
End: 2024-10-21
Payer: COMMERCIAL

## 2024-10-21 VITALS
SYSTOLIC BLOOD PRESSURE: 142 MMHG | WEIGHT: 203 LBS | HEIGHT: 70 IN | DIASTOLIC BLOOD PRESSURE: 86 MMHG | HEART RATE: 67 BPM | BODY MASS INDEX: 29.06 KG/M2

## 2024-10-21 DIAGNOSIS — G45.9 TIA (TRANSIENT ISCHEMIC ATTACK): ICD-10-CM

## 2024-10-21 DIAGNOSIS — M54.81 OCCIPITAL NEURALGIA OF LEFT SIDE: ICD-10-CM

## 2024-10-21 DIAGNOSIS — G43.709 CHRONIC MIGRAINE WITHOUT AURA WITHOUT STATUS MIGRAINOSUS, NOT INTRACTABLE: Primary | ICD-10-CM

## 2024-10-21 PROCEDURE — 3077F SYST BP >= 140 MM HG: CPT | Performed by: PSYCHIATRY & NEUROLOGY

## 2024-10-21 PROCEDURE — 99214 OFFICE O/P EST MOD 30 MIN: CPT | Performed by: PSYCHIATRY & NEUROLOGY

## 2024-10-21 PROCEDURE — 3079F DIAST BP 80-89 MM HG: CPT | Performed by: PSYCHIATRY & NEUROLOGY

## 2024-10-21 RX ORDER — SEMAGLUTIDE 0.68 MG/ML
INJECTION, SOLUTION SUBCUTANEOUS
COMMUNITY
Start: 2024-10-15

## 2024-10-21 RX ORDER — OXCARBAZEPINE 150 MG/1
150 TABLET, FILM COATED ORAL 2 TIMES DAILY
Qty: 180 TABLET | Refills: 3 | Status: SHIPPED | OUTPATIENT
Start: 2024-10-21

## 2024-10-21 RX ORDER — RIMEGEPANT SULFATE 75 MG/75MG
1 TABLET, ORALLY DISINTEGRATING ORAL DAILY PRN
Qty: 16 TABLET | Refills: 5 | Status: SHIPPED | OUTPATIENT
Start: 2024-10-21

## 2024-10-21 ASSESSMENT — ENCOUNTER SYMPTOMS
ALLERGIC/IMMUNOLOGIC NEGATIVE: 1
GASTROINTESTINAL NEGATIVE: 1
RESPIRATORY NEGATIVE: 1

## 2024-10-21 NOTE — PROGRESS NOTES
[Macadamia Nut Oil] Anaphylaxis    Fluoxetine Shortness Of Breath and Hives    Gabapentin      Headaches and shaking    Penicillins Hives    Coconut Fatty Acid Hives, Swelling and Rash              Review of Systems:  Review of Systems   Respiratory: Negative.     Cardiovascular: Negative.    Gastrointestinal: Negative.    Endocrine: Negative.    Genitourinary: Negative.    Musculoskeletal: Negative.    Allergic/Immunologic: Negative.    Neurological: Negative.    Hematological: Negative.    Psychiatric/Behavioral: Negative.        Failed to redirect to the Timeline version of the Tripwire SmartLink.  Failed to redirect to the Timeline version of the Tripwire SmartLink.       Vitals:    10/21/24 0737   BP: (!) 142/86   Pulse: 67   Weight: 92.1 kg (203 lb)   Height: 1.778 m (5' 10\")        Physical Exam  Constitutional:       Appearance: Normal appearance.   HENT:      Head: Normocephalic and atraumatic.   Eyes:      Extraocular Movements: Extraocular movements intact and EOM normal.      Pupils: Pupils are equal, round, and reactive to light.   Cardiovascular:      Rate and Rhythm: Normal rate.      Pulses: Normal pulses.   Pulmonary:      Effort: Pulmonary effort is normal.   Abdominal:      General: Abdomen is flat.   Neurological:      Mental Status: He is alert and oriented to person, place, and time.      Gait: Gait is intact.      Deep Tendon Reflexes:      Reflex Scores:       Tricep reflexes are 1+ on the right side and 1+ on the left side.       Bicep reflexes are 1+ on the right side and 1+ on the left side.       Brachioradialis reflexes are 1+ on the right side and 1+ on the left side.       Patellar reflexes are 1+ on the right side and 1+ on the left side.       Achilles reflexes are 1+ on the right side and 1+ on the left side.         Neurologic Exam     Mental Status   Oriented to person, place, and time.   Attention: normal. Concentration: normal.   Level of consciousness: alert  Knowledge: good.

## 2024-10-22 ENCOUNTER — TELEPHONE (OUTPATIENT)
Dept: NEUROLOGY | Age: 49
End: 2024-10-22

## 2024-10-22 NOTE — TELEPHONE ENCOUNTER
Patient needs new letter for work stating he needs to wear tinted safety glasses and utilize blue screen on monitors. His  24.

## 2024-11-11 ENCOUNTER — TELEPHONE (OUTPATIENT)
Dept: NEUROLOGY | Age: 49
End: 2024-11-11

## 2024-11-11 NOTE — TELEPHONE ENCOUNTER
Patient requesting a letter for his tinted windshield due to migraines. He has one for his tinted glasses at work but asking for one for his windshield to be mailed.

## 2025-07-06 ENCOUNTER — HOSPITAL ENCOUNTER (EMERGENCY)
Age: 50
Discharge: ANOTHER HOSPITAL | End: 2025-07-06
Attending: EMERGENCY MEDICINE
Payer: COMMERCIAL

## 2025-07-06 ENCOUNTER — APPOINTMENT (OUTPATIENT)
Dept: CT IMAGING | Age: 50
End: 2025-07-06
Payer: COMMERCIAL

## 2025-07-06 ENCOUNTER — HOSPITAL ENCOUNTER (OUTPATIENT)
Age: 50
Setting detail: OBSERVATION
Discharge: HOME | End: 2025-07-07
Attending: INTERNAL MEDICINE | Admitting: HOSPITALIST
Payer: COMMERCIAL

## 2025-07-06 ENCOUNTER — APPOINTMENT (OUTPATIENT)
Dept: GENERAL RADIOLOGY | Age: 50
End: 2025-07-06
Payer: COMMERCIAL

## 2025-07-06 VITALS
OXYGEN SATURATION: 99 % | DIASTOLIC BLOOD PRESSURE: 81 MMHG | SYSTOLIC BLOOD PRESSURE: 132 MMHG | HEART RATE: 65 BPM | HEIGHT: 70 IN | WEIGHT: 200.18 LBS | RESPIRATION RATE: 13 BRPM | BODY MASS INDEX: 28.66 KG/M2 | TEMPERATURE: 97.9 F

## 2025-07-06 DIAGNOSIS — G45.9 TIA (TRANSIENT ISCHEMIC ATTACK): ICD-10-CM

## 2025-07-06 DIAGNOSIS — I65.1 BASILAR ARTERY OCCLUSION: ICD-10-CM

## 2025-07-06 DIAGNOSIS — I63.9 CEREBROVASCULAR ACCIDENT (CVA), UNSPECIFIED MECHANISM (HCC): Primary | ICD-10-CM

## 2025-07-06 DIAGNOSIS — R07.9 CHEST PAIN, UNSPECIFIED TYPE: ICD-10-CM

## 2025-07-06 DIAGNOSIS — R29.898 LEFT ARM WEAKNESS: Primary | ICD-10-CM

## 2025-07-06 PROBLEM — E11.9 DIABETES MELLITUS, TYPE 2 (HCC): Status: ACTIVE | Noted: 2025-07-06

## 2025-07-06 LAB
ALBUMIN SERPL-MCNC: 4.2 G/DL (ref 3.5–5)
ALBUMIN/GLOB SERPL: 1.4 (ref 1–1.9)
ALP SERPL-CCNC: 94 U/L (ref 40–129)
ALT SERPL-CCNC: 64 U/L (ref 12–65)
AMPHET UR QL SCN: NEGATIVE
ANION GAP SERPL CALC-SCNC: 12 MMOL/L (ref 7–16)
APPEARANCE UR: CLEAR
AST SERPL-CCNC: 64 U/L (ref 15–37)
BARBITURATES UR QL SCN: NEGATIVE
BASOPHILS # BLD: 0.01 K/UL (ref 0–0.2)
BASOPHILS NFR BLD: 0.2 % (ref 0–2)
BENZODIAZ UR QL: NEGATIVE
BILIRUB SERPL-MCNC: 0.4 MG/DL (ref 0–1.2)
BILIRUB UR QL: NEGATIVE
BUN SERPL-MCNC: 10 MG/DL (ref 6–23)
CALCIUM SERPL-MCNC: 9.1 MG/DL (ref 8.8–10.2)
CANNABINOIDS UR QL SCN: NEGATIVE
CHLORIDE SERPL-SCNC: 103 MMOL/L (ref 98–107)
CHP ED QC CHECK: YES
CO2 SERPL-SCNC: 25 MMOL/L (ref 20–29)
COCAINE UR QL SCN: NEGATIVE
COLOR UR: YELLOW
CREAT SERPL-MCNC: 0.98 MG/DL (ref 0.8–1.3)
DIFFERENTIAL METHOD BLD: ABNORMAL
EOSINOPHIL # BLD: 0.11 K/UL (ref 0–0.8)
EOSINOPHIL NFR BLD: 1.8 % (ref 0.5–7.8)
ERYTHROCYTE [DISTWIDTH] IN BLOOD BY AUTOMATED COUNT: 13 % (ref 11.9–14.6)
ETHANOL SERPL-MCNC: <10 MG/DL (ref 0–0.08)
GLOBULIN SER CALC-MCNC: 3 G/DL (ref 2.3–3.5)
GLUCOSE BLD STRIP.AUTO-MCNC: 183 MG/DL (ref 65–100)
GLUCOSE BLD-MCNC: 183 MG/DL
GLUCOSE SERPL-MCNC: 196 MG/DL (ref 65–100)
GLUCOSE UR STRIP.AUTO-MCNC: NEGATIVE MG/DL
HCT VFR BLD AUTO: 44.1 % (ref 41.1–50.3)
HGB BLD-MCNC: 15.5 G/DL (ref 13.6–17.2)
HGB UR QL STRIP: NEGATIVE
IMM GRANULOCYTES # BLD AUTO: 0.02 K/UL (ref 0–0.5)
IMM GRANULOCYTES NFR BLD AUTO: 0.3 % (ref 0–5)
INR PPP: 1.1
KETONES UR QL STRIP.AUTO: NEGATIVE MG/DL
LEUKOCYTE ESTERASE UR QL STRIP.AUTO: NEGATIVE
LYMPHOCYTES # BLD: 1.59 K/UL (ref 0.5–4.6)
LYMPHOCYTES NFR BLD: 26.7 % (ref 13–44)
Lab: NORMAL
MCH RBC QN AUTO: 30.6 PG (ref 26.1–32.9)
MCHC RBC AUTO-ENTMCNC: 35.1 G/DL (ref 31.4–35)
MCV RBC AUTO: 87 FL (ref 82–102)
METHADONE UR QL: NEGATIVE
MONOCYTES # BLD: 0.45 K/UL (ref 0.1–1.3)
MONOCYTES NFR BLD: 7.6 % (ref 4–12)
NEUTS SEG # BLD: 3.78 K/UL (ref 1.7–8.2)
NEUTS SEG NFR BLD: 63.4 % (ref 43–78)
NITRITE UR QL STRIP.AUTO: NEGATIVE
NRBC # BLD: 0 K/UL (ref 0–0.2)
OPIATES UR QL: NEGATIVE
PCP UR QL: NEGATIVE
PH UR STRIP: 7 (ref 5–9)
PLATELET # BLD AUTO: 290 K/UL (ref 150–450)
PMV BLD AUTO: 9.4 FL (ref 9.4–12.3)
POTASSIUM SERPL-SCNC: 4.8 MMOL/L (ref 3.5–5.1)
PROT SERPL-MCNC: 7.2 G/DL (ref 6.3–8.2)
PROT UR STRIP-MCNC: NEGATIVE MG/DL
PROTHROMBIN TIME: 14 SEC (ref 11.3–14.9)
RBC # BLD AUTO: 5.07 M/UL (ref 4.23–5.6)
SERVICE CMNT-IMP: ABNORMAL
SODIUM SERPL-SCNC: 140 MMOL/L (ref 133–143)
SP GR UR REFRACTOMETRY: 1.01 (ref 1–1.02)
TROPONIN T SERPL HS-MCNC: 7.5 NG/L (ref 0–22)
TROPONIN T SERPL HS-MCNC: 8 NG/L (ref 0–22)
UROBILINOGEN UR QL STRIP.AUTO: 0.2 EU/DL (ref 0.2–1)
WBC # BLD AUTO: 6 K/UL (ref 4.3–11.1)

## 2025-07-06 PROCEDURE — 85025 COMPLETE CBC W/AUTO DIFF WBC: CPT

## 2025-07-06 PROCEDURE — 85610 PROTHROMBIN TIME: CPT

## 2025-07-06 PROCEDURE — 80307 DRUG TEST PRSMV CHEM ANLYZR: CPT

## 2025-07-06 PROCEDURE — 2500000003 HC RX 250 WO HCPCS: Performed by: HOSPITALIST

## 2025-07-06 PROCEDURE — 70450 CT HEAD/BRAIN W/O DYE: CPT

## 2025-07-06 PROCEDURE — 94760 N-INVAS EAR/PLS OXIMETRY 1: CPT

## 2025-07-06 PROCEDURE — 70498 CT ANGIOGRAPHY NECK: CPT

## 2025-07-06 PROCEDURE — 6370000000 HC RX 637 (ALT 250 FOR IP): Performed by: HOSPITALIST

## 2025-07-06 PROCEDURE — 99285 EMERGENCY DEPT VISIT HI MDM: CPT

## 2025-07-06 PROCEDURE — 93005 ELECTROCARDIOGRAM TRACING: CPT | Performed by: EMERGENCY MEDICINE

## 2025-07-06 PROCEDURE — 6370000000 HC RX 637 (ALT 250 FOR IP): Performed by: EMERGENCY MEDICINE

## 2025-07-06 PROCEDURE — 6360000004 HC RX CONTRAST MEDICATION: Performed by: EMERGENCY MEDICINE

## 2025-07-06 PROCEDURE — 71045 X-RAY EXAM CHEST 1 VIEW: CPT

## 2025-07-06 PROCEDURE — 82962 GLUCOSE BLOOD TEST: CPT

## 2025-07-06 PROCEDURE — 81003 URINALYSIS AUTO W/O SCOPE: CPT

## 2025-07-06 PROCEDURE — 84484 ASSAY OF TROPONIN QUANT: CPT

## 2025-07-06 PROCEDURE — 82077 ASSAY SPEC XCP UR&BREATH IA: CPT

## 2025-07-06 PROCEDURE — G0378 HOSPITAL OBSERVATION PER HR: HCPCS

## 2025-07-06 PROCEDURE — G0379 DIRECT REFER HOSPITAL OBSERV: HCPCS

## 2025-07-06 PROCEDURE — 80053 COMPREHEN METABOLIC PANEL: CPT

## 2025-07-06 RX ORDER — IBUPROFEN 600 MG/1
1 TABLET ORAL PRN
Status: DISCONTINUED | OUTPATIENT
Start: 2025-07-06 | End: 2025-07-07 | Stop reason: HOSPADM

## 2025-07-06 RX ORDER — OMEPRAZOLE 20 MG/1
20 CAPSULE, DELAYED RELEASE ORAL DAILY
COMMUNITY
Start: 2025-06-29

## 2025-07-06 RX ORDER — ONDANSETRON 2 MG/ML
4 INJECTION INTRAMUSCULAR; INTRAVENOUS EVERY 6 HOURS PRN
Status: DISCONTINUED | OUTPATIENT
Start: 2025-07-06 | End: 2025-07-07 | Stop reason: HOSPADM

## 2025-07-06 RX ORDER — POLYETHYLENE GLYCOL 3350 17 G/17G
17 POWDER, FOR SOLUTION ORAL DAILY PRN
Status: DISCONTINUED | OUTPATIENT
Start: 2025-07-06 | End: 2025-07-07 | Stop reason: HOSPADM

## 2025-07-06 RX ORDER — SODIUM CHLORIDE 0.9 % (FLUSH) 0.9 %
5-40 SYRINGE (ML) INJECTION PRN
Status: DISCONTINUED | OUTPATIENT
Start: 2025-07-06 | End: 2025-07-07 | Stop reason: HOSPADM

## 2025-07-06 RX ORDER — ENOXAPARIN SODIUM 100 MG/ML
40 INJECTION SUBCUTANEOUS DAILY
Status: DISCONTINUED | OUTPATIENT
Start: 2025-07-07 | End: 2025-07-07 | Stop reason: HOSPADM

## 2025-07-06 RX ORDER — DEXTROSE MONOHYDRATE 100 MG/ML
INJECTION, SOLUTION INTRAVENOUS CONTINUOUS PRN
Status: DISCONTINUED | OUTPATIENT
Start: 2025-07-06 | End: 2025-07-07 | Stop reason: HOSPADM

## 2025-07-06 RX ORDER — GLIPIZIDE 5 MG/1
5 TABLET, FILM COATED, EXTENDED RELEASE ORAL 2 TIMES DAILY
COMMUNITY
Start: 2025-06-29

## 2025-07-06 RX ORDER — CLOPIDOGREL BISULFATE 75 MG/1
300 TABLET ORAL
Status: COMPLETED | OUTPATIENT
Start: 2025-07-06 | End: 2025-07-06

## 2025-07-06 RX ORDER — LOSARTAN POTASSIUM 25 MG/1
25 TABLET ORAL DAILY
Status: DISCONTINUED | OUTPATIENT
Start: 2025-07-07 | End: 2025-07-07 | Stop reason: HOSPADM

## 2025-07-06 RX ORDER — ASPIRIN 81 MG/1
81 TABLET ORAL DAILY
Status: DISCONTINUED | OUTPATIENT
Start: 2025-07-07 | End: 2025-07-07 | Stop reason: HOSPADM

## 2025-07-06 RX ORDER — CLOPIDOGREL BISULFATE 75 MG/1
75 TABLET ORAL DAILY
Status: DISCONTINUED | OUTPATIENT
Start: 2025-07-07 | End: 2025-07-07 | Stop reason: HOSPADM

## 2025-07-06 RX ORDER — SODIUM CHLORIDE 9 MG/ML
INJECTION, SOLUTION INTRAVENOUS PRN
Status: DISCONTINUED | OUTPATIENT
Start: 2025-07-06 | End: 2025-07-07 | Stop reason: HOSPADM

## 2025-07-06 RX ORDER — IOPAMIDOL 755 MG/ML
100 INJECTION, SOLUTION INTRAVASCULAR
Status: COMPLETED | OUTPATIENT
Start: 2025-07-06 | End: 2025-07-06

## 2025-07-06 RX ORDER — TRAZODONE HYDROCHLORIDE 50 MG/1
100 TABLET ORAL NIGHTLY
Status: DISCONTINUED | OUTPATIENT
Start: 2025-07-06 | End: 2025-07-07 | Stop reason: HOSPADM

## 2025-07-06 RX ORDER — GLIPIZIDE 5 MG/1
5 TABLET ORAL
Status: DISCONTINUED | OUTPATIENT
Start: 2025-07-07 | End: 2025-07-07

## 2025-07-06 RX ORDER — ONDANSETRON 4 MG/1
4 TABLET, ORALLY DISINTEGRATING ORAL EVERY 8 HOURS PRN
Status: DISCONTINUED | OUTPATIENT
Start: 2025-07-06 | End: 2025-07-07 | Stop reason: HOSPADM

## 2025-07-06 RX ORDER — ASPIRIN 81 MG/1
324 TABLET, CHEWABLE ORAL
Status: COMPLETED | OUTPATIENT
Start: 2025-07-06 | End: 2025-07-06

## 2025-07-06 RX ORDER — ATORVASTATIN CALCIUM 40 MG/1
80 TABLET, FILM COATED ORAL NIGHTLY
Status: DISCONTINUED | OUTPATIENT
Start: 2025-07-06 | End: 2025-07-07 | Stop reason: HOSPADM

## 2025-07-06 RX ORDER — OXCARBAZEPINE 150 MG/1
150 TABLET, FILM COATED ORAL 2 TIMES DAILY
Status: DISCONTINUED | OUTPATIENT
Start: 2025-07-06 | End: 2025-07-07 | Stop reason: HOSPADM

## 2025-07-06 RX ORDER — SODIUM CHLORIDE 0.9 % (FLUSH) 0.9 %
5-40 SYRINGE (ML) INJECTION EVERY 12 HOURS SCHEDULED
Status: DISCONTINUED | OUTPATIENT
Start: 2025-07-06 | End: 2025-07-07 | Stop reason: HOSPADM

## 2025-07-06 RX ADMIN — OXCARBAZEPINE 150 MG: 150 TABLET, FILM COATED ORAL at 23:51

## 2025-07-06 RX ADMIN — ASPIRIN 324 MG: 81 TABLET, CHEWABLE ORAL at 18:05

## 2025-07-06 RX ADMIN — TRAZODONE HYDROCHLORIDE 100 MG: 50 TABLET ORAL at 23:51

## 2025-07-06 RX ADMIN — IOPAMIDOL 100 ML: 755 INJECTION, SOLUTION INTRAVENOUS at 16:16

## 2025-07-06 RX ADMIN — CLOPIDOGREL BISULFATE 300 MG: 75 TABLET, FILM COATED ORAL at 18:05

## 2025-07-06 RX ADMIN — SODIUM CHLORIDE, PRESERVATIVE FREE 10 ML: 5 INJECTION INTRAVENOUS at 23:52

## 2025-07-06 ASSESSMENT — PAIN DESCRIPTION - LOCATION
LOCATION: ARM
LOCATION: HEAD

## 2025-07-06 ASSESSMENT — PAIN DESCRIPTION - ORIENTATION: ORIENTATION: ANTERIOR

## 2025-07-06 ASSESSMENT — PAIN - FUNCTIONAL ASSESSMENT
PAIN_FUNCTIONAL_ASSESSMENT: 0-10
PAIN_FUNCTIONAL_ASSESSMENT: NONE - DENIES PAIN

## 2025-07-06 ASSESSMENT — PAIN SCALES - GENERAL
PAINLEVEL_OUTOF10: 5
PAINLEVEL_OUTOF10: 3
PAINLEVEL_OUTOF10: 0

## 2025-07-06 ASSESSMENT — PAIN DESCRIPTION - DESCRIPTORS: DESCRIPTORS: DISCOMFORT

## 2025-07-06 ASSESSMENT — LIFESTYLE VARIABLES
HOW MANY STANDARD DRINKS CONTAINING ALCOHOL DO YOU HAVE ON A TYPICAL DAY: 3 OR 4
HOW OFTEN DO YOU HAVE A DRINK CONTAINING ALCOHOL: 2-3 TIMES A WEEK

## 2025-07-06 NOTE — ED NOTES
TRANSFER - OUT REPORT:    Verbal report given to BERTIN Boone on Manjit Flores  being transferred to [facility] room 341 for routine progression of patient care       Report consisted of patient's Situation, Background, Assessment and   Recommendations(SBAR).     Information from the following report(s) Nurse Handoff Report, ED Encounter Summary, ED SBAR, MAR, and Cardiac Rhythm NSR was reviewed with the receiving nurse.    [place] Fall Assessment:    Presents to emergency department  because of falls (Syncope, seizure, or loss of consciousness): No  Age > 70: No  Altered Mental Status, Intoxication with alcohol or substance confusion (Disorientation, impaired judgment, poor safety awaremess, or inability to follow instructions): No  Impaired Mobility: Ambulates or transfers with assistive devices or assistance; Unable to ambulate or transer.: No  Nursing Judgement: No          Lines:   Peripheral IV 07/06/25 Left Antecubital (Active)   Site Assessment Clean, dry & intact 07/06/25 1621   Line Status Blood return noted;Specimen collected;Flushed;Normal saline locked 07/06/25 1621   Phlebitis Assessment No symptoms 07/06/25 1621   Infiltration Assessment 0 07/06/25 1621   Dressing Status New dressing applied;Clean, dry & intact 07/06/25 1621        Opportunity for questions and clarification was provided.      Patient transported with:  Monitor

## 2025-07-06 NOTE — PROGRESS NOTES
TRANSFER - IN REPORT:    Verbal report received from neptali on Manjit Flores  being received from [facility] for routine progression of patient care      Report consisted of patient's Situation, Background, Assessment and   Recommendations(SBAR).     Information from the following report(s) Nurse Handoff Report, ED Encounter Summary, ED SBAR, MAR, Recent Results, Neuro Assessment, and Event Log was reviewed with the receiving nurse.    Opportunity for questions and clarification was provided.      Assessment completed upon patient's arrival to unit and care assumed.

## 2025-07-06 NOTE — H&P
6.3 - 8.2 g/dL    Albumin 4.2 3.5 - 5.0 g/dL    Globulin 3.0 2.3 - 3.5 g/dL    Albumin/Globulin Ratio 1.4 1.0 - 1.9     Ethanol    Collection Time: 07/06/25  4:05 PM   Result Value Ref Range    Ethanol Lvl <10 (H) 0 MG/DL   POCT Glucose    Collection Time: 07/06/25  4:06 PM   Result Value Ref Range    POC Glucose 183 (H) 65 - 100 mg/dL    Performed by: Akbar    POCT Glucose    Collection Time: 07/06/25  4:21 PM   Result Value Ref Range    Glucose 183 mg/dL    QC OK? yes    Troponin now then q90 min for 2 occurances    Collection Time: 07/06/25  6:07 PM   Result Value Ref Range    Troponin T 8.0 0 - 22 ng/L   Urinalysis    Collection Time: 07/06/25  6:12 PM   Result Value Ref Range    Color, UA YELLOW      Appearance CLEAR      Specific Gravity, UA 1.010 1.001 - 1.023      pH, Urine 7.0 5.0 - 9.0      Protein, UA Negative NEG mg/dL    Glucose, Ur Negative NEG mg/dL    Ketones, Urine Negative NEG mg/dL    Bilirubin, Urine Negative NEG      Blood, Urine Negative NEG      Urobilinogen, Urine 0.2 0.2 - 1.0 EU/dL    Nitrite, Urine Negative NEG      Leukocyte Esterase, Urine Negative NEG     Urine Drug Screen    Collection Time: 07/06/25  6:12 PM   Result Value Ref Range    Phencyclidine, Urine Negative NEG      Benzodiazepines, Urine Negative NEG      Cocaine, Urine Negative NEG      Amphetamine, Urine Negative NEG      Methadone, Urine Negative NEG      THC, TH-Cannabinol, Urine Negative NEG      Opiates, Urine Negative NEG      Barbiturates, Urine Negative NEG      Drug Screen Comment:        Specimen analysis was performed without chain of custody handling. These results should be used for medical purposes only and not for legal or employment purposes. Unconfirmed screening results must not be used for non-medical purposes.       No results for input(s): \"COVID19\" in the last 72 hours.    XR CHEST PORTABLE  Result Date: 7/6/2025  Chest X-ray INDICATION: CP COMPARISON: 28 August 2024 TECHNIQUE: AP view of the

## 2025-07-06 NOTE — ED TRIAGE NOTES
Ambulatory to triage, gait slow, steady. Pt c/o CP onset today, also Left arm weakness and pain on entire left side. Pt states he ws disoriented last nght. Patient states he has hx stroke and this feels like last time.

## 2025-07-06 NOTE — ED PROVIDER NOTES
Emergency Department Provider Note       S EMERGENCY DEPT   PCP: Shital Carter APRN - NP   Age: 49 y.o.   Sex: male     DISPOSITION Decision To Transfer 07/06/2025 05:47:04 PM    ICD-10-CM    1. Left arm weakness  R29.898       2. Chest pain, unspecified type  R07.9           Medical Decision Making     Patient is a 49-year-old male with a history of DM2, HTN, CVA November 2022 with residual left lower extremity weakness and mild left upper extremity weakness per patient, history of TIA June 2023 with disorientation at that time now presents with disorientation started approximate 11 PM patient went to bed and then woke up this morning at 6 AM with left upper extremity weakness.  Patient states left lower extremity weakness is at baseline.  Patient had no slurred speech.  Patient did have some unsteady gait.  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    Differential diagnosis includes but is not limited to CVA, intracranial hemorrhage, ACS    Patient's physical exam is as stated.  Patient's laboratory testing troponin negative which is reassuring.  Patient CT head and CTA head and neck shows no acute findings.  I did speak with the teleneurologist and she recommended admission stroke workup aspirin and Plavix load and aspirin 81 mg daily as well as Plavix 75 mg daily.  Recommend MRI.  ED Course as of 07/06/25 1753   Sun Jul 06, 2025   1607 Patient evaluated and stroke alert initiated upon my evaluation. [SH]      ED Course User Index  [SH] Ca Menjivar MD     1 or more acute illnesses that pose a threat to life or bodily function.   Shared medical decision making was utilized in creating the patients health plan today.  I independently ordered and reviewed each unique test.       The patients assessment required an independent historian: Wife.  The reason they were needed is important historical information not provided by the patient.  ED cardiac monitoring rhythm strip was ordered

## 2025-07-07 ENCOUNTER — APPOINTMENT (OUTPATIENT)
Dept: MRI IMAGING | Age: 50
End: 2025-07-07
Attending: INTERNAL MEDICINE
Payer: COMMERCIAL

## 2025-07-07 ENCOUNTER — APPOINTMENT (OUTPATIENT)
Dept: NON INVASIVE DIAGNOSTICS | Age: 50
End: 2025-07-07
Attending: HOSPITALIST
Payer: COMMERCIAL

## 2025-07-07 ENCOUNTER — APPOINTMENT (OUTPATIENT)
Dept: CT IMAGING | Age: 50
End: 2025-07-07
Attending: INTERNAL MEDICINE
Payer: COMMERCIAL

## 2025-07-07 VITALS
SYSTOLIC BLOOD PRESSURE: 120 MMHG | HEIGHT: 70 IN | BODY MASS INDEX: 28.63 KG/M2 | HEART RATE: 95 BPM | WEIGHT: 200 LBS | TEMPERATURE: 98.1 F | DIASTOLIC BLOOD PRESSURE: 80 MMHG | RESPIRATION RATE: 16 BRPM | OXYGEN SATURATION: 95 %

## 2025-07-07 LAB
ANION GAP SERPL CALC-SCNC: 14 MMOL/L (ref 7–16)
BUN SERPL-MCNC: 10 MG/DL (ref 6–23)
CALCIUM SERPL-MCNC: 8.2 MG/DL (ref 8.8–10.2)
CHLORIDE SERPL-SCNC: 104 MMOL/L (ref 98–107)
CHOLEST SERPL-MCNC: 162 MG/DL (ref 0–200)
CO2 SERPL-SCNC: 22 MMOL/L (ref 20–29)
CREAT SERPL-MCNC: 1.12 MG/DL (ref 0.8–1.3)
EKG ATRIAL RATE: 113 BPM
EKG DIAGNOSIS: NORMAL
EKG P AXIS: 48 DEGREES
EKG P-R INTERVAL: 122 MS
EKG Q-T INTERVAL: 388 MS
EKG QRS DURATION: 76 MS
EKG QTC CALCULATION (BAZETT): 532 MS
EKG R AXIS: 3 DEGREES
EKG T AXIS: 27 DEGREES
EKG VENTRICULAR RATE: 113 BPM
ERYTHROCYTE [DISTWIDTH] IN BLOOD BY AUTOMATED COUNT: 13.2 % (ref 11.9–14.6)
EST. AVERAGE GLUCOSE BLD GHB EST-MCNC: 144 MG/DL
GLUCOSE BLD STRIP.AUTO-MCNC: 174 MG/DL (ref 65–100)
GLUCOSE BLD STRIP.AUTO-MCNC: 220 MG/DL (ref 65–100)
GLUCOSE BLD STRIP.AUTO-MCNC: 78 MG/DL (ref 65–100)
GLUCOSE SERPL-MCNC: 216 MG/DL (ref 70–99)
HBA1C MFR BLD: 6.6 % (ref 0–5.6)
HCT VFR BLD AUTO: 38.8 % (ref 41.1–50.3)
HDLC SERPL-MCNC: 30 MG/DL (ref 40–60)
HDLC SERPL: 5.3 (ref 0–5)
HGB BLD-MCNC: 13.4 G/DL (ref 13.6–17.2)
LDLC SERPL CALC-MCNC: 79 MG/DL (ref 0–100)
MCH RBC QN AUTO: 30.4 PG (ref 26.1–32.9)
MCHC RBC AUTO-ENTMCNC: 34.5 G/DL (ref 31.4–35)
MCV RBC AUTO: 88 FL (ref 82–102)
NRBC # BLD: 0 K/UL (ref 0–0.2)
PLATELET # BLD AUTO: 240 K/UL (ref 150–450)
PMV BLD AUTO: 9.4 FL (ref 9.4–12.3)
POTASSIUM SERPL-SCNC: 3.7 MMOL/L (ref 3.5–5.1)
RBC # BLD AUTO: 4.41 M/UL (ref 4.23–5.6)
SERVICE CMNT-IMP: ABNORMAL
SERVICE CMNT-IMP: ABNORMAL
SERVICE CMNT-IMP: NORMAL
SODIUM SERPL-SCNC: 140 MMOL/L (ref 136–145)
TRIGL SERPL-MCNC: 264 MG/DL (ref 0–150)
VLDLC SERPL CALC-MCNC: 53 MG/DL (ref 6–23)
WBC # BLD AUTO: 5.8 K/UL (ref 4.3–11.1)

## 2025-07-07 PROCEDURE — 96366 THER/PROPH/DIAG IV INF ADDON: CPT

## 2025-07-07 PROCEDURE — G0378 HOSPITAL OBSERVATION PER HR: HCPCS

## 2025-07-07 PROCEDURE — 92523 SPEECH SOUND LANG COMPREHEN: CPT

## 2025-07-07 PROCEDURE — 92610 EVALUATE SWALLOWING FUNCTION: CPT

## 2025-07-07 PROCEDURE — 80048 BASIC METABOLIC PNL TOTAL CA: CPT

## 2025-07-07 PROCEDURE — 70450 CT HEAD/BRAIN W/O DYE: CPT

## 2025-07-07 PROCEDURE — 97165 OT EVAL LOW COMPLEX 30 MIN: CPT

## 2025-07-07 PROCEDURE — 2580000003 HC RX 258: Performed by: HOSPITALIST

## 2025-07-07 PROCEDURE — 96375 TX/PRO/DX INJ NEW DRUG ADDON: CPT

## 2025-07-07 PROCEDURE — 97161 PT EVAL LOW COMPLEX 20 MIN: CPT

## 2025-07-07 PROCEDURE — 36415 COLL VENOUS BLD VENIPUNCTURE: CPT

## 2025-07-07 PROCEDURE — 6360000002 HC RX W HCPCS: Performed by: INTERNAL MEDICINE

## 2025-07-07 PROCEDURE — 82962 GLUCOSE BLOOD TEST: CPT

## 2025-07-07 PROCEDURE — 70551 MRI BRAIN STEM W/O DYE: CPT

## 2025-07-07 PROCEDURE — 6370000000 HC RX 637 (ALT 250 FOR IP): Performed by: HOSPITALIST

## 2025-07-07 PROCEDURE — 80061 LIPID PANEL: CPT

## 2025-07-07 PROCEDURE — 6360000002 HC RX W HCPCS: Performed by: HOSPITALIST

## 2025-07-07 PROCEDURE — 93010 ELECTROCARDIOGRAM REPORT: CPT | Performed by: INTERNAL MEDICINE

## 2025-07-07 PROCEDURE — 83036 HEMOGLOBIN GLYCOSYLATED A1C: CPT

## 2025-07-07 PROCEDURE — 2500000003 HC RX 250 WO HCPCS: Performed by: INTERNAL MEDICINE

## 2025-07-07 PROCEDURE — 96365 THER/PROPH/DIAG IV INF INIT: CPT

## 2025-07-07 PROCEDURE — 2500000003 HC RX 250 WO HCPCS: Performed by: HOSPITALIST

## 2025-07-07 PROCEDURE — 85027 COMPLETE CBC AUTOMATED: CPT

## 2025-07-07 PROCEDURE — 96372 THER/PROPH/DIAG INJ SC/IM: CPT

## 2025-07-07 RX ORDER — OXYCODONE HYDROCHLORIDE 5 MG/1
5 TABLET ORAL EVERY 6 HOURS PRN
Refills: 0 | Status: DISCONTINUED | OUTPATIENT
Start: 2025-07-07 | End: 2025-07-07 | Stop reason: HOSPADM

## 2025-07-07 RX ORDER — ATORVASTATIN CALCIUM 80 MG/1
80 TABLET, FILM COATED ORAL NIGHTLY
Qty: 90 TABLET | Refills: 3 | Status: SHIPPED | OUTPATIENT
Start: 2025-07-07

## 2025-07-07 RX ORDER — ASPIRIN 81 MG/1
81 TABLET ORAL DAILY
Qty: 30 TABLET | Refills: 3 | Status: SHIPPED | OUTPATIENT
Start: 2025-07-08

## 2025-07-07 RX ORDER — DIPHENHYDRAMINE HYDROCHLORIDE 50 MG/ML
25 INJECTION, SOLUTION INTRAMUSCULAR; INTRAVENOUS ONCE
Status: COMPLETED | OUTPATIENT
Start: 2025-07-07 | End: 2025-07-07

## 2025-07-07 RX ORDER — GLIPIZIDE 5 MG/1
5 TABLET ORAL
Status: DISCONTINUED | OUTPATIENT
Start: 2025-07-07 | End: 2025-07-07 | Stop reason: HOSPADM

## 2025-07-07 RX ORDER — CLOPIDOGREL BISULFATE 75 MG/1
75 TABLET ORAL DAILY
Qty: 21 TABLET | Refills: 0 | Status: SHIPPED | OUTPATIENT
Start: 2025-07-08 | End: 2025-07-29

## 2025-07-07 RX ADMIN — GLIPIZIDE 5 MG: 5 TABLET ORAL at 00:25

## 2025-07-07 RX ADMIN — GLIPIZIDE 5 MG: 5 TABLET ORAL at 07:43

## 2025-07-07 RX ADMIN — CLOPIDOGREL BISULFATE 75 MG: 75 TABLET, FILM COATED ORAL at 07:43

## 2025-07-07 RX ADMIN — ASPIRIN 81 MG: 81 TABLET, COATED ORAL at 07:43

## 2025-07-07 RX ADMIN — OXYCODONE 5 MG: 5 TABLET ORAL at 00:25

## 2025-07-07 RX ADMIN — ENOXAPARIN SODIUM 40 MG: 100 INJECTION SUBCUTANEOUS at 07:42

## 2025-07-07 RX ADMIN — SODIUM CHLORIDE: 0.9 INJECTION, SOLUTION INTRAVENOUS at 05:26

## 2025-07-07 RX ADMIN — OXYCODONE 5 MG: 5 TABLET ORAL at 07:43

## 2025-07-07 RX ADMIN — LOSARTAN POTASSIUM 25 MG: 25 TABLET, FILM COATED ORAL at 07:43

## 2025-07-07 RX ADMIN — LORAZEPAM 1 MG: 2 INJECTION INTRAMUSCULAR; INTRAVENOUS at 08:39

## 2025-07-07 RX ADMIN — OXCARBAZEPINE 150 MG: 150 TABLET, FILM COATED ORAL at 07:42

## 2025-07-07 RX ADMIN — SODIUM CHLORIDE, PRESERVATIVE FREE 10 ML: 5 INJECTION INTRAVENOUS at 07:43

## 2025-07-07 RX ADMIN — DIPHENHYDRAMINE HYDROCHLORIDE 25 MG: 50 INJECTION INTRAMUSCULAR; INTRAVENOUS at 05:17

## 2025-07-07 RX ADMIN — VALPROATE SODIUM 500 MG: 100 INJECTION, SOLUTION INTRAVENOUS at 05:27

## 2025-07-07 ASSESSMENT — PAIN DESCRIPTION - LOCATION
LOCATION: ARM
LOCATION: HEAD

## 2025-07-07 ASSESSMENT — PAIN SCALES - GENERAL
PAINLEVEL_OUTOF10: 6
PAINLEVEL_OUTOF10: 3
PAINLEVEL_OUTOF10: 7

## 2025-07-07 NOTE — CARE COORDINATION
Patient with discharge orders for today.  Referral made to [facility] therapy clinic for outpatient PT. No additional needs made known to CM. Patient has met all treatment goals and milestones for discharge. Family to provide transportation home. CM following until patient is discharged.        07/07/25 1483   Services At/After Discharge   Transition of Care Consult (CM Consult) Other  (Outpatient PT)   Services At/After Discharge PT;Outpatient    Resource Information Provided? No   Mode of Transport at Discharge Other (see comment)  (Family)   Confirm Follow Up Transport Family   Condition of Participation: Discharge Planning   The Plan for Transition of Care is related to the following treatment goals: Patient to discharge home and return to baseline level of function.   The Patient and/or Patient Representative was provided with a Choice of Provider? Patient   The Patient and/Or Patient Representative agree with the Discharge Plan? Yes   Freedom of Choice list was provided with basic dialogue that supports the patient's individualized plan of care/goals, treatment preferences, and shares the quality data associated with the providers?  Yes

## 2025-07-07 NOTE — ACP (ADVANCE CARE PLANNING)
Advance Care Planning   General Advance Care Planning (ACP) Conversation    Date of Conversation: 7/6/2025  Conducted with: Patient with Decision Making Capacity  Other persons present: Spouse Brittani Perez    Healthcare Decision Maker:    Primary Decision Maker: Brittani Flores - Spouse - [phone]    Today we documented Decision Maker(s) consistent with Legal Next of Kin hierarchy.  Content/Action Overview:  DECLINED ACP Conversation - will revisit periodically    Length of Voluntary ACP Conversation in minutes:  <16 minutes (Non-Billable)    Shad Redd RN

## 2025-07-07 NOTE — PROGRESS NOTES
SPEECH LANGUAGE PATHOLOGY: COMBINED Dysphagia and Language  Initial Assessment and Discharge    Acknowledge Order  I  Therapy Time  I   Charges     I  Rehab Caseload Tracker    NAME: Manjit Flores  : 1975  MRN: [id number]    ADMISSION DATE: 2025  PRIMARY DIAGNOSIS: Left arm weakness    ICD-10: Treatment Diagnosis: R13.12 Dysphagia, Oropharyngeal Phase    RECOMMENDATIONS   Diet:    Regular Consistency  Thin Liquids    Medication: as tolerated   Compensatory Swallowing Strategies:   Upright as possible for all oral intake   Therapeutic Intervention:   No additional speech therapy intervention indicated at this time.    Patient continues to require skilled intervention:  No. Please re-consult if new concerns arise.   mett   Anticipated Discharge Needs: No additional speech therapy is indicated.      ASSESSMENT    Dysphagia:Patient presents with oropharyngeal swallow function that is within normal limits. No signs or symptoms of dysphagia identified with thin liquids, mixed consistencies, or coarse solids. He reports a history of esophageal dysphagia, but denies any symptoms at at this time.     Cognitive Communication: Patient functioning at baseline. Appropriate word findings at the conversational level. Speech is 100% intelligible. He answers all questions appropriately and follows all commands. Good recall of recent and remote information. He and his wife deny any acute changes to speech, language, or cognitive linguistic abilities.     Recommendations: No additional speech therapy indicated at this time. If subtle changes are noted upon discharge, encouraged patient to notify PCP for OP speech referral. However, do not anticipate the need for any additional speech therapy follow up.     GENERAL    Subjective: Patient alert, oriented. Low BGL this morning and patient reports feeling \"off\".      Reason for Consult: CVA workup    History of Present Injury/Illness: Mr. Flores  has a past medical history

## 2025-07-07 NOTE — PROGRESS NOTES
ACUTE OCCUPATIONAL THERAPY GOALS:   (Developed with and agreed upon by patient and/or caregiver.)  EVAL ONLY    OCCUPATIONAL THERAPY Initial Assessment and Discharge       OT Visit Days: 1  Acknowledge Orders  Time  OT Charge Capture  Rehab Caseload Tracker      Manjit Floers is a 49 y.o. male   PRIMARY DIAGNOSIS: Left arm weakness  Left arm weakness [R29.898]       Reason for Referral: Generalized Muscle Weakness (M62.81)  Other lack of cordination (R27.8)  Observation: [place] / [place] / Product Type: *No Product type* /     ASSESSMENT:     REHAB RECOMMENDATIONS:   Recommendation to date pending progress:  Setting:  No further skilled occupational therapy after discharge from hospital    Equipment:    None     ASSESSMENT:  Manjit Flores is a 49 y.o. admitted for left arm weakness, stroke rule out. Patient had a previous CVA in 2022 with residual left sided weakness and had a TIA in 2023. He lives with his spouse and  reports prior level of function as independent with all ADLs, IADLs, and performs functional/community mobility without the use of an assistive device, works full time. Based on OT evaluation completed this date, his current level of function is independent for ADL tasks and functional mobility with no assistive device. He presents with L sided weakness, more weakness than his baseline and reports feeling a little unsteady on his feet yet no difficulty with ADL tasks. Patient presents close to his baseline level, no further skilled OT services recommended at this time.        [facility] AM-PAC™ “6 Clicks” Daily Activity Inpatient Short Form:    AM-PAC Daily Activity - Inpatient   How much help is needed for putting on and taking off regular lower body clothing?: None  How much help is needed for bathing (which includes washing, rinsing, drying)?: None  How much help is needed for toileting (which includes using toilet, bedpan, or urinal)?: None  How much help is

## 2025-07-07 NOTE — PROGRESS NOTES
ACUTE PHYSICAL THERAPY GOALS:   (Developed with and agreed upon by patient and/or caregiver.)  STG:  (1.)Mr. Flores will move from supine to sit and sit to supine  with INDEPENDENT within 2-3 treatment day(s).    (2.)Mr. Flores will transfer from bed to chair and chair to bed with INDEPENDENT using the least restrictive device within 2-3 treatment day(s).    (3.)Mr. Flores will ambulate with INDEPENDENT for 500 feet with the least restrictive device within 2-3 treatment day(s).     ______________________________________________________________________________     PHYSICAL THERAPY Initial Assessment  (Link to Caseload Tracking: PT Visit Days : 1  Acknowledge Orders  Time In/Out  PT Charge Capture  Rehab Caseload Tracker    Manjit Flores is a 49 y.o. male   PRIMARY DIAGNOSIS: Left arm weakness  Left arm weakness [R29.898]       Reason for Referral: Difficulty in walking, Not elsewhere classified (R26.2)  Other abnormalities of gait and mobility (R26.89)  Observation: Payor: [place] / [place] / Product Type: *No Product type* /     ASSESSMENT:     REHAB RECOMMENDATIONS:   Recommendation to date pending progress:  Setting:  To be determined: none versus OPPT    Equipment:    None     ASSESSMENT:  Mr. Flores was admitted with L UE weakness. He has a history of prior CVA  in 2022 with residual L sided weakness and TIA in 2023. At baseline, he is independent with gait (without an assistive device) and ADLs. He works full time and is an active . Lives with spouse.   Today, he performed all functional mobility with supervision. He reported that he felt a little unsteady and weaker on the L side than his baseline. He has decreased sensation L UE and L LE at baseline. Patient is interested in OPPT if needed. Will follow while here.        [facility] AM-PAC™ “6 Clicks” Basic Mobility Inpatient Short Form  AM-PAC Basic Mobility - Inpatient   How much help is needed turning from your back to your

## 2025-07-07 NOTE — PROGRESS NOTES
[facility]  [address] [place] [address]  Phone: [phone]             July 7, 2025    Patient: Manjit Flores   YOB: 1975   Date of Visit: 7/6/2025       To Whom It May Concern:    Manjit Flores was seen and treated in our facility  beginning 7/6/2025 until 7/7/2025. He may return to work on 7/9/2025.      Sincerely,       Monique Polanco RN         Signature:__________________________________

## 2025-07-07 NOTE — DISCHARGE SUMMARY
Hospitalist Discharge Summary   Admit Date:  2025  9:17 PM   DC Note date: 2025  Name:  Manjit Flores   Age:  49 y.o.  Sex:  male  :  1975   MRN:  [id number]   Room:  [facility]  PCP:  Shital Carter APRN - NP    Presenting Complaint: No chief complaint on file.     Initial Admission Diagnosis: Left arm weakness [R29.898]     Problem List for this Hospitalization (present on admission):    Principal Problem:    Left arm weakness  Active Problems:    Hypercholesterolemia    Mild bipolar disorder (HCC)    Alcohol abuse    Diabetes mellitus, type 2 (HCC)  Resolved Problems:    * No resolved hospital problems. *      Hospital Course:    Manjit Flores is a 49-year-old man with a history of type 2 diabetes, hypertension, and CVA with residual left lower extremity weakness who presented to the [facility] ED with left arm weakness after awakening from sleep.  Last known normal was around 11 PM the night before admission.  He reported some unsteady gait but denied any slurring of speech.  He was seen in the ED with CT of the head negative for acute changes and CTA of the head and neck showing stable appearing major vascular structures of the head and neck compared to prior with no new acute findings.  He was seen by teleneurology who recommended loading with aspirin and Plavix and admission for possible MRI to evaluate acute CVA.  Loading with aspirin and Plavix and admission for MRI to evaluate CVA.  He was admitted to the Hospital Medicine service at [facility].    He was monitored on telemetry without any events or arrhythmias noted.  It was noted that transthoracic echocardiogram had been performed in  and reported that the interatrial septum showed negative agitated saline study with and without provocation.  MRI of the brain was performed which showed no evidence of acute intracranial pathology but did show mild chronic ischemic white matter disease and right mastoiditis.

## 2025-07-07 NOTE — PLAN OF CARE
Problem: Chronic Conditions and Co-morbidities  Goal: Patient's chronic conditions and co-morbidity symptoms are monitored and maintained or improved  7/7/2025 1237 by Monique Polanco RN  Outcome: Progressing  7/7/2025 1237 by Monique Polanco RN  Outcome: Progressing  7/7/2025 0048 by Christie Ricardo RN  Outcome: Progressing  Flowsheets (Taken 7/6/2025 2228)  Care Plan - Patient's Chronic Conditions and Co-Morbidity Symptoms are Monitored and Maintained or Improved: Monitor and assess patient's chronic conditions and comorbid symptoms for stability, deterioration, or improvement     Problem: Discharge Planning  Goal: Discharge to home or other facility with appropriate resources  7/7/2025 1237 by Monique Polanco RN  Outcome: Progressing  7/7/2025 1237 by Monique Polanco RN  Outcome: Progressing  7/7/2025 0048 by Christie Ricardo RN  Outcome: Progressing  Flowsheets (Taken 7/6/2025 2228)  Discharge to home or other facility with appropriate resources: Identify barriers to discharge with patient and caregiver     Problem: Safety - Adult  Goal: Free from fall injury  7/7/2025 1237 by Monique Polanco RN  Outcome: Progressing  7/7/2025 1237 by Monique Polanco RN  Outcome: Progressing     Problem: Pain  Goal: Verbalizes/displays adequate comfort level or baseline comfort level  7/7/2025 1237 by Monique Polanco RN  Outcome: Progressing  7/7/2025 1237 by Monique Polanco RN  Outcome: Progressing

## 2025-07-07 NOTE — CARE COORDINATION
CASE MANAGEMENT ASSESSMENT NOTE    Patient is a 49 year old male with left arm weakness.      Patient assessment completed at bedside.  Patient presents to assessment alert and oriented, and answers questions appropriately.  He lives at home with spouse.  At baseline, he is independent with transfers.  Does not use assistive devices.  He has BDBS insurance.  Is established with PCP, PEDRO Cruz.      PT/OT/SLP evals ordered for patient.  Patient requests outpatient PT.  Patient is agreeable to have referral sent to [facility].  Referral sent.    At this time, anticipate patient to be discharged home.  Case management will continue to follow.  Please notify if there are any changes.     Attending Physician: Jose Diamond MD  Admit Problem: Left arm weakness [R29.898]  Date/Time of Admission: 7/6/2025  9:17 PM  Problem List:  Patient Active Problem List   Diagnosis    Hypertension    Borderline diabetes    Depression    Alcohol withdrawal syndrome without complication (HCC)    Left sided numbness    Anxiety    Carpal tunnel syndrome    Chronic pain disorder    Cognitive communication deficit    Decreased  strength    Dysarthria    Hearing loss    Hypercholesterolemia    Other hyperlipidemia    Insomnia    Left arm weakness    Mild bipolar disorder (HCC)    Occasional tremors    Overweight with body mass index (BMI) 25.0-29.9    Pulmonary function studies abnormal    Tachycardia    Testicular hypofunction    Thoracic back pain    Unilateral facial paresis    Unsteadiness on feet    Alcohol abuse    Alcoholism (HCC)    Erectile dysfunction    Folic acid deficiency    Migraine    Diabetes mellitus, type 2 (HCC)          07/07/25 1305   Service Assessment   Patient Orientation Alert and Oriented   Cognition Alert   History Provided By Patient   Primary Caregiver Self   Accompanied By/Relationship Spouse   Support Systems Spouse/Significant Other   Patient's Healthcare

## 2025-07-07 NOTE — ED NOTES
Pt left via [facility] transport in [place] at this time en route to [facility] room 341 accompanied by [facility] personnel.

## 2025-07-07 NOTE — PLAN OF CARE
Problem: Chronic Conditions and Co-morbidities  Goal: Patient's chronic conditions and co-morbidity symptoms are monitored and maintained or improved  Outcome: Progressing     Problem: Discharge Planning  Goal: Discharge to home or other facility with appropriate resources  Outcome: Progressing  Flowsheets (Taken 7/6/2025 2228)  Discharge to home or other facility with appropriate resources: Identify barriers to discharge with patient and caregiver

## 2025-07-24 ENCOUNTER — HOSPITAL ENCOUNTER (OUTPATIENT)
Dept: PHYSICAL THERAPY | Age: 50
Setting detail: RECURRING SERIES
Discharge: HOME | End: 2025-07-27
Attending: INTERNAL MEDICINE
Payer: COMMERCIAL

## 2025-07-24 DIAGNOSIS — R26.89 OTHER ABNORMALITIES OF GAIT AND MOBILITY: Primary | ICD-10-CM

## 2025-07-24 DIAGNOSIS — M62.81 MUSCLE WEAKNESS (GENERALIZED): ICD-10-CM

## 2025-07-24 PROCEDURE — 97110 THERAPEUTIC EXERCISES: CPT

## 2025-07-24 PROCEDURE — 97162 PT EVAL MOD COMPLEX 30 MIN: CPT

## 2025-07-24 ASSESSMENT — PAIN SCALES - GENERAL: PAINLEVEL_OUTOF10: 4

## 2025-07-24 ASSESSMENT — PAIN DESCRIPTION - LOCATION: LOCATION: ARM;LEG

## 2025-07-24 ASSESSMENT — PAIN DESCRIPTION - ORIENTATION: ORIENTATION: LEFT

## 2025-07-24 NOTE — THERAPY EVALUATION
Manjit RANDALL Sandra  : 1975  [place] [employer] [place])  Secondary:  [facility] @ [address]  [place] [address]  Phone: [phone]  Fax: [phone] Plan Frequency: 1-2 times per week for 8-12 weeks    Plan of Care/Certification Expiration Date: 10/22/25        Plan of Care/Certification Expiration Date:  Plan of Care/Certification Expiration Date: 10/22/25    Frequency/Duration: Plan Frequency: 1-2 times per week for 8-12 weeks      Time In/Out:   Time In: 1030  Time Out: 1110      PT Visit Info:    Progress Note Due Date: 25  Total # of Visits to Date: 1  Progress Note Counter: 1      Visit Count:  1                OUTPATIENT PHYSICAL THERAPY:             Initial Assessment 2025               Episode (PT: CVA)         Treatment Diagnosis:     Other abnormalities of gait and mobility  Muscle weakness (generalized)  Medical/Referring Diagnosis:    Cerebrovascular accident (CVA), unspecified mechanism (HCC)   Cerebrovascular accident (CVA), unspecified mechanism (HCC) [I63.9]    Referring Physician:  Shital Carter APRN - NP MD Orders:  PT Eval and Treat   Return MD Appt:    Date of Onset:  Onset Date: 25     Allergies:  Cashews [macadamia nut oil], Fluoxetine, Gabapentin, Penicillins, and Coconut fatty acid  Restrictions/Precautions:    None      Medications Last Reviewed: 2025     SUBJECTIVE   History of Injury/Illness (Reason for Referral):  L LE numb since first stroke, when get really tired I drag it. This last stroke weakened L UE and  strength a lot. I get an aching pain/bone ache along R side.     R handed  Had one day off and back to work. Starting to get energy back. Has to walk a lot at work. On the computer and walking at work.     Wants to increase L LE strength so doesn't drag leg, increase L  strength and L UE strength. Fatigue, practice stairs for work.   No falls.   No dizziness.  Only feel

## 2025-07-24 NOTE — PROGRESS NOTES
Repositioning treatment/Epley Maneuver  for BPPV (Benign Paroxysmal Positional Vertigo)           Smart [employer] Training: See scanned report.               Treatment/Session Summary:    Treatment Assessment:   Patient tolerated exercises well and demonstrated understanding of HEP.   Communication/Consultation:  None today  Equipment provided today:  HEP  Recommendations/Intent for next treatment session: Next visit will focus on strengthening, balance and mobility training.    >Total Treatment Billable Duration:  10 minutes + evaluation  Time In: 1030  Time Out: 1110     DELANEY PARDO PT         Charge Capture  Events  [employer] Portal  Appt Desk  Attendance Report     Future Appointments   Date Time Provider Department Center   8/5/2025  4:00 PM Snow Cedeño, PT [facility]   8/7/2025  4:45 PM Snow Cedeño, PT [facility]   8/13/2025  4:45 PM Snow Cedeño, PT [facility]   8/14/2025  4:45 PM Snow Cedeño, PT [facility]   8/19/2025  4:45 PM Snow Cedeño, PT [facility]   8/21/2025 10:30 AM Willaims Lucero DO [facility]   8/21/2025  4:45 PM Snow Cedeño, PT [facility]   8/26/2025  4:45 PM Snow Cedeño, PT [facility]   8/28/2025  4:45 PM Snow Cedeño, PT [facility]

## 2025-08-05 ENCOUNTER — HOSPITAL ENCOUNTER (OUTPATIENT)
Dept: PHYSICAL THERAPY | Age: 50
Setting detail: RECURRING SERIES
Discharge: HOME | End: 2025-08-08
Attending: INTERNAL MEDICINE
Payer: COMMERCIAL

## 2025-08-05 PROCEDURE — 97112 NEUROMUSCULAR REEDUCATION: CPT

## 2025-08-05 PROCEDURE — 97110 THERAPEUTIC EXERCISES: CPT

## 2025-08-05 ASSESSMENT — PAIN SCALES - GENERAL: PAINLEVEL_OUTOF10: 4

## 2025-08-05 ASSESSMENT — PAIN DESCRIPTION - ORIENTATION: ORIENTATION: LEFT

## 2025-08-05 ASSESSMENT — PAIN DESCRIPTION - LOCATION: LOCATION: ARM;LEG

## 2025-08-07 ENCOUNTER — HOSPITAL ENCOUNTER (OUTPATIENT)
Dept: PHYSICAL THERAPY | Age: 50
Setting detail: RECURRING SERIES
End: 2025-08-07
Attending: INTERNAL MEDICINE
Payer: COMMERCIAL

## 2025-08-13 ENCOUNTER — HOSPITAL ENCOUNTER (OUTPATIENT)
Dept: PHYSICAL THERAPY | Age: 50
Setting detail: RECURRING SERIES
Discharge: HOME | End: 2025-08-16
Attending: INTERNAL MEDICINE
Payer: COMMERCIAL

## 2025-08-13 PROCEDURE — 97112 NEUROMUSCULAR REEDUCATION: CPT

## 2025-08-13 PROCEDURE — 97110 THERAPEUTIC EXERCISES: CPT

## 2025-08-13 ASSESSMENT — PAIN DESCRIPTION - ORIENTATION: ORIENTATION: LEFT

## 2025-08-13 ASSESSMENT — PAIN SCALES - GENERAL: PAINLEVEL_OUTOF10: 4

## 2025-08-13 ASSESSMENT — PAIN DESCRIPTION - LOCATION: LOCATION: ARM;LEG

## 2025-08-14 ENCOUNTER — HOSPITAL ENCOUNTER (OUTPATIENT)
Dept: PHYSICAL THERAPY | Age: 50
Setting detail: RECURRING SERIES
Discharge: HOME | End: 2025-08-17
Attending: INTERNAL MEDICINE
Payer: COMMERCIAL

## 2025-08-14 PROCEDURE — 97112 NEUROMUSCULAR REEDUCATION: CPT

## 2025-08-14 PROCEDURE — 97110 THERAPEUTIC EXERCISES: CPT

## 2025-08-14 ASSESSMENT — PAIN DESCRIPTION - LOCATION: LOCATION: ARM;LEG

## 2025-08-14 ASSESSMENT — PAIN DESCRIPTION - ORIENTATION: ORIENTATION: LEFT

## 2025-08-14 ASSESSMENT — PAIN SCALES - GENERAL: PAINLEVEL_OUTOF10: 4

## 2025-08-19 ENCOUNTER — HOSPITAL ENCOUNTER (OUTPATIENT)
Dept: PHYSICAL THERAPY | Age: 50
Setting detail: RECURRING SERIES
Discharge: HOME | End: 2025-08-22
Attending: INTERNAL MEDICINE
Payer: COMMERCIAL

## 2025-08-19 PROCEDURE — 97112 NEUROMUSCULAR REEDUCATION: CPT

## 2025-08-19 PROCEDURE — 97110 THERAPEUTIC EXERCISES: CPT

## 2025-08-19 ASSESSMENT — PAIN DESCRIPTION - ORIENTATION: ORIENTATION: LEFT

## 2025-08-19 ASSESSMENT — PAIN SCALES - GENERAL: PAINLEVEL_OUTOF10: 4

## 2025-08-19 ASSESSMENT — PAIN DESCRIPTION - LOCATION: LOCATION: ARM;LEG

## 2025-08-21 ENCOUNTER — OFFICE VISIT (OUTPATIENT)
Dept: NEUROLOGY | Age: 50
End: 2025-08-21
Payer: COMMERCIAL

## 2025-08-21 ENCOUNTER — APPOINTMENT (OUTPATIENT)
Dept: PHYSICAL THERAPY | Age: 50
End: 2025-08-21
Attending: INTERNAL MEDICINE
Payer: COMMERCIAL

## 2025-08-21 DIAGNOSIS — G43.709 CHRONIC MIGRAINE WITHOUT AURA WITHOUT STATUS MIGRAINOSUS, NOT INTRACTABLE: ICD-10-CM

## 2025-08-21 DIAGNOSIS — G45.9 TIA (TRANSIENT ISCHEMIC ATTACK): ICD-10-CM

## 2025-08-21 DIAGNOSIS — I63.9 CEREBROVASCULAR ACCIDENT (CVA), UNSPECIFIED MECHANISM (HCC): Primary | ICD-10-CM

## 2025-08-21 PROCEDURE — 99214 OFFICE O/P EST MOD 30 MIN: CPT | Performed by: PSYCHIATRY & NEUROLOGY

## 2025-08-21 ASSESSMENT — ENCOUNTER SYMPTOMS
GASTROINTESTINAL NEGATIVE: 1
EYES NEGATIVE: 1
RESPIRATORY NEGATIVE: 1

## 2025-08-26 ENCOUNTER — HOSPITAL ENCOUNTER (OUTPATIENT)
Dept: PHYSICAL THERAPY | Age: 50
Setting detail: RECURRING SERIES
Discharge: HOME | End: 2025-08-29
Attending: INTERNAL MEDICINE
Payer: COMMERCIAL

## 2025-08-26 PROCEDURE — 97112 NEUROMUSCULAR REEDUCATION: CPT

## 2025-08-26 PROCEDURE — 97110 THERAPEUTIC EXERCISES: CPT

## 2025-08-26 ASSESSMENT — PAIN DESCRIPTION - ORIENTATION: ORIENTATION: LEFT

## 2025-08-26 ASSESSMENT — PAIN DESCRIPTION - LOCATION: LOCATION: ARM;LEG

## 2025-08-26 ASSESSMENT — PAIN SCALES - GENERAL: PAINLEVEL_OUTOF10: 4

## 2025-08-28 ENCOUNTER — HOSPITAL ENCOUNTER (OUTPATIENT)
Dept: PHYSICAL THERAPY | Age: 50
Setting detail: RECURRING SERIES
Discharge: HOME | End: 2025-08-31
Attending: INTERNAL MEDICINE
Payer: COMMERCIAL

## 2025-08-28 PROCEDURE — 97112 NEUROMUSCULAR REEDUCATION: CPT

## 2025-08-28 PROCEDURE — 97110 THERAPEUTIC EXERCISES: CPT

## 2025-08-28 ASSESSMENT — PAIN SCALES - GENERAL: PAINLEVEL_OUTOF10: 2

## 2025-08-28 ASSESSMENT — PAIN DESCRIPTION - LOCATION: LOCATION: ARM;LEG

## 2025-08-28 ASSESSMENT — PAIN DESCRIPTION - ORIENTATION: ORIENTATION: LEFT

## (undated) DEVICE — SYRINGE MED 10ML LUERLOCK TIP W/O SFTY DISP

## (undated) DEVICE — CAESAR GRASPING FORCEPS: Brand: CAESAR

## (undated) DEVICE — FORCEPS BX L240CM JAW DIA2.8MM L CAP W/ NDL MIC MESH TOOTH

## (undated) DEVICE — SYRINGE, LUER SLIP, STERILE, 60ML: Brand: MEDLINE

## (undated) DEVICE — AIRLIFE™ OXYGEN TUBING 7 FEET (2.1 M) CRUSH RESISTANT OXYGEN TUBING, VINYL TIPPED: Brand: AIRLIFE™

## (undated) DEVICE — SNARE POLYP SM W13MMXL240CM SHTH DIA2.4MM OVL FLX DISP

## (undated) DEVICE — LUBE JELLY FOIL PACK 1.4 OZ: Brand: MEDLINE INDUSTRIES, INC.

## (undated) DEVICE — SINGLE PORT MANIFOLD: Brand: NEPTUNE 2

## (undated) DEVICE — GAUZE,SPONGE,4"X4",12PLY,WOVEN,NS,LF: Brand: MEDLINE

## (undated) DEVICE — CONTAINER FORMALIN PFILLED 10% NBF 40ML

## (undated) DEVICE — CANNULA NSL ORAL AD FOR CAPNOFLEX CO2 O2 AIRLFE

## (undated) DEVICE — CONNECTOR TBNG OD5-7MM O2 END DISP

## (undated) DEVICE — GLOVE ORTHO 8   MSG9480

## (undated) DEVICE — NEEDLE SYR 18GA L1.5IN RED PLAS HUB S STL BLNT FILL W/O

## (undated) DEVICE — FORCEPS GRASPING RAT TOOTH 2.4X7MMX230CM RESCUE

## (undated) DEVICE — KENDALL RADIOLUCENT FOAM MONITORING ELECTRODE RECTANGULAR SHAPE: Brand: KENDALL

## (undated) DEVICE — YANKAUER,BULB TIP,W/O VENT,RIGID,STERILE: Brand: MEDLINE

## (undated) DEVICE — BLOCK BITE AD 60FR W/ VELC STRP ADDRESSES MOST PT AND

## (undated) DEVICE — SYRINGE MED 3ML CLR PLAS STD N CTRL LUERLOCK TIP DISP